# Patient Record
Sex: FEMALE | Race: BLACK OR AFRICAN AMERICAN | Employment: UNEMPLOYED | URBAN - METROPOLITAN AREA
[De-identification: names, ages, dates, MRNs, and addresses within clinical notes are randomized per-mention and may not be internally consistent; named-entity substitution may affect disease eponyms.]

---

## 2017-01-21 ENCOUNTER — OFFICE VISIT (OUTPATIENT)
Dept: INTERNAL MEDICINE CLINIC | Age: 27
End: 2017-01-21

## 2017-01-21 VITALS
SYSTOLIC BLOOD PRESSURE: 165 MMHG | BODY MASS INDEX: 47.66 KG/M2 | RESPIRATION RATE: 18 BRPM | DIASTOLIC BLOOD PRESSURE: 78 MMHG | TEMPERATURE: 97.9 F | WEIGHT: 259 LBS | HEART RATE: 79 BPM | HEIGHT: 62 IN | OXYGEN SATURATION: 96 %

## 2017-01-21 DIAGNOSIS — Z20.2 POSSIBLE EXPOSURE TO STD: Primary | ICD-10-CM

## 2017-01-21 RX ORDER — FAMOTIDINE 20 MG/1
20 TABLET, FILM COATED ORAL
COMMUNITY
Start: 2016-11-03 | End: 2017-01-30 | Stop reason: SDUPTHER

## 2017-01-21 RX ORDER — ONDANSETRON 4 MG/1
4 TABLET, ORALLY DISINTEGRATING ORAL
COMMUNITY
Start: 2014-05-27 | End: 2018-02-08

## 2017-01-21 RX ORDER — CYCLOBENZAPRINE HCL 10 MG
10 TABLET ORAL
COMMUNITY
Start: 2016-07-17 | End: 2017-01-30 | Stop reason: SDUPTHER

## 2017-01-21 RX ORDER — BUPROPION HYDROCHLORIDE 100 MG/1
TABLET ORAL
COMMUNITY
End: 2018-02-08

## 2017-01-21 RX ORDER — CITALOPRAM 40 MG/1
TABLET, FILM COATED ORAL
COMMUNITY
End: 2018-02-08

## 2017-01-21 RX ORDER — EPINEPHRINE 0.3 MG/.3ML
0.3 INJECTION SUBCUTANEOUS
COMMUNITY
Start: 2016-11-03 | End: 2019-08-01 | Stop reason: SDUPTHER

## 2017-01-21 RX ORDER — BENZONATATE 100 MG/1
CAPSULE ORAL
COMMUNITY
Start: 2014-09-14 | End: 2018-02-08

## 2017-01-21 RX ORDER — DIPHENHYDRAMINE HCL 25 MG
25 CAPSULE ORAL
COMMUNITY
Start: 2014-03-21
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
## 2017-01-21 NOTE — PROGRESS NOTES
SUBJECTIVE:   HPI:  Gilma Calderon is a 32 y.o. female who complains of STD exposure. Patient reports unprotected sexual intercourse with someone a month ago. She just found out this week that that person may have HIV. Patient states that she currently has no symptoms to complain of, just worried about possibly having HIV. Denies any fever/chills/night sweats/weight loss/chest pain/nausea/vomiting/diarrhea. No skin lesion. ROS:    · General: negative for chills, fever, weight changes or malaise  · Respiratory: no cough, shortness of breath, or wheezing  · Cardiovascular: no chest pain, palpitations, or dyspnea on exertion  · Gastrointestinal: no GERD or history of PUD, abdominal pain, N/V, change in bowel habits, or black or bloody stools  · Musculoskeletal: no muscle weakness  · Neurological: no numbness, tingling, headache or dizziness    Past Medical History   Diagnosis Date    ASCUS (atypical squamous cells of undetermined significance) on Pap smear 3/11    Asthma     B[facility]     Chlamydia      \"    Chronic back pain     Depression     Elevated blood sugar     GC (gonococcus infection) 2007/ 2004    HPV (human papilloma virus) infection 3/11    Irregular menses     Schizophrenia ([facility]     Sleep apnea 2/14    Smoker     Suicide attempt ([facility] 5/08     with tylenol    Suicide attempt ([facility] 10/09    Trichomonal vaginitis 8/10    Uterine fibroid      History reviewed. No pertinent past surgical history. Outpatient Prescriptions Marked as Taking for the 1/21/17 encounter (Office Visit) with David Berry, DO   Medication Sig Dispense Refill    diphenhydrAMINE (BENADRYL) 25 mg capsule 25 mg.      EPINEPHrine (EPIPEN) 0.3 mg/0.3 mL injection 0.3 mg.      cyclobenzaprine (FLEXERIL) 10 mg tablet 10 mg.      metFORMIN (GLUCOPHAGE) 500 mg tablet Take 1 Tab by mouth two (2) times daily (with meals).  60 Tab 0    HYDROcodone-acetaminophen (NORCO) 5-325 mg per tablet Take 1 Tab by mouth every four (4) hours as needed for Pain.  albuterol (PROVENTIL VENTOLIN) 2.5 mg /3 mL (0.083 %) nebulizer solution 3 mL by Nebulization route three (3) times daily as needed for Wheezing. 100 Each 5    albuterol (PROVENTIL HFA, VENTOLIN HFA, PROAIR HFA) 90 mcg/actuation inhaler Take 2 Puffs by inhalation every six (6) hours as needed for Wheezing. 1 Inhaler 5    fluticasone (FLOVENT HFA) 110 mcg/actuation inhaler Take 1 Puff by inhalation every twelve (12) hours. 1 Inhaler 5     Allergies   Allergen Reactions    Other Food Anaphylaxis     [place], guacamole    Argan Kernal Oil (Arganina Spinosa) Hives    Naproxen Swelling    Percocet [Oxycodone-Acetaminophen] Itching       OBJECTIVE:  Visit Vitals    /78    Pulse 79    Temp 97.9 °F (36.6 °C) (Oral)    Resp 18    Ht 5' 2\" (1.575 m)    Wt 259 lb (117.5 kg)    LMP 01/15/2017 (Exact Date)    SpO2 96%    BMI 47.37 kg/m2      GEN: awake, alert, orientated, in no acute distress  THROAT: clear, no erythema, no exudate  NECK: No lymphadenopathy, no appearing thyroid  CV:  The heart sounds are regular in rate and rhythm. There is a normal S1 and S2. There or no murmurs, rubs, or gallops. Distal pulses are intact and equal. No peripheral edema. Lungs:  Lung sounds are clear and equal to auscultation throughout all lung fields. ASSESSMENT/PLAN:     1. Possible exposure to STD - possible exposure to HIV infection over a month ago. Currently asymptomatic without fever. Will check for HIV as well as other possible STDs. Patient to call or return next week for lab results. - HIV 1/2 AG/AB, 4TH GENERATION,W RFLX CONFIRM; Future  - CHLAMYDIA/NEISSERIA BY [place] W/REFLEX CONFIRM; Future  - RPR; Future  - HEPATITIS C AB;  Future  - HEP B SURFACE AG; Future

## 2017-01-21 NOTE — MR AVS SNAPSHOT
Visit Information Date & Time Provider Department Dept. Phone Encounter #  
 1/21/2017 11:30 AM Alonso Hobbs, [address] [place] [address] Street [id number] Follow-up Instructions Return if symptoms worsen or fail to improve. Your Appointments 1/30/2017 11:30 AM  
PHYSICAL with MD [phone]) Appt Note: and include a hiv test  
 [address]  
  
   
 [address] [facility] Upcoming Health Maintenance Date Due Pneumococcal 19-64 Medium Risk (1 of 1 - PPSV23) 4/22/2009 DTaP/Tdap/Td series (1 - Tdap) 4/22/2011 HPV AGE 9Y-26Y (2 of 3 - Female 3 Dose Series) 11/30/2015 PAP AKA CERVICAL CYTOLOGY 12/2/2016 Allergies as of 1/21/2017  Review Complete On: 1/21/2017 By: Alonso Hobbs DO Severity Noted Reaction Type Reactions Other Food High 11/22/2016    Anaphylaxis Patience Mash Argan Kernal Oil ([place] Wallace)  12/06/2016    Hives Naproxen  12/07/2012   Side Effect Swelling Percocet [Oxycodone-acetaminophen]  12/07/2012   Side Effect Itching Current Immunizations  Reviewed on 8/25/2016 Name Date  
 TB Skin Test (PPD) Intradermal 8/25/2016 Not reviewed this visit You Were Diagnosed With   
  
 Codes Comments Possible exposure to STD    -  Primary ICD-10-CM: Z20.2 ICD-9-CM: V01.6 Vitals BP Pulse Temp Resp Height(growth percentile) Weight(growth percentile) 165/78 79 97.9 °F (36.6 °C) (Oral) 18 5' 2\" (1.575 m) 259 lb (117.5 kg) LMP SpO2 BMI OB Status Smoking Status 01/15/2017 (Exact Date) 96% 47.37 kg/m2 Having regular periods Current Every Day Smoker BMI and BSA Data Body Mass Index Body Surface Area  
 47.37 kg/m 2 2.27 m 2 Preferred Pharmacy Pharmacy Name Phone [facility], [address] [facility] [phone] Your Updated Medication List  
  
   
This list is accurate as of: 1/21/17 12:35 PM.  Always use your most recent med list.  
  
  
  
  
 acetaminophen 325 mg tablet Commonly known as:  TYLENOL Take 2 Tabs by mouth every four (4) hours as needed for Pain. * albuterol 90 mcg/actuation inhaler Commonly known as:  PROVENTIL HFA, VENTOLIN HFA, PROAIR HFA Take 2 Puffs by inhalation every six (6) hours as needed for Wheezing. * albuterol 2.5 mg /3 mL (0.083 %) nebulizer solution Commonly known as:  PROVENTIL VENTOLIN  
3 mL by Nebulization route three (3) times daily as needed for Wheezing. BENADRYL 25 mg capsule Generic drug:  diphenhydrAMINE 25 mg. buPROPion 100 mg tablet Commonly known as:  [facility] Take  by Mouth.  
  
 citalopram 40 mg tablet Commonly known as:  Cheryal Coke Take  by Mouth. * cyclobenzaprine 10 mg tablet Commonly known as:  FLEXERIL  
10 mg.  
  
 * cyclobenzaprine 5 mg tablet Commonly known as:  FLEXERIL Take 1 Tab by mouth three (3) times daily as needed for Muscle Spasm(s). diclofenac EC 75 mg EC tablet Commonly known as:  VOLTAREN Take 1 Tab by mouth two (2) times a day. EPIPEN 0.3 mg/0.3 mL injection Generic drug:  EPINEPHrine  
0.3 mg.  
  
 * fluticasone 110 mcg/actuation inhaler Commonly known as:  FLOVENT HFA Take 1 Puff by inhalation every twelve (12) hours. * fluticasone 50 mcg/actuation nasal spray Commonly known as:  [place] Daggett 2 Sprays by Both Nostrils route daily. metFORMIN 500 mg tablet Commonly known as:  GLUCOPHAGE Take 1 Tab by mouth two (2) times daily (with meals). montelukast 10 mg tablet Commonly known as:  SINGULAIR Take 1 Tab by mouth daily. Indications: ALLERGIC RHINITIS, ASTHMA PREVENTION  
  
 NORCO 5-325 mg per tablet Generic drug:  HYDROcodone-acetaminophen Take 1 Tab by mouth every four (4) hours as needed for Pain. ondansetron 4 mg disintegrating tablet Commonly known as:  ZOFRAN ODT  
4 mg. PEPCID 20 mg tablet Generic drug:  famotidine 20 mg.  
  
 TESSALON PERLES 100 mg capsule Generic drug:  benzonatate Take 1 capsule 3 times daily as needed for coughing, swallow capsules whole. * Notice: This list has 6 medication(s) that are the same as other medications prescribed for you. Read the directions carefully, and ask your doctor or other care provider to review them with you. Follow-up Instructions Return if symptoms worsen or fail to improve. To-Do List   
 01/21/2017 Lab:  CHLAMYDIA/NEISSERIA BY [place] W/REFLEX CONFIRM   
  
 01/21/2017 Lab:  HEP B SURFACE AG   
  
 01/21/2017 Lab:  HEPATITIS C AB   
  
 01/21/2017 Lab:  HIV 1/2 AG/AB, 4TH GENERATION,W RFLX CONFIRM   
  
 01/21/2017 Lab:  [facility] SERVICES! Dear Delia Evans: 
Thank you for requesting a [email or web address] account. Our records indicate that you already have an active [email or web address] account. You can access your account anytime at [email or web address] Did you know that you can access your hospital and ER discharge instructions at any time in [email or web address]? You can also review all of your test results from your hospital stay or ER visit. Additional Information If you have questions, please visit the Frequently Asked Questions section of the [email or web address] website at [email or web address] Remember, [email or web address] is NOT to be used for urgent needs. For medical emergencies, dial 911. Now available from your iPhone and Android! Please provide this summary of care documentation to your next provider. Your primary care clinician is listed as [facility]. If you have any questions after today's visit, please call [phone].

## 2017-01-25 LAB
C TRACH RRNA SPEC QL NAA+PROBE: NEGATIVE
HBV SURFACE AG SERPL QL IA: NEGATIVE
HCV AB S/CO SERPL IA: <0.1 S/CO RATIO (ref 0–0.9)
HIV 1+2 AB+HIV1 P24 AG SERPL QL IA: NON REACTIVE
N GONORRHOEA RRNA SPEC QL NAA+PROBE: NEGATIVE
RPR SER QL: NON REACTIVE

## 2017-01-30 ENCOUNTER — APPOINTMENT (OUTPATIENT)
Dept: PHYSICAL THERAPY | Age: 27
End: 2017-01-30

## 2017-01-30 ENCOUNTER — OFFICE VISIT (OUTPATIENT)
Dept: INTERNAL MEDICINE CLINIC | Age: 27
End: 2017-01-30

## 2017-01-30 VITALS
WEIGHT: 258.8 LBS | OXYGEN SATURATION: 98 % | SYSTOLIC BLOOD PRESSURE: 131 MMHG | BODY MASS INDEX: 47.62 KG/M2 | HEIGHT: 62 IN | RESPIRATION RATE: 18 BRPM | HEART RATE: 82 BPM | TEMPERATURE: 97.9 F | DIASTOLIC BLOOD PRESSURE: 90 MMHG

## 2017-01-30 DIAGNOSIS — Z76.0 MEDICATION REFILL: ICD-10-CM

## 2017-01-30 DIAGNOSIS — M54.50 CHRONIC LOW BACK PAIN WITHOUT SCIATICA, UNSPECIFIED BACK PAIN LATERALITY: Primary | Chronic | ICD-10-CM

## 2017-01-30 DIAGNOSIS — Z51.81 MEDICATION MONITORING ENCOUNTER: ICD-10-CM

## 2017-01-30 DIAGNOSIS — G89.29 CHRONIC LOW BACK PAIN WITHOUT SCIATICA, UNSPECIFIED BACK PAIN LATERALITY: Primary | Chronic | ICD-10-CM

## 2017-01-30 LAB
AMPHETAMINE QL URINE POC: NEGATIVE
COCAINE QL URINE POC: NEGATIVE
LOT EXP DATE POC: NORMAL
LOT NUMBER POC: NORMAL
MARIJUANA (THC) QL URINE POC: NEGATIVE
METHAMPHETAMINE QL URINE POC: NEGATIVE
OPIATES QL URINE POC: NEGATIVE
PHENCYCLIDINE QL URINE POC: NORMAL
VALID INTERNAL CONTROL?: YES

## 2017-01-30 RX ORDER — HYDROCODONE BITARTRATE AND ACETAMINOPHEN 5; 325 MG/1; MG/1
1 TABLET ORAL
Qty: 120 TAB | Refills: 0 | Status: SHIPPED | OUTPATIENT
Start: 2017-01-30 | End: 2017-04-28 | Stop reason: SDUPTHER

## 2017-01-30 RX ORDER — FAMOTIDINE 20 MG/1
20 TABLET, FILM COATED ORAL DAILY
Qty: 90 TAB | Refills: 5 | Status: SHIPPED | OUTPATIENT
Start: 2017-01-30 | End: 2021-11-16 | Stop reason: SDUPTHER

## 2017-01-30 NOTE — PROGRESS NOTES
HISTORY OF PRESENT ILLNESS  Daniel Kirk is a 32 y.o. female. Visit Vitals    /90    Pulse 82    Temp 97.9 °F (36.6 °C) (Oral)    Resp 18    Ht 5' 2\" (1.575 m)    Wt 258 lb 12.8 oz (117.4 kg)    LMP 01/15/2017 (Exact Date)    SpO2 98%    BMI 47.34 kg/m2       HPI Comments: Also discussed the STD testing that she had--results given to her    Medication Refill   The history is provided by the patient (pt here for refill on her pain medicaion for her chronic and longstanding back pain). This is a new problem. Back Pain    The history is provided by the patient. This is a chronic problem. The current episode started more than 1 week ago. The problem has not changed since onset. The problem occurs daily. The pain is associated with a remote injury. The pain is present in the lumbar spine. The quality of the pain is described as aching and similar to previous episodes. The pain does not radiate. The symptoms are aggravated by bending and certain positions. She has tried NSAIDs, ice, analgesics and muscle relaxants (opioids) for the symptoms. Risk factors include obesity, lack of exercise and a sedentary lifestyle. Review of Systems   Constitutional: Negative. Musculoskeletal: Positive for back pain. Physical Exam   Constitutional: She is oriented to person, place, and time. She appears well-developed and well-nourished. No distress. Cardiovascular: Normal rate. Pulmonary/Chest: Effort normal.   Musculoskeletal: She exhibits no edema. Fair back flexion. NML strength observed   Neurological: She is alert and oriented to person, place, and time. Skin: Skin is warm and dry. She is not diaphoretic. Nursing note and vitals reviewed. ASSESSMENT and PLAN    ICD-10-CM ICD-9-CM    1. Chronic low back pain without sciatica, unspecified back pain laterality M54.5 724.2     G89.29 338.29    2.  Medication refill Z76.0 V68.1 HYDROcodone-acetaminophen (NORCO) 5-325 mg per tablet famotidine (PEPCID) 20 mg tablet      HYDROcodone-acetaminophen (NORCO) 5-325 mg per tablet      HYDROcodone-acetaminophen (NORCO) 5-325 mg per tablet   3.  Medication monitoring encounter Z51.81 V58.83 AMB POC DRUG SCREEN (LIST A ANY # NON TLC DEVICES)       Pt needs to return in 3 months for WWE at the time of next refill visit      POC UDS negative (does not test for hydrocodone)

## 2017-01-30 NOTE — MR AVS SNAPSHOT
Visit Information Date & Time Provider Department Dept. Phone Encounter #  
 1/30/2017 11:30 AM Marlon Prader, [address] [facility] [facility] [address] [facility] [address] [id number] Follow-up Instructions Return in about 3 months (around 4/30/2017) for Well Woman Exam, chronic pain, Med refills. Upcoming Health Maintenance Date Due  
 HPV AGE 9Y-34Y (2 of 3 - Female 3 Dose Series) 11/30/2015 PAP AKA CERVICAL CYTOLOGY 12/2/2016 DTaP/Tdap/Td series (2 - Td) 1/30/2027 Allergies as of 1/30/2017  Review Complete On: 1/30/2017 By: Marlon Prader, MD  
  
 Severity Noted Reaction Type Reactions Other Food High 11/22/2016    Anaphylaxis Rober Gentle Argan Kernal Oil (Chavo Crafts)  12/06/2016    Hives Naproxen  12/07/2012   Side Effect Swelling Percocet [Oxycodone-acetaminophen]  12/07/2012   Side Effect Itching Current Immunizations  Reviewed on 8/25/2016 Name Date  
 TB Skin Test (PPD) Intradermal 8/25/2016 Not reviewed this visit You Were Diagnosed With   
  
 Codes Comments Chronic low back pain without sciatica, unspecified back pain laterality    -  Primary ICD-10-CM: M54.5, G89.29 ICD-9-CM: 724.2, 338.29 Medication refill     ICD-10-CM: Z76.0 ICD-9-CM: V68.1 Medication monitoring encounter     ICD-10-CM: Z51.81 
ICD-9-CM: V58.83 Vitals BP Pulse Temp Resp Height(growth percentile) Weight(growth percentile) 131/90 82 97.9 °F (36.6 °C) (Oral) 18 5' 2\" (1.575 m) 258 lb 12.8 oz (117.4 kg) LMP SpO2 BMI OB Status Smoking Status 01/15/2017 (Exact Date) 98% 47.34 kg/m2 Having regular periods Current Every Day Smoker Vitals History BMI and BSA Data Body Mass Index Body Surface Area  
 47.34 kg/m 2 2.27 m 2 Preferred Pharmacy Pharmacy Name Phone [facility] [place], [address] [phone] Your Updated Medication List  
  
   
 This list is accurate as of: 1/30/17 12:41 PM.  Always use your most recent med list.  
  
  
  
  
 acetaminophen 325 mg tablet Commonly known as:  TYLENOL Take 2 Tabs by mouth every four (4) hours as needed for Pain. * albuterol 90 mcg/actuation inhaler Commonly known as:  PROVENTIL HFA, VENTOLIN HFA, PROAIR HFA Take 2 Puffs by inhalation every six (6) hours as needed for Wheezing. * albuterol 2.5 mg /3 mL (0.083 %) nebulizer solution Commonly known as:  PROVENTIL VENTOLIN  
3 mL by Nebulization route three (3) times daily as needed for Wheezing. BENADRYL 25 mg capsule Generic drug:  diphenhydrAMINE 25 mg. buPROPion 100 mg tablet Commonly known as:  [facility] Take  by Mouth.  
  
 citalopram 40 mg tablet Commonly known as:  Yohana Mutters Take  by Mouth. cyclobenzaprine 5 mg tablet Commonly known as:  FLEXERIL Take 1 Tab by mouth three (3) times daily as needed for Muscle Spasm(s). diclofenac EC 75 mg EC tablet Commonly known as:  VOLTAREN Take 1 Tab by mouth two (2) times a day. EPIPEN 0.3 mg/0.3 mL injection Generic drug:  EPINEPHrine  
0.3 mg.  
  
 famotidine 20 mg tablet Commonly known as:  PEPCID Take 1 Tab by mouth daily. * fluticasone 110 mcg/actuation inhaler Commonly known as:  FLOVENT HFA Take 1 Puff by inhalation every twelve (12) hours. * fluticasone 50 mcg/actuation nasal spray Commonly known as:  Osei Riser 2 Sprays by Both Nostrils route daily. * HYDROcodone-acetaminophen 5-325 mg per tablet Commonly known as:  [employer] President Take 1 Tab by mouth every six (6) hours as needed for Pain. Max Daily Amount: 4 Tabs. * HYDROcodone-acetaminophen 5-325 mg per tablet Commonly known as:  Bev President Take 1 Tab by mouth every six (6) hours as needed for Pain. * HYDROcodone-acetaminophen 5-325 mg per tablet Commonly known as:  Bev President Take 1 Tab by mouth every six (6) hours as needed for Pain. metFORMIN 500 mg tablet Commonly known as:  GLUCOPHAGE Take 1 Tab by mouth two (2) times daily (with meals). montelukast 10 mg tablet Commonly known as:  SINGULAIR Take 1 Tab by mouth daily. Indications: ALLERGIC RHINITIS, ASTHMA PREVENTION  
  
 ondansetron 4 mg disintegrating tablet Commonly known as:  ZOFRAN ODT  
4 mg. TESSALON PERLES 100 mg capsule Generic drug:  benzonatate Take 1 capsule 3 times daily as needed for coughing, swallow capsules whole. * Notice: This list has 7 medication(s) that are the same as other medications prescribed for you. Read the directions carefully, and ask your doctor or other care provider to review them with you. Prescriptions Printed Refills HYDROcodone-acetaminophen (NORCO) 5-325 mg per tablet 0 Sig: Take 1 Tab by mouth every six (6) hours as needed for Pain. Max Daily Amount: 4 Tabs. Class: Print Route: Oral  
 HYDROcodone-acetaminophen (NORCO) 5-325 mg per tablet 0 Sig: Take 1 Tab by mouth every six (6) hours as needed for Pain. Class: Print Route: Oral  
 HYDROcodone-acetaminophen (NORCO) 5-325 mg per tablet 0 Sig: Take 1 Tab by mouth every six (6) hours as needed for Pain. Class: Print Route: Oral  
  
Prescriptions Sent to Pharmacy Refills  
 famotidine (PEPCID) 20 mg tablet 5 Sig: Take 1 Tab by mouth daily. Class: Normal  
 Pharmacy: [address], [address] Ph #: [phone] Route: Oral  
  
We Performed the Following AMB POC DRUG SCREEN (LIST A ANY # NON TLC DEVICES) [id number] CPT(R)] Follow-up Instructions Return in about 3 months (around 4/30/2017) for Well Woman Exam, chronic pain, Med refills. To-Do List   
 02/14/2017 9:00 AM  
  Appointment with Mansi Guzmán RD at [address] [facility] SERVICES! Dear Judith Cordero: 
Thank you for requesting a [employer] account.   Our records indicate that you already have an active [employer] account. You can access your account anytime at [email or web address] Did you know that you can access your hospital and ER discharge instructions at any time in [employer]? You can also review all of your test results from your hospital stay or ER visit. Additional Information If you have questions, please visit the Frequently Asked Questions section of the [employer] website at [email or web address]/. Remember, [employer] is NOT to be used for urgent needs. For medical emergencies, dial 911. Now available from your iPhone and Android! Please provide this summary of care documentation to your next provider. Your primary care clinician is listed as [facility]. If you have any questions after today's visit, please call [phone].

## 2017-01-30 NOTE — PROGRESS NOTES
ROOM # 4    Pt presents today for med refills and to discuss lab results    Pt preferred language for health care discussion is english. Is someone accompanying this pt? no    Is the patient using any DME equipment during OV? no    Depression Screening completed. Active Dx    Learning Assessment completed. Yes    Abuse Screening completed. Yes    Health Maintenance reviewed and discussed per provider. Yes    Pt is due for Pap. Please order/place referral if appropriate. Advance Directive:  1. Do you have an advance directive in place? Patient Reply: No    2. If not, would you like material regarding how to put one in place? Patient Reply: No    Coordination of Care:  1. Have you been to the ER, urgent care clinic since your last visit? Hospitalized since your last visit? No    2. Have you seen or consulted any other health care providers outside of the [address] [place] [address] since your last visit? Include any pap smears or colon screening.  No

## 2017-04-28 ENCOUNTER — OFFICE VISIT (OUTPATIENT)
Dept: INTERNAL MEDICINE CLINIC | Age: 27
End: 2017-04-28

## 2017-04-28 VITALS
SYSTOLIC BLOOD PRESSURE: 123 MMHG | HEART RATE: 83 BPM | DIASTOLIC BLOOD PRESSURE: 76 MMHG | HEIGHT: 62 IN | RESPIRATION RATE: 18 BRPM | BODY MASS INDEX: 47.29 KG/M2 | WEIGHT: 257 LBS | TEMPERATURE: 97.7 F | OXYGEN SATURATION: 97 %

## 2017-04-28 DIAGNOSIS — M54.50 CHRONIC LOW BACK PAIN WITHOUT SCIATICA, UNSPECIFIED BACK PAIN LATERALITY: Primary | Chronic | ICD-10-CM

## 2017-04-28 DIAGNOSIS — G89.29 CHRONIC LOW BACK PAIN WITHOUT SCIATICA, UNSPECIFIED BACK PAIN LATERALITY: Primary | Chronic | ICD-10-CM

## 2017-04-28 DIAGNOSIS — Z76.0 MEDICATION REFILL: ICD-10-CM

## 2017-04-28 DIAGNOSIS — G47.33 OBSTRUCTIVE SLEEP APNEA SYNDROME: ICD-10-CM

## 2017-04-28 RX ORDER — HYDROCODONE BITARTRATE AND ACETAMINOPHEN 5; 325 MG/1; MG/1
1 TABLET ORAL
Qty: 120 TAB | Refills: 0 | Status: SHIPPED | OUTPATIENT
Start: 2017-04-28 | End: 2017-06-29 | Stop reason: SDUPTHER

## 2017-04-28 RX ORDER — FLUTICASONE PROPIONATE 50 MCG
2 SPRAY, SUSPENSION (ML) NASAL DAILY
Qty: 1 BOTTLE | Refills: 5 | Status: SHIPPED | OUTPATIENT
Start: 2017-04-28 | End: 2018-07-27

## 2017-04-28 RX ORDER — METFORMIN HYDROCHLORIDE 500 MG/1
500 TABLET ORAL 2 TIMES DAILY WITH MEALS
Qty: 60 TAB | Refills: 5 | Status: SHIPPED | OUTPATIENT
Start: 2017-04-28 | End: 2018-05-09 | Stop reason: SDUPTHER

## 2017-04-28 NOTE — MR AVS SNAPSHOT
Visit Information Date & Time Provider Department Dept. Phone Encounter #  
 4/28/2017  9:00 AM Winnie Bermudez, [address] [id number] Follow-up Instructions Return in about 3 months (around 7/28/2017) for Well Woman Exam, chronic pain, Med refills. Upcoming Health Maintenance Date Due  
 PAP AKA CERVICAL CYTOLOGY 12/2/2016 DTaP/Tdap/Td series (2 - Td) 1/30/2027 Allergies as of 4/28/2017  Review Complete On: 4/28/2017 By: Winnie Bermudez MD  
  
 Severity Noted Reaction Type Reactions Other Food High 11/22/2016    Anaphylaxis Uche Pearl Argan Kernal Oil (Artelia Bulls)  12/06/2016    Hives Naproxen  12/07/2012   Side Effect Swelling Percocet [Oxycodone-acetaminophen]  12/07/2012   Side Effect Itching Current Immunizations  Reviewed on 8/25/2016 Name Date  
 TB Skin Test (PPD) Intradermal 8/25/2016 Not reviewed this visit You Were Diagnosed With   
  
 Codes Comments Chronic low back pain without sciatica, unspecified back pain laterality    -  Primary ICD-10-CM: M54.5, G89.29 ICD-9-CM: 724.2, 338.29 Obstructive sleep apnea syndrome     ICD-10-CM: G47.33 
ICD-9-CM: 327.23 Medication refill     ICD-10-CM: Z76.0 ICD-9-CM: V68.1 Vitals BP Pulse Temp Resp Height(growth percentile) Weight(growth percentile) 123/76 83 97.7 °F (36.5 °C) (Oral) 18 5' 2\" (1.575 m) 257 lb (116.6 kg) LMP SpO2 BMI OB Status Smoking Status 03/01/2017 (Approximate) 97% 47.01 kg/m2 Having regular periods Current Every Day Smoker BMI and BSA Data Body Mass Index Body Surface Area 47.01 kg/m 2 2.26 m 2 Preferred Pharmacy Pharmacy Name Phone [id number] - [place], [address] [facility]. [phone] Your Updated Medication List  
  
   
This list is accurate as of: 4/28/17  9:58 AM.  Always use your most recent med list.  
  
  
 acetaminophen 325 mg tablet Commonly known as:  TYLENOL Take 2 Tabs by mouth every four (4) hours as needed for Pain. * albuterol 90 mcg/actuation inhaler Commonly known as:  PROVENTIL HFA, VENTOLIN HFA, PROAIR HFA Take 2 Puffs by inhalation every six (6) hours as needed for Wheezing. * albuterol 2.5 mg /3 mL (0.083 %) nebulizer solution Commonly known as:  PROVENTIL VENTOLIN  
3 mL by Nebulization route three (3) times daily as needed for Wheezing. BENADRYL 25 mg capsule Generic drug:  diphenhydrAMINE 25 mg. buPROPion 100 mg tablet Commonly known as:  [facility] Take  by Mouth.  
  
 citalopram 40 mg tablet Commonly known as:  Randa Brunette Take  by Mouth. cyclobenzaprine 5 mg tablet Commonly known as:  FLEXERIL Take 1 Tab by mouth three (3) times daily as needed for Muscle Spasm(s). diclofenac EC 75 mg EC tablet Commonly known as:  VOLTAREN Take 1 Tab by mouth two (2) times a day. EPIPEN 0.3 mg/0.3 mL injection Generic drug:  EPINEPHrine  
0.3 mg.  
  
 famotidine 20 mg tablet Commonly known as:  PEPCID Take 1 Tab by mouth daily. * fluticasone 110 mcg/actuation inhaler Commonly known as:  FLOVENT HFA Take 1 Puff by inhalation every twelve (12) hours. * fluticasone 50 mcg/actuation nasal spray Commonly known as:  Pietro South 2 Sprays by Both Nostrils route daily. * HYDROcodone-acetaminophen 5-325 mg per tablet Commonly known as:  Maryfrances Grumbles Take 1 Tab by mouth every six (6) hours as needed for Pain. Max Daily Amount: 4 Tabs. * HYDROcodone-acetaminophen 5-325 mg per tablet Commonly known as:  Maryfrances Grumbles Take 1 Tab by mouth every six (6) hours as needed for Pain. * HYDROcodone-acetaminophen 5-325 mg per tablet Commonly known as:  Maryfrances Grumbles Take 1 Tab by mouth every six (6) hours as needed for Pain.  
  
 metFORMIN 500 mg tablet Commonly known as:  GLUCOPHAGE  
 Take 1 Tab by mouth two (2) times daily (with meals). montelukast 10 mg tablet Commonly known as:  SINGULAIR Take 1 Tab by mouth daily. Indications: ALLERGIC RHINITIS, ASTHMA PREVENTION  
  
 ondansetron 4 mg disintegrating tablet Commonly known as:  ZOFRAN ODT  
4 mg. TESSALON PERLES 100 mg capsule Generic drug:  benzonatate Take 1 capsule 3 times daily as needed for coughing, swallow capsules whole. * Notice: This list has 7 medication(s) that are the same as other medications prescribed for you. Read the directions carefully, and ask your doctor or other care provider to review them with you. Prescriptions Printed Refills HYDROcodone-acetaminophen (NORCO) 5-325 mg per tablet 0 Sig: Take 1 Tab by mouth every six (6) hours as needed for Pain. Max Daily Amount: 4 Tabs. Class: Print Route: Oral  
 HYDROcodone-acetaminophen (NORCO) 5-325 mg per tablet 0 Sig: Take 1 Tab by mouth every six (6) hours as needed for Pain. Class: Print Route: Oral  
 HYDROcodone-acetaminophen (NORCO) 5-325 mg per tablet 0 Sig: Take 1 Tab by mouth every six (6) hours as needed for Pain. Class: Print Route: Oral  
  
Prescriptions Sent to Pharmacy Refills  
 metFORMIN (GLUCOPHAGE) 500 mg tablet 5 Sig: Take 1 Tab by mouth two (2) times daily (with meals). Class: Normal  
 Pharmacy: [facility], [address] [facility]. Ph #: [phone] Route: Oral  
 fluticasone (FLONASE) 50 mcg/actuation nasal spray 5 Si Sprays by Both Nostrils route daily. Class: Normal  
 Pharmacy: [facility], [address] [facility]. Ph #: [phone] Route: Both Nostrils We Performed the Following AMB SUPPLY ORDER [[id number] Custom] Comments:  
 C-PAP supplies Follow-up Instructions Return in about 3 months (around 2017) for Well Woman Exam, chronic pain, Med refills. Introducing [facility] & HE[facility]! Dear Noy Graham: 
Thank you for requesting a [employer] account. Our records indicate that you already have an active [employer] account. You can access your account anytime at [email or web address] Did you know that you can access your hospital and ER discharge instructions at any time in [employer]? You can also review all of your test results from your hospital stay or ER visit. Additional Information If you have questions, please visit the Frequently Asked Questions section of the [employer] website at [email or web address] Remember, [employer] is NOT to be used for urgent needs. For medical emergencies, dial 911. Now available from your iPhone and Android! Please provide this summary of care documentation to your next provider. Your primary care clinician is listed as [facility]. If you have any questions after today's visit, please call [phone].

## 2017-04-28 NOTE — PROGRESS NOTES
HISTORY OF PRESENT ILLNESS  Simeon Downing is a 32 y.o. female. Visit Vitals    /76    Pulse 83    Temp 97.7 °F (36.5 °C) (Oral)    Resp 18    Ht 5' 2\" (1.575 m)    Wt 257 lb (116.6 kg)    LMP 03/01/2017 (Approximate)    SpO2 97%    BMI 47.01 kg/m2       HPI Comments: Had normal POC drug screen Jan 30, 2017    Pt has chronic low back problems. Eventually was place on norco and has been maintained on this w/o consequence. Medication Refill   The history is provided by the patient. This is a new problem. Pertinent negatives include no chest pain, no headaches and no shortness of breath. Review of Systems   Constitutional: Negative. Respiratory: Negative for cough and shortness of breath. Cardiovascular: Negative for chest pain and palpitations. Musculoskeletal: Positive for back pain and joint pain. Neurological: Negative. Negative for headaches. Physical Exam   Constitutional: She is oriented to person, place, and time. She appears well-developed and well-nourished. No distress. Cardiovascular: Normal rate. Pulmonary/Chest: Effort normal.   Genitourinary: Vaginal discharge: neg SLRs. Musculoskeletal: She exhibits no edema. nml leg strength     Neurological: She is alert and oriented to person, place, and time. Skin: Skin is warm and dry. She is not diaphoretic. Psychiatric: She has a normal mood and affect. Nursing note and vitals reviewed. ASSESSMENT and PLAN    ICD-10-CM ICD-9-CM    1. Chronic low back pain without sciatica, unspecified back pain laterality M54.5 724.2     G89.29 338.29    2. Obstructive sleep apnea syndrome G47.33 327.23 AMB SUPPLY ORDER   3.  Medication refill Z76.0 V68.1 metFORMIN (GLUCOPHAGE) 500 mg tablet      HYDROcodone-acetaminophen (NORCO) 5-325 mg per tablet      HYDROcodone-acetaminophen (NORCO) 5-325 mg per tablet      HYDROcodone-acetaminophen (NORCO) 5-325 mg per tablet      fluticasone (FLONASE) 50 mcg/actuation nasal spray       Refill pain meds today  Reviewed new state rules and regulations with her re opioids  Pt has long standing chronic back pain. Has been stable on current meds.  looks very good. UDS January was fine    Discussed weight, lifestyle, diet and exercise. Advised weight also affects her back.     Return 3 mos for WWE and med refills

## 2017-04-28 NOTE — PROGRESS NOTES
ROOM # 4    Boris Mcleod presents today for   Chief Complaint   Patient presents with    Medication Refill       Boris Mcleod preferred language for health care discussion is english/other. Is someone accompanying this pt? no    Is the patient using any DME equipment during OV? no    Depression Screening:  PHQ 2 / 9, over the last two weeks 8/25/2016 6/13/2016 10/5/2015 10/16/2014   Little interest or pleasure in doing things Not at all Not at all Not at all Not at all   Feeling down, depressed or hopeless Not at all Not at all Not at all Not at all   Total Score PHQ 2 0 0 0 0       Learning Assessment:  Learning Assessment 4/27/2015   PRIMARY LEARNER Patient   HIGHEST LEVEL OF EDUCATION - PRIMARY LEARNER  DID NOT GRADUATE [facility] PRIMARY LEARNER NONE   CO-LEARNER CAREGIVER No   PRIMARY LANGUAGE ENGLISH   LEARNER PREFERENCE PRIMARY READING   ANSWERED BY patient   RELATIONSHIP SELF       Abuse Screening:  Abuse Screening Questionnaire 4/27/2015   Do you ever feel afraid of your partner? N   Are you in a relationship with someone who physically or mentally threatens you? N   Is it safe for you to go home? Y       Fall Risk  No flowsheet data found. Health Maintenance reviewed and discussed per provider. Yes    Brois Mcleod is due for pap. Please order/place referral if appropriate. Advance Directive:  1. Do you have an advance directive in place? Patient Reply: no    2. If not, would you like material regarding how to put one in place? Patient Reply: no    Coordination of Care:  1. Have you been to the ER, urgent care clinic since your last visit? Hospitalized since your last visit? no    2. Have you seen or consulted any other health care providers outside of the [address] [place] [address] since your last visit? Include any pap smears or colon screening.  no

## 2017-06-29 ENCOUNTER — OFFICE VISIT (OUTPATIENT)
Dept: INTERNAL MEDICINE CLINIC | Age: 27
End: 2017-06-29

## 2017-06-29 VITALS
WEIGHT: 251 LBS | BODY MASS INDEX: 46.19 KG/M2 | SYSTOLIC BLOOD PRESSURE: 132 MMHG | TEMPERATURE: 98 F | OXYGEN SATURATION: 99 % | HEIGHT: 62 IN | DIASTOLIC BLOOD PRESSURE: 83 MMHG | HEART RATE: 71 BPM | RESPIRATION RATE: 18 BRPM

## 2017-06-29 DIAGNOSIS — Z79.891 LONG TERM CURRENT USE OF OPIATE ANALGESIC: ICD-10-CM

## 2017-06-29 DIAGNOSIS — E11.9 CONTROLLED TYPE 2 DIABETES MELLITUS WITHOUT COMPLICATION, WITHOUT LONG-TERM CURRENT USE OF INSULIN (HCC): Chronic | ICD-10-CM

## 2017-06-29 DIAGNOSIS — G89.29 CHRONIC LOW BACK PAIN WITHOUT SCIATICA, UNSPECIFIED BACK PAIN LATERALITY: Primary | Chronic | ICD-10-CM

## 2017-06-29 DIAGNOSIS — E66.8 MODERATE OBESITY: ICD-10-CM

## 2017-06-29 DIAGNOSIS — Z76.0 MEDICATION REFILL: ICD-10-CM

## 2017-06-29 DIAGNOSIS — M54.50 CHRONIC LOW BACK PAIN WITHOUT SCIATICA, UNSPECIFIED BACK PAIN LATERALITY: Primary | Chronic | ICD-10-CM

## 2017-06-29 LAB
ALCOHOL UR POC: NORMAL
AMPHETAMINES UR POC: NEGATIVE
BARBITURATES UR POC: NEGATIVE
BENZODIAZEPINES UR POC: NEGATIVE
BUPRENORPHINE UR POC: NORMAL
CANNABINOIDS UR POC: NEGATIVE
CARISOPRODOL UR POC: NORMAL
COCAINE UR POC: NEGATIVE
FENTANYL UR POC: NORMAL
MDMA/ECSTASY UR POC: NEGATIVE
METHADONE UR POC: NEGATIVE
METHAMPHETAMINE UR POC: NEGATIVE
METHYLPHENIDATE UR POC: NEGATIVE
OPIATES UR POC: NEGATIVE
OXYCODONE UR POC: NEGATIVE
PHENCYCLIDINE UR POC: NORMAL
PROPOXYPHENE UR POC: NORMAL
TRAMADOL UR POC: NORMAL
TRICYCLICS UR POC: NORMAL

## 2017-06-29 RX ORDER — HYDROCODONE BITARTRATE AND ACETAMINOPHEN 5; 325 MG/1; MG/1
1 TABLET ORAL
Qty: 120 TAB | Refills: 0 | Status: SHIPPED | OUTPATIENT
Start: 2017-06-29 | End: 2017-10-30 | Stop reason: SDUPTHER

## 2017-06-29 RX ORDER — HYDROCODONE BITARTRATE AND ACETAMINOPHEN 5; 325 MG/1; MG/1
1 TABLET ORAL
Qty: 120 TAB | Refills: 0 | Status: SHIPPED | OUTPATIENT
Start: 2017-06-29 | End: 2017-10-27 | Stop reason: SDUPTHER

## 2017-06-29 NOTE — PROGRESS NOTES
HISTORY OF PRESENT ILLNESS  Mendoza Orellana is a 32 y.o. female. Visit Vitals    /83    Pulse 71    Temp 98 °F (36.7 °C) (Oral)    Resp 18    Ht 5' 2\" (1.575 m)    Wt 251 lb (113.9 kg)    LMP 06/27/2017    SpO2 99%    BMI 45.91 kg/m2       HPI Comments: Pt has chronic back pain maintained on percocet  Last UDS OK. Due today   reviewed and appropriate activity noted. No adverse activity noted. Just started a new job at [employer]. Works 12-10  Five days a week. Works at [employer]. 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 The back of the machine    Medication Refill   The history is provided by the patient (see comments). This is a new problem. Review of Systems   Constitutional: Negative. Respiratory: Negative. Cardiovascular: Negative. Musculoskeletal: Positive for back pain. No change  But reports more foot \"burnoing\" sensations. Sometimes worse with physical activity         Physical Exam   Constitutional: She is oriented to person, place, and time. She appears well-developed and well-nourished. No distress. Cardiovascular: Normal rate. Pulmonary/Chest: Effort normal.   Musculoskeletal: She exhibits no edema. Neurological: She is alert and oriented to person, place, and time. Skin: Skin is warm and dry. She is not diaphoretic. Psychiatric: She has a normal mood and affect. Nursing note and vitals reviewed. ASSESSMENT and PLAN    ICD-10-CM ICD-9-CM    1. Chronic low back pain without sciatica, unspecified back pain laterality M54.5 724.2 AMB POC DRUG SCREEN ()    G89.29 338.29    2. Controlled type 2 diabetes mellitus without complication, without long-term current use of insulin ([facility]) I07.9 593.20 METABOLIC PANEL, BASIC      HEMOGLOBIN A1C W/O EAG   3. Long term current use of opiate analgesic Z79.891 V58.69 AMB POC DRUG SCREEN ()   4. Moderate obesity E66.8 278.00    5.  Medication refill Z76.0 V68.1 HYDROcodone-acetaminophen (NORCO) 5-325 mg per tablet      HYDROcodone-acetaminophen (NORCO) 5-325 mg per tablet      HYDROcodone-acetaminophen (NORCO) 5-325 mg per tablet       Needs f/u glu testing  Foot sxs may be related to DM    UDS neg   reviewed and appropriate activity noted. No adverse activity noted.     Discussed weight, lifestyle, diet and exercise    F/u 3 months for med refills and WWE

## 2017-06-29 NOTE — PROGRESS NOTES
Chief Complaint   Patient presents with    Medication Refill       Pt preferred language for health care discussion is English. Is someone accompanying this pt? no    Is the patient using any DME equipment during OV? no    Depression Screening:  PHQ over the last two weeks 6/29/2017 1/30/2017 8/25/2016 6/13/2016 10/5/2015 10/16/2014   PHQ Not Done Active Diagnosis of Depression or Bipolar Disorder Active Diagnosis of Depression or Bipolar Disorder - - - -   Little interest or pleasure in doing things Not at all - Not at all Not at all Not at all Not at all   Feeling down, depressed or hopeless More than half the days - Not at all Not at all Not at all Not at all   Total Score PHQ 2 2 - 0 0 0 0       Learning Assessment:  Learning Assessment 4/27/2015   PRIMARY LEARNER Patient   HIGHEST LEVEL OF EDUCATION - PRIMARY LEARNER  DID NOT GRADUATE HIGH SCHOOL   BARRIERS PRIMARY LEARNER NONE   CO-LEARNER CAREGIVER No   PRIMARY LANGUAGE ENGLISH   LEARNER PREFERENCE PRIMARY READING   ANSWERED BY patient   RELATIONSHIP SELF       Abuse Screening:  Abuse Screening Questionnaire 4/27/2015   Do you ever feel afraid of your partner? N   Are you in a relationship with someone who physically or mentally threatens you? N   Is it safe for you to go home? Y         Health Maintenance reviewed and discussed per provider. Yes    Pt is due for Pap. Please order/place referral if appropriate. Advance Directive:  1. Do you have an advance directive in place? Patient Reply:no    2. If not, would you like material regarding how to put one in place? Patient Reply: no    Coordination of Care:  1. Have you been to the ER, urgent care clinic since your last visit? Hospitalized since your last visit? no    2. Have you seen or consulted any other health care providers outside of the [address] [place] [address] since your last visit? Include any pap smears or colon screening.  no

## 2017-06-29 NOTE — MR AVS SNAPSHOT
Visit Information Date & Time Provider Department Dept. Phone Encounter #  
 6/29/2017  8:45 AM Jerilyn Jacome, [address] [facility] [phone] [id number] Follow-up Instructions Return in about 3 months (around 9/29/2017) for Well Woman Exam, chronic pain, Med refills, diabetes mellitus. Upcoming Health Maintenance Date Due  
 PAP AKA CERVICAL CYTOLOGY 12/2/2016 INFLUENZA AGE 9 TO ADULT 8/1/2017 DTaP/Tdap/Td series (2 - Td) 1/30/2027 Allergies as of 6/29/2017  Review Complete On: 6/29/2017 By: Sarah Akins, LPN Severity Noted Reaction Type Reactions Other Food High 11/22/2016    Anaphylaxis Arnetha Pali Argan Kernal Oil (Monet Gudelia)  12/06/2016    Hives Naproxen  12/07/2012   Side Effect Swelling Percocet [Oxycodone-acetaminophen]  12/07/2012   Side Effect Itching Current Immunizations  Reviewed on 8/25/2016 Name Date  
 TB Skin Test (PPD) Intradermal 8/25/2016 Not reviewed this visit You Were Diagnosed With   
  
 Codes Comments Chronic low back pain without sciatica, unspecified back pain laterality    -  Primary ICD-10-CM: M54.5, G89.29 ICD-9-CM: 724.2, 338.29 Controlled type 2 diabetes mellitus without complication, without long-term current use of insulin ([facility]     ICD-10-CM: E11.9 ICD-9-CM: 250.00 Long term current use of opiate analgesic     ICD-10-CM: Y70.077 ICD-9-CM: V58.69 Moderate obesity     ICD-10-CM: J19.5 ICD-9-CM: 278.00 Medication refill     ICD-10-CM: Z76.0 ICD-9-CM: V68.1 Vitals BP Pulse Temp Resp Height(growth percentile) Weight(growth percentile) 132/83 71 98 °F (36.7 °C) (Oral) 18 5' 2\" (1.575 m) 251 lb (113.9 kg) LMP SpO2 BMI OB Status Smoking Status 06/27/2017 99% 45.91 kg/m2 Having regular periods Current Every Day Smoker BMI and BSA Data Body Mass Index Body Surface Area  45.91 kg/m 2 2.23 m 2  
  
  
 Preferred Pharmacy Pharmacy Name Phone [id number] - [place], [address] [facility] Prakash Ackerman. [phone] Your Updated Medication List  
  
   
This list is accurate as of: 6/29/17  9:23 AM.  Always use your most recent med list.  
  
  
  
  
 acetaminophen 325 mg tablet Commonly known as:  TYLENOL Take 2 Tabs by mouth every four (4) hours as needed for Pain. * albuterol 90 mcg/actuation inhaler Commonly known as:  PROVENTIL HFA, VENTOLIN HFA, PROAIR HFA Take 2 Puffs by inhalation every six (6) hours as needed for Wheezing. * albuterol 2.5 mg /3 mL (0.083 %) nebulizer solution Commonly known as:  PROVENTIL VENTOLIN  
3 mL by Nebulization route three (3) times daily as needed for Wheezing. BENADRYL 25 mg capsule Generic drug:  diphenhydrAMINE 25 mg. buPROPion 100 mg tablet Commonly known as:  [facility] Take  by Mouth.  
  
 citalopram 40 mg tablet Commonly known as:  Fritz Aura Take  by Mouth. cyclobenzaprine 5 mg tablet Commonly known as:  FLEXERIL Take 1 Tab by mouth three (3) times daily as needed for Muscle Spasm(s). diclofenac EC 75 mg EC tablet Commonly known as:  VOLTAREN Take 1 Tab by mouth two (2) times a day. EPIPEN 0.3 mg/0.3 mL injection Generic drug:  EPINEPHrine  
0.3 mg.  
  
 famotidine 20 mg tablet Commonly known as:  PEPCID Take 1 Tab by mouth daily. * fluticasone 110 mcg/actuation inhaler Commonly known as:  FLOVENT HFA Take 1 Puff by inhalation every twelve (12) hours. * fluticasone 50 mcg/actuation nasal spray Commonly known as:  Tereza Bough 2 Sprays by Both Nostrils route daily. * HYDROcodone-acetaminophen 5-325 mg per tablet Commonly known as:  Davis Fryer Take 1 Tab by mouth every six (6) hours as needed for Pain. Max Daily Amount: 4 Tabs. * HYDROcodone-acetaminophen 5-325 mg per tablet Commonly known as:  Davis Fryer  
 Take 1 Tab by mouth every six (6) hours as needed for Pain. * HYDROcodone-acetaminophen 5-325 mg per tablet Commonly known as:  Elysia Rook Take 1 Tab by mouth every six (6) hours as needed for Pain.  
  
 metFORMIN 500 mg tablet Commonly known as:  GLUCOPHAGE Take 1 Tab by mouth two (2) times daily (with meals). montelukast 10 mg tablet Commonly known as:  SINGULAIR Take 1 Tab by mouth daily. Indications: ALLERGIC RHINITIS, ASTHMA PREVENTION  
  
 ondansetron 4 mg disintegrating tablet Commonly known as:  ZOFRAN ODT  
4 mg. TESSALON PERLES 100 mg capsule Generic drug:  benzonatate Take 1 capsule 3 times daily as needed for coughing, swallow capsules whole. * Notice: This list has 7 medication(s) that are the same as other medications prescribed for you. Read the directions carefully, and ask your doctor or other care provider to review them with you. Prescriptions Printed Refills HYDROcodone-acetaminophen (NORCO) 5-325 mg per tablet 0 Sig: Take 1 Tab by mouth every six (6) hours as needed for Pain. Max Daily Amount: 4 Tabs. Class: Print Route: Oral  
 HYDROcodone-acetaminophen (NORCO) 5-325 mg per tablet 0 Sig: Take 1 Tab by mouth every six (6) hours as needed for Pain. Class: Print Route: Oral  
 HYDROcodone-acetaminophen (NORCO) 5-325 mg per tablet 0 Sig: Take 1 Tab by mouth every six (6) hours as needed for Pain. Class: Print Route: Oral  
  
We Performed the Following AMB POC DRUG SCREEN () [ HCP] Follow-up Instructions Return in about 3 months (around 9/29/2017) for Well Woman Exam, chronic pain, Med refills, diabetes mellitus. To-Do List   
 06/29/2017 Lab:  HEMOGLOBIN A1C W/O EAG   
  
 06/29/2017 Lab:  METABOLIC PANEL, BASIC Introducing [facility] & HEALTH SERVICES! Dear Becca Jamison: 
Thank you for requesting a [employer] account.   Our records indicate that you already have an active [employer] account. You can access your account anytime at [email or web address] Did you know that you can access your hospital and ER discharge instructions at any time in [employer]? You can also review all of your test results from your hospital stay or ER visit. Additional Information If you have questions, please visit the Frequently Asked Questions section of the [employer] website at [email or web address] Remember, [employer] is NOT to be used for urgent needs. For medical emergencies, dial 911. Now available from your iPhone and Android! Please provide this summary of care documentation to your next provider. Your primary care clinician is listed as [facility]. If you have any questions after today's visit, please call [phone].

## 2017-10-27 DIAGNOSIS — Z76.0 MEDICATION REFILL: ICD-10-CM

## 2017-10-27 NOTE — TELEPHONE ENCOUNTER
Requested Prescriptions     Pending Prescriptions Disp Refills    HYDROcodone-acetaminophen (NORCO) 5-325 mg per tablet 120 Tab 0     Sig: Take 1 Tab by mouth every six (6) hours as needed for Pain. Max Daily Amount: 4 Tabs.

## 2017-10-27 NOTE — TELEPHONE ENCOUNTER
printed and placed in PCP medication refill review folder.      Last UDS date: 6/29/20172  Pain contract signed: 1/2016  Last office visit: 6/29/2017  Next Appt: n/a

## 2017-10-30 RX ORDER — HYDROCODONE BITARTRATE AND ACETAMINOPHEN 5; 325 MG/1; MG/1
1 TABLET ORAL
Qty: 120 TAB | Refills: 0 | Status: SHIPPED | OUTPATIENT
Start: 2017-10-30 | End: 2017-11-27 | Stop reason: SDUPTHER

## 2017-10-30 NOTE — TELEPHONE ENCOUNTER
Printed rx for:    Requested Prescriptions     Signed Prescriptions Disp Refills    HYDROcodone-acetaminophen (NORCO) 5-325 mg per tablet 120 Tab 0     Sig: Take 1 Tab by mouth every six (6) hours as needed for Pain. Max Daily Amount: 4 Tabs. Authorizing Provider: Kat Marcus  and no aberrancies seen. 2 weeks prior to 10/2017 refill, she needs to come in for UDS. This is a send out lab test.     Please let pt know that this is ready for .

## 2017-11-21 ENCOUNTER — OFFICE VISIT (OUTPATIENT)
Dept: INTERNAL MEDICINE CLINIC | Age: 27
End: 2017-11-21

## 2017-11-21 ENCOUNTER — HOSPITAL ENCOUNTER (OUTPATIENT)
Dept: LAB | Age: 27
Discharge: HOME | End: 2017-11-21
Payer: MEDICARE

## 2017-11-21 VITALS
BODY MASS INDEX: 46.38 KG/M2 | OXYGEN SATURATION: 97 % | WEIGHT: 252 LBS | DIASTOLIC BLOOD PRESSURE: 77 MMHG | HEIGHT: 62 IN | RESPIRATION RATE: 20 BRPM | SYSTOLIC BLOOD PRESSURE: 129 MMHG | HEART RATE: 87 BPM | TEMPERATURE: 98.4 F

## 2017-11-21 DIAGNOSIS — H66.90 ACUTE OTITIS MEDIA, UNSPECIFIED OTITIS MEDIA TYPE: Primary | ICD-10-CM

## 2017-11-21 DIAGNOSIS — Z76.0 MEDICATION REFILL: ICD-10-CM

## 2017-11-21 PROCEDURE — G0480 DRUG TEST DEF 1-7 CLASSES: HCPCS | Performed by: FAMILY MEDICINE

## 2017-11-21 RX ORDER — CETIRIZINE HYDROCHLORIDE, PSEUDOEPHEDRINE HYDROCHLORIDE 5; 120 MG/1; MG/1
1 TABLET, FILM COATED, EXTENDED RELEASE ORAL 2 TIMES DAILY
Qty: 24 TAB | Refills: 3 | Status: SHIPPED | OUTPATIENT
Start: 2017-11-21 | End: 2018-02-08

## 2017-11-21 RX ORDER — ALBUTEROL SULFATE 0.83 MG/ML
2.5 SOLUTION RESPIRATORY (INHALATION)
Qty: 100 EACH | Refills: 5 | Status: SHIPPED | OUTPATIENT
Start: 2017-11-21 | End: 2019-08-01 | Stop reason: SDUPTHER

## 2017-11-21 RX ORDER — AMOXICILLIN 500 MG/1
500 CAPSULE ORAL 2 TIMES DAILY
Qty: 10 CAP | Refills: 0 | Status: SHIPPED | OUTPATIENT
Start: 2017-11-21 | End: 2017-11-26

## 2017-11-21 NOTE — PROGRESS NOTES
Jazzy Neves presents today for   Chief Complaint   Patient presents with    Ear Pain     bilateral x 1 week       Muna Orta preferred language for health care discussion is english/other. Pt stated she has not tried otc medication for relief. Is someone accompanying this pt? no    Is the patient using any DME equipment during OV? no    Depression Screening:  PHQ over the last two weeks 11/21/2017 6/29/2017 1/30/2017 8/25/2016 6/13/2016 10/5/2015 10/16/2014   PHQ Not Done - Active Diagnosis of Depression or Bipolar Disorder Active Diagnosis of Depression or Bipolar Disorder - - - -   Little interest or pleasure in doing things Not at all Not at all - Not at all Not at all Not at all Not at all   Feeling down, depressed or hopeless Not at all More than half the days - Not at all Not at all Not at all Not at all   Total Score PHQ 2 0 2 - 0 0 0 0       Learning Assessment:  Learning Assessment 4/27/2015   PRIMARY LEARNER Patient   HIGHEST LEVEL OF EDUCATION - PRIMARY LEARNER  DID NOT GRADUATE HIGH SCHOOL   BARRIERS PRIMARY LEARNER NONE   CO-LEARNER CAREGIVER No   PRIMARY LANGUAGE ENGLISH   LEARNER PREFERENCE PRIMARY READING   ANSWERED BY patient   RELATIONSHIP SELF       Abuse Screening:  Abuse Screening Questionnaire 4/27/2015   Do you ever feel afraid of your partner? N   Are you in a relationship with someone who physically or mentally threatens you? N   Is it safe for you to go home? Y       Fall Risk  N/I    Health Maintenance reviewed and discussed per provider. Yes    Pt will f/u with PCP for HM. Advance Directive:  1. Do you have an advance directive in place? Patient Reply: No    2. If not, would you like material regarding how to put one in place? Patient Reply: No    Coordination of Care:  1. Have you been to the ER, urgent care clinic since your last visit? Hospitalized since your last visit? no    2.  Have you seen or consulted any other health care providers outside of the [facility] [address] since your last visit?  I No

## 2017-11-21 NOTE — MR AVS SNAPSHOT
Visit Information Date & Time Provider Department Dept. Phone Encounter #  
 11/21/2017 10:15 AM [address] [phone] [id number] Upcoming Health Maintenance Date Due  
 FOOT EXAM Q1 4/22/2000 MICROALBUMIN Q1 4/22/2000 EYE EXAM RETINAL OR DILATED Q1 4/22/2000 LIPID PANEL Q1 12/2/2014 PAP AKA CERVICAL CYTOLOGY 12/2/2016 HEMOGLOBIN A1C Q6M 6/6/2017 Influenza Age 5 to Adult 8/1/2017 DTaP/Tdap/Td series (2 - Td) 1/30/2027 Allergies as of 11/21/2017  Review Complete On: 11/21/2017 By: Francesca Perez LPN Severity Noted Reaction Type Reactions Other Food High 11/22/2016    Anaphylaxis Marlys Imam Argan Kernal Oil ([place])  12/06/2016    Hives Naproxen  12/07/2012   Side Effect Swelling Percocet [Oxycodone-acetaminophen]  12/07/2012   Side Effect Itching Current Immunizations  Reviewed on 8/25/2016 Name Date  
 TB Skin Test (PPD) Intradermal 8/25/2016 Not reviewed this visit You Were Diagnosed With   
  
 Codes Comments Acute otitis media, unspecified otitis media type    -  Primary ICD-10-CM: H66.90 ICD-9-CM: 382.9 Medication refill     ICD-10-CM: Z76.0 ICD-9-CM: V68.1 Vitals BP Pulse Temp Resp Height(growth percentile) Weight(growth percentile) 129/77 (BP 1 Location: Left arm, BP Patient Position: Sitting) 87 98.4 °F (36.9 °C) (Oral) 20 5' 2\" (1.575 m) 252 lb (114.3 kg) SpO2 BMI OB Status Smoking Status 97% 46.09 kg/m2 Unknown Current Every Day Smoker BMI and BSA Data Body Mass Index Body Surface Area 46.09 kg/m 2 2.24 m 2 Preferred Pharmacy Pharmacy Name Phone [id number] - [place], [address]. [phone] Your Updated Medication List  
  
   
This list is accurate as of: 11/21/17 10:42 AM.  Always use your most recent med list.  
  
  
  
  
 * albuterol 90 mcg/actuation inhaler Commonly known as:  PROVENTIL HFA, VENTOLIN HFA, PROAIR HFA Take 2 Puffs by inhalation every six (6) hours as needed for Wheezing. * albuterol 2.5 mg /3 mL (0.083 %) nebulizer solution Commonly known as:  PROVENTIL VENTOLIN  
3 mL by Nebulization route three (3) times daily as needed for Wheezing. amoxicillin 500 mg capsule Commonly known as:  AMOXIL Take 1 Cap by mouth two (2) times a day for 5 days. BENADRYL 25 mg capsule Generic drug:  diphenhydrAMINE 25 mg. buPROPion 100 mg tablet Commonly known as:  [facility] Take  by Mouth.  
  
 cetirizine-psuedoePHEDrine 5-120 mg per tablet Commonly known as:  ZyrTEC-D Take 1 Tab by mouth two (2) times a day. citalopram 40 mg tablet Commonly known as:  Charisse Frieze Take  by Mouth. cyclobenzaprine 5 mg tablet Commonly known as:  FLEXERIL Take 1 Tab by mouth three (3) times daily as needed for Muscle Spasm(s). diclofenac EC 75 mg EC tablet Commonly known as:  VOLTAREN Take 1 Tab by mouth two (2) times a day. EPIPEN 0.3 mg/0.3 mL injection Generic drug:  EPINEPHrine  
0.3 mg.  
  
 famotidine 20 mg tablet Commonly known as:  PEPCID Take 1 Tab by mouth daily. * fluticasone 110 mcg/actuation inhaler Commonly known as:  FLOVENT HFA Take 1 Puff by inhalation every twelve (12) hours. * fluticasone 50 mcg/actuation nasal spray Commonly known as:  [place] 2 Sprays by Both Nostrils route daily. HYDROcodone-acetaminophen 5-325 mg per tablet Commonly known as:  Yanci Barrs Take 1 Tab by mouth every six (6) hours as needed for Pain. Max Daily Amount: 4 Tabs. metFORMIN 500 mg tablet Commonly known as:  GLUCOPHAGE Take 1 Tab by mouth two (2) times daily (with meals). montelukast 10 mg tablet Commonly known as:  SINGULAIR Take 1 Tab by mouth daily.  Indications: ALLERGIC RHINITIS, ASTHMA PREVENTION  
  
 ondansetron 4 mg disintegrating tablet Commonly known as:  ZOFRAN ODT  
4 mg. TESSALON PERLES 100 mg capsule Generic drug:  benzonatate Take 1 capsule 3 times daily as needed for coughing, swallow capsules whole. * Notice: This list has 4 medication(s) that are the same as other medications prescribed for you. Read the directions carefully, and ask your doctor or other care provider to review them with you. Prescriptions Sent to Pharmacy Refills  
 albuterol (PROVENTIL VENTOLIN) 2.5 mg /3 mL (0.083 %) nebulizer solution 5 Sig: 3 mL by Nebulization route three (3) times daily as needed for Wheezing. Class: Normal  
 Pharmacy: [facility], [address]. Ph #: [phone] Route: Nebulization  
 amoxicillin (AMOXIL) 500 mg capsule 0 Sig: Take 1 Cap by mouth two (2) times a day for 5 days. Class: Normal  
 Pharmacy: [facility], [address]. Ph #: [phone] Route: Oral  
 cetirizine-psuedoePHEDrine (ZYRTEC-D) 5-120 mg per tablet 3 Sig: Take 1 Tab by mouth two (2) times a day. Class: Normal  
 Pharmacy: [facility], [address]. Ph #: [phone] Route: Oral  
  
To-Do List   
 11/21/2017 Lab:  Renetta Salter 13 ()   
  
  
[facility]! Dear Kiara Levels: 
Thank you for requesting a [employer] account. Our records indicate that you already have an active [employer] account. You can access your account anytime at [email or web address] Did you know that you can access your hospital and ER discharge instructions at any time in [employer]? You can also review all of your test results from your hospital stay or ER visit. Additional Information If you have questions, please visit the Frequently Asked Questions section of the [employer] website at [email or web address] Remember, MyChart is NOT to be used for urgent needs. For medical emergencies, dial 911. Now available from your iPhone and Android! Please provide this summary of care documentation to your next provider. Your primary care clinician is listed as [facility]. If you have any questions after today's visit, please call [phone].

## 2017-11-21 NOTE — PROGRESS NOTES
FAMILY MEDICINE CLINIC NOTE    S: Right ear throbing for the last week, mild non-productive cough, no sinus congestion, no sore throat, no ear discharge. History of otitis media last year. No fever. No sick contacts. The patient is also requesting a refill of her norco, informed the patient that her refill is not due until the end of the month, will collect urine  toxicology today and instruct patient to call her PCP early next week for a medication refill. Current Outpatient Prescriptions:     albuterol (PROVENTIL VENTOLIN) 2.5 mg /3 mL (0.083 %) nebulizer solution, 3 mL by Nebulization route three (3) times daily as needed for Wheezing., Disp: 100 Each, Rfl: 5    amoxicillin (AMOXIL) 500 mg capsule, Take 1 Cap by mouth two (2) times a day for 5 days. , Disp: 10 Cap, Rfl: 0    cetirizine-psuedoePHEDrine (ZYRTEC-D) 5-120 mg per tablet, Take 1 Tab by mouth two (2) times a day., Disp: 24 Tab, Rfl: 3    HYDROcodone-acetaminophen (NORCO) 5-325 mg per tablet, Take 1 Tab by mouth every six (6) hours as needed for Pain. Max Daily Amount: 4 Tabs., Disp: 120 Tab, Rfl: 0    metFORMIN (GLUCOPHAGE) 500 mg tablet, Take 1 Tab by mouth two (2) times daily (with meals). , Disp: 60 Tab, Rfl: 5    fluticasone (FLONASE) 50 mcg/actuation nasal spray, 2 Sprays by Both Nostrils route daily. , Disp: 1 Bottle, Rfl: 5    famotidine (PEPCID) 20 mg tablet, Take 1 Tab by mouth daily. , Disp: 90 Tab, Rfl: 5    benzonatate (TESSALON PERLES) 100 mg capsule, Take 1 capsule 3 times daily as needed for coughing, swallow capsules whole., Disp: , Rfl:     buPROPion (WELLBUTRIN) 100 mg tablet, Take  by Mouth., Disp: , Rfl:     citalopram (CELEXA) 40 mg tablet, Take  by Mouth., Disp: , Rfl:     diphenhydrAMINE (BENADRYL) 25 mg capsule, 25 mg., Disp: , Rfl:     EPINEPHrine (EPIPEN) 0.3 mg/0.3 mL injection, 0.3 mg., Disp: , Rfl:     ondansetron (ZOFRAN ODT) 4 mg disintegrating tablet, 4 mg., Disp: , Rfl:     cyclobenzaprine (FLEXERIL) 5 mg tablet, Take 1 Tab by mouth three (3) times daily as needed for Muscle Spasm(s). , Disp: 20 Tab, Rfl: 0    diclofenac EC (VOLTAREN) 75 mg EC tablet, Take 1 Tab by mouth two (2) times a day., Disp: 60 Tab, Rfl: 1    montelukast (SINGULAIR) 10 mg tablet, Take 1 Tab by mouth daily. Indications: ALLERGIC RHINITIS, ASTHMA PREVENTION, Disp: 30 Tab, Rfl: 5    albuterol (PROVENTIL HFA, VENTOLIN HFA, PROAIR HFA) 90 mcg/actuation inhaler, Take 2 Puffs by inhalation every six (6) hours as needed for Wheezing., Disp: 1 Inhaler, Rfl: 5    fluticasone (FLOVENT HFA) 110 mcg/actuation inhaler, Take 1 Puff by inhalation every twelve (12) hours. , Disp: 1 Inhaler, Rfl: 5    Past Medical History:   Diagnosis Date    ASCUS (atypical squamous cells of undetermined significance) on Pap smear 3/11    Asthma     B[facility] disorder ([facility] [address]     Chlamydia      \"    Chronic back pain     Depression     Diabetes mellitus ([facility] [address] 01/02/2017    Elevated blood sugar     GC (gonococcus infection) 2007/ 2004    HPV (human papilloma virus) infection 3/11    Irregular menses     Schizophrenia ([facility] [address]     Sleep apnea 2/14    Smoker     Suicide attempt 5/08    with tylenol    Suicide attempt 10/09    Trichomonal vaginitis 8/10    Uterine fibroid        History reviewed. No pertinent surgical history.     Social History     Social History    Marital status: SINGLE     Spouse name: N/A    Number of children: N/A    Years of education: N/A     Occupational History    disabled      Social History Main Topics    Smoking status: Current Every Day Smoker     Packs/day: 0.25     Years: 9.00    Smokeless tobacco: Never Used      Comment: unable/unwilling to quit at this time    Alcohol use No    Drug use: No    Sexual activity: Yes     Partners: Male     Birth control/ protection: None     Other Topics Concern    Not on file     Social History Narrative       Family History   Problem Relation Age of Onset    Attention Deficit Hyperactivity Disorder Brother     Bipolar Disorder Brother     Seizures Sister     Hypertension Sister     Other Sister      substance abuse         O:  Visit Vitals    /77 (BP 1 Location: Left arm, BP Patient Position: Sitting)    Pulse 87    Temp 98.4 °F (36.9 °C) (Oral)    Resp 20    Ht 5' 2\" (1.575 m)    Wt 252 lb (114.3 kg)    SpO2 97%    BMI 46.09 kg/m2     NAD, comfortable  No sinus TTP   Distorted right TM, air fluid levels visible  No LAD  Clear posterior oropharynx  RRR, no murmurs  CTABL, no wheezing/ronchi/rales    32 y.o. female      ICD-10-CM ICD-9-CM    1. Acute otitis media, unspecified otitis media type H66.90 382.9 amoxicillin (AMOXIL) 500 mg capsule      cetirizine-psuedoePHEDrine (ZYRTEC-D) 5-120 mg per tablet   2.  Medication refill Z76.0 V68.1 TOXASSURE SELECT 13 (MW)      albuterol (PROVENTIL VENTOLIN) 2.5 mg /3 mL (0.083 %) nebulizer solution

## 2017-11-27 ENCOUNTER — TELEPHONE (OUTPATIENT)
Dept: INTERNAL MEDICINE CLINIC | Age: 27
End: 2017-11-27

## 2017-11-27 DIAGNOSIS — Z76.0 MEDICATION REFILL: ICD-10-CM

## 2017-11-27 PROBLEM — E66.01 OBESITY, MORBID (HCC): Status: ACTIVE | Noted: 2017-11-27

## 2017-11-27 RX ORDER — HYDROCODONE BITARTRATE AND ACETAMINOPHEN 5; 325 MG/1; MG/1
1 TABLET ORAL
Qty: 120 TAB | Refills: 0 | Status: SHIPPED | OUTPATIENT
Start: 2017-11-27 | End: 2018-01-12 | Stop reason: SDUPTHER

## 2017-11-27 NOTE — TELEPHONE ENCOUNTER
printed and placed in PCP medication refill review folder.      Last UDS date: 2017 ( In process)  Pain contract signed: 2016( )  Next Appt: not scheduled  Last appt: 2017 with Dr Jasmin Mills  Last appt with PCP: 2017

## 2017-11-27 NOTE — TELEPHONE ENCOUNTER
reviewed and appropriate activity noted. No adverse activity noted. UDS pending.   Needs new contract signed  Will refill this month but she needs to see me for any additional refills

## 2017-11-27 NOTE — TELEPHONE ENCOUNTER
Patient states the antibiotic that was prescribed last week has not helped the ear infection at all, can something different please be called in.

## 2017-12-04 LAB — TOXASSURE SELECT 13: NORMAL

## 2018-01-10 ENCOUNTER — TELEPHONE (OUTPATIENT)
Dept: INTERNAL MEDICINE CLINIC | Age: 28
End: 2018-01-10

## 2018-01-10 DIAGNOSIS — J45.20 MILD INTERMITTENT ASTHMA WITHOUT COMPLICATION: Primary | ICD-10-CM

## 2018-01-10 NOTE — TELEPHONE ENCOUNTER
I can order the nebulizer and supplies but I was not involved with her CPAP and have never ordered a CPAP machine . I have no knowledge of settings, etc. She needs to talk to whomever ordered it originally  Shereen Brown is the order to go?
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
## 2018-01-10 NOTE — TELEPHONE ENCOUNTER
Contacted pt at [facility] number. Two patient Identifiers confirmed. Advised pt per Dr Julien Clarity notes. Pt stated she will contact her previous provider who gave her CPAP. Pt stated she would like her DME order sent to [facility]. Dr Alek Higgins.

## 2018-01-12 DIAGNOSIS — Z76.0 MEDICATION REFILL: ICD-10-CM

## 2018-01-12 NOTE — TELEPHONE ENCOUNTER
printed and placed in PCP medication refill review folder.      Last UDS date: 2018  Pain contract signed: 2016 ()  Next Appt: not scheduled  Last appt; 2017

## 2018-01-15 ENCOUNTER — TELEPHONE (OUTPATIENT)
Dept: INTERNAL MEDICINE CLINIC | Age: 28
End: 2018-01-15

## 2018-01-15 RX ORDER — HYDROCODONE BITARTRATE AND ACETAMINOPHEN 5; 325 MG/1; MG/1
1 TABLET ORAL
Qty: 120 TAB | Refills: 0 | Status: SHIPPED | OUTPATIENT
Start: 2018-01-15 | End: 2018-02-26 | Stop reason: SDUPTHER

## 2018-01-15 NOTE — TELEPHONE ENCOUNTER
[facility] called to let you know they received the order but are unable to process due to patient's Ins. States they ave forwarded the order to [employer] for processing.

## 2018-02-08 ENCOUNTER — HOSPITAL ENCOUNTER (EMERGENCY)
Age: 28
Discharge: HOME | End: 2018-02-09
Attending: EMERGENCY MEDICINE | Admitting: EMERGENCY MEDICINE
Payer: MEDICARE

## 2018-02-08 DIAGNOSIS — E66.01 OBESITY, MORBID (HCC): ICD-10-CM

## 2018-02-08 DIAGNOSIS — E11.9 CONTROLLED TYPE 2 DIABETES MELLITUS WITHOUT COMPLICATION, WITHOUT LONG-TERM CURRENT USE OF INSULIN (HCC): Chronic | ICD-10-CM

## 2018-02-08 DIAGNOSIS — M54.42 CHRONIC MIDLINE LOW BACK PAIN WITH BILATERAL SCIATICA: Primary | ICD-10-CM

## 2018-02-08 DIAGNOSIS — M54.41 CHRONIC MIDLINE LOW BACK PAIN WITH BILATERAL SCIATICA: Primary | ICD-10-CM

## 2018-02-08 DIAGNOSIS — G89.29 CHRONIC MIDLINE LOW BACK PAIN WITH BILATERAL SCIATICA: Primary | ICD-10-CM

## 2018-02-08 LAB — HCG UR QL: NEGATIVE

## 2018-02-08 PROCEDURE — 81025 URINE PREGNANCY TEST: CPT | Performed by: EMERGENCY MEDICINE

## 2018-02-08 PROCEDURE — 99283 EMERGENCY DEPT VISIT LOW MDM: CPT

## 2018-02-08 PROCEDURE — 82962 GLUCOSE BLOOD TEST: CPT

## 2018-02-08 PROCEDURE — 81001 URINALYSIS AUTO W/SCOPE: CPT | Performed by: EMERGENCY MEDICINE

## 2018-02-08 NOTE — LETTER
NOTIFICATION RETURN TO WORK / SCHOOL 
 
2/9/2018 12:54 AM 
 
Ms. Michel Lara [address] [facility] [phone] To Whom It May Concern: 
 
Michel Lara is currently under the care of [facility] EMERGENCY DEPT. She will return to work/school on: 2/10/18 If there are questions or concerns please have the patient contact our office. Sincerely, Holly Gomez RN

## 2018-02-09 VITALS
BODY MASS INDEX: 47.2 KG/M2 | HEART RATE: 89 BPM | SYSTOLIC BLOOD PRESSURE: 135 MMHG | OXYGEN SATURATION: 99 % | HEIGHT: 61 IN | DIASTOLIC BLOOD PRESSURE: 86 MMHG | RESPIRATION RATE: 16 BRPM | WEIGHT: 250 LBS

## 2018-02-09 LAB
APPEARANCE UR: CLEAR
BILIRUB UR QL: NEGATIVE
COLOR UR: YELLOW
EPITH CASTS URNS QL MICRO: NORMAL /LPF (ref 0–5)
GLUCOSE BLD STRIP.AUTO-MCNC: 142 MG/DL (ref 70–110)
GLUCOSE UR STRIP.AUTO-MCNC: NEGATIVE MG/DL
HGB UR QL STRIP: NEGATIVE
KETONES UR QL STRIP.AUTO: ABNORMAL MG/DL
LEUKOCYTE ESTERASE UR QL STRIP.AUTO: NEGATIVE
NITRITE UR QL STRIP.AUTO: NEGATIVE
PH UR STRIP: 6 [PH] (ref 5–8)
PROT UR STRIP-MCNC: ABNORMAL MG/DL
RBC #/AREA URNS HPF: NORMAL /HPF (ref 0–5)
SP GR UR REFRACTOMETRY: 1.03 (ref 1–1.03)
UROBILINOGEN UR QL STRIP.AUTO: 0.2 EU/DL (ref 0.2–1)
WBC URNS QL MICRO: NORMAL /HPF (ref 0–4)

## 2018-02-09 RX ORDER — DICLOFENAC SODIUM 10 MG/G
GEL TOPICAL 4 TIMES DAILY
Qty: 100 G | Refills: 0 | Status: SHIPPED | OUTPATIENT
Start: 2018-02-09 | End: 2018-03-03

## 2018-02-09 NOTE — ED TRIAGE NOTES
C/o migraines, generalized body aches, feet swelling, urinary frequency, dehydrated. \"I think my sugar is high. \"

## 2018-02-09 NOTE — DISCHARGE INSTRUCTIONS

## 2018-02-09 NOTE — ED PROVIDER NOTES
EMERGENCY DEPARTMENT HISTORY AND PHYSICAL EXAM    12:43 AM      Date: 2/8/2018  Patient Name: Adrian Mathew    History of Presenting Illness     Chief Complaint   Patient presents with    Foot Swelling    Headache    Generalized Body Aches         History Provided By: Patient    Chief Complaint: BLE Pain   Duration:  2 days   Timing:  Acute  Location: BLE   Quality: N/A  Severity: Moderate  Modifying Factors: In the past took Narco to alleviate pain   Associated Symptoms: BLE swelling, mild lower back pain and urinary frequency. Additional History (Context): Adrian Mathew is a 32 y.o. female with diabetes, asthma and chronic back pain  who presents to the ED with c/o acute moderate BLE pain x 2 days. Pt denies injury. States that she thought her BG was high, notes in triage her BG was 142. Associated Sx includes BLE swelling, mild lower back pain, and urinary frequency. Pt notes she has had back pain for years, takes Narco and was doing physical therapy to treat Sx. Notes compliance with DM medications and monitoring what she eats. No fever noted, no other medical problems reported. No further complaint and Sx. PCP: Angel Valencia MD    Current Outpatient Prescriptions   Medication Sig Dispense Refill    diclofenac (VOLTAREN) 1 % gel Apply  to affected area four (4) times daily. 100 g 0    HYDROcodone-acetaminophen (NORCO) 5-325 mg per tablet Take 1 Tab by mouth every six (6) hours as needed for Pain. Max Daily Amount: 4 Tabs. 120 Tab 0    albuterol (PROVENTIL VENTOLIN) 2.5 mg /3 mL (0.083 %) nebulizer solution 3 mL by Nebulization route three (3) times daily as needed for Wheezing. 100 Each 5    metFORMIN (GLUCOPHAGE) 500 mg tablet Take 1 Tab by mouth two (2) times daily (with meals). 60 Tab 5    fluticasone (FLONASE) 50 mcg/actuation nasal spray 2 Sprays by Both Nostrils route daily.  1 Bottle 5    diphenhydrAMINE (BENADRYL) 25 mg capsule 25 mg.      EPINEPHrine (EPIPEN) 0.3 mg/0.3 mL injection 0.3 mg.      albuterol (PROVENTIL HFA, VENTOLIN HFA, PROAIR HFA) 90 mcg/actuation inhaler Take 2 Puffs by inhalation every six (6) hours as needed for Wheezing. 1 Inhaler 5    fluticasone (FLOVENT HFA) 110 mcg/actuation inhaler Take 1 Puff by inhalation every twelve (12) hours. 1 Inhaler 5    famotidine (PEPCID) 20 mg tablet Take 1 Tab by mouth daily. 90 Tab 5    montelukast (SINGULAIR) 10 mg tablet Take 1 Tab by mouth daily. Indications: ALLERGIC RHINITIS, ASTHMA PREVENTION 30 Tab 5       Past History     Past Medical History:  Past Medical History:   Diagnosis Date    ASCUS (atypical squamous cells of undetermined significance) on Pap smear 3/11    Asthma     [facility]     Chlamydia      \"    Chronic back pain     Depression     Diabetes mellitus ([facility] 01/02/2017    Elevated blood sugar     GC (gonococcus infection) 2007/ 2004    HPV (human papilloma virus) infection 3/11    Irregular menses     Schizophrenia ([facility] [facility]     Sleep apnea 2/14    Smoker     Suicide attempt 5/08    with tylenol    Suicide attempt 10/09    Trichomonal vaginitis 8/10    Uterine fibroid        Past Surgical History:  History reviewed. No pertinent surgical history. Family History:  Family History   Problem Relation Age of Onset    Attention Deficit Hyperactivity Disorder Brother     Bipolar Disorder Brother     Seizures Sister     Hypertension Sister     Other Sister      substance abuse       Social History:  Social History   Substance Use Topics    Smoking status: Current Every Day Smoker     Packs/day: 0.25     Years: 9.00    Smokeless tobacco: Never Used      Comment: unable/unwilling to quit at this time    Alcohol use No       Allergies:   Allergies   Allergen Reactions    Other Food Anaphylaxis     [place], guacamole    Argan Kernal Oil (Arganina Spinosa) Hives    Naproxen Swelling    Percocet [Oxycodone-Acetaminophen] Itching         Review of Systems     Review of Systems Constitutional: Negative for fever. Cardiovascular: Positive for leg swelling (BLE). Genitourinary: Positive for frequency. Musculoskeletal: Positive for back pain (mild ). Positive for BLE pain    All other systems reviewed and are negative. Physical Exam     Visit Vitals    /86 (BP 1 Location: Right arm, BP Patient Position: Sitting)    Pulse 89    Resp 16    Ht 5' 1\" (1.549 m)    Wt 113.4 kg (250 lb)    SpO2 99%    BMI 47.24 kg/m2       Physical Exam   Constitutional:   General:  Well-developed, well-nourished, morbid obesity. Head:  Normocephalic atraumatic. Eyes:  Pupils midrange extraocular movements intact. No pallor or conjunctival injection. Nose:  No rhinorrhea, inspection grossly normal.    Ears:  Grossly normal to inspection, no discharge. Mouth:  Mucous membranes moist, no appreciable intraoral lesion. Neck/Back:  Trachea midline, no asymmetry. No pain on palpation down cervical, thoracic, or lumbar spine step-off or deformity. Chest:  Grossly normal inspection, symmetric chest rise. Pulmonary:  Clear to auscultation bilaterally no wheezes rhonchi or rales. Cardiovascular:  S1-S2 no murmurs rubs or gallops. Abdomen: Soft, nontender, nondistended no guarding rebound or peritoneal signs. Extremities:  Grossly normal to inspection, peripheral pulses intact in all 4 extremities easily palpable dorsalis pedis pulses. Toe flexion and extension and ankle flexion and extension normal bilaterally    Neurologic:  Alert and oriented no appreciable focal neurologic deficit. Skin:  Warm and dry  Psychiatric:  Grossly normal mood and affect. Nursing note reviewed, vital signs reviewed.            Diagnostic Study Results     Labs -  Recent Results (from the past 12 hour(s))   URINALYSIS W/ RFLX MICROSCOPIC    Collection Time: 02/08/18 10:57 PM   Result Value Ref Range    Color YELLOW      Appearance CLEAR      Specific gravity 1.027 1.005 - 1.030 pH (UA) 6.0 5.0 - 8.0      Protein TRACE (A) NEG mg/dL    Glucose NEGATIVE  NEG mg/dL    Ketone TRACE (A) NEG mg/dL    Bilirubin NEGATIVE  NEG      Blood NEGATIVE  NEG      Urobilinogen 0.2 0.2 - 1.0 EU/dL    Nitrites NEGATIVE  NEG      Leukocyte Esterase NEGATIVE  NEG     HCG URINE, QL    Collection Time: 02/08/18 10:57 PM   Result Value Ref Range    HCG urine, QL NEGATIVE  NEG     URINE MICROSCOPIC ONLY    Collection Time: 02/08/18 10:57 PM   Result Value Ref Range    WBC 0 to 3 0 - 4 /hpf    RBC 0 to 3 0 - 5 /hpf    Epithelial cells FEW 0 - 5 /lpf       Radiologic Studies -   No orders to display         Medical Decision Making   I am the first provider for this patient. I reviewed the vital signs, available nursing notes, past medical history, past surgical history, family history and social history. Vital Signs-Reviewed the patient's vital signs. ED course:  Patient presents with low back pain rate down bilateral legs history of chronic back pain has been using anti-inflammatories. Urinalysis without pregnancy or infection. She reports no recent trauma no red flags or back pain, no indication for imaging at this time suggest that she follow up with primary care physician for further management of her pain we'll give her topical NSAID    Blood sugar here not elevated    Patient presenting with back pain. There are no red flags of back pain. Neurovascular exam is unremarkable, there is no bowel or bladder incontinence, fever, injection drug use reported. Discussed wide differential of back pain. Patient was given her usual anticipatory guidance for this diagnosis. Instructed to follow with primary care physician for further testing and referral to specialist if first-line therapy does not relieve symptoms.     At this time patient was felt to be stable for outpatient management and follow with primary care/specialist.  Patient was instructed to return to the emergency department with any concerns. Disposition:    Discharged home      Portions of this chart were created with Dragon medical speech to text program.   Unrecognized errors may be present. Diagnosis     Clinical Impression:   1. Chronic midline low back pain with bilateral sciatica    2. Obesity, morbid ([address]    3. Controlled type 2 diabetes mellitus without complication, without long-term current use of insulin ([address]          Follow-up Information     Follow up With Details Comments Contact Arnita Duane, MD Call in 2 days  [facility]  [address] [facility] EMERGENCY DEPT  As needed, If symptoms worsen [address]  [phone]           Discharge Medication List as of 2/9/2018 12:35 AM      START taking these medications    Details   diclofenac (VOLTAREN) 1 % gel Apply  to affected area four (4) times daily. , Print, Disp-100 g, R-0         CONTINUE these medications which have NOT CHANGED    Details   HYDROcodone-acetaminophen (NORCO) 5-325 mg per tablet Take 1 Tab by mouth every six (6) hours as needed for Pain. Max Daily Amount: 4 Tabs., Print, Disp-120 Tab, R-0      albuterol (PROVENTIL VENTOLIN) 2.5 mg /3 mL (0.083 %) nebulizer solution 3 mL by Nebulization route three (3) times daily as needed for Wheezing., Normal, Disp-100 Each, R-5      metFORMIN (GLUCOPHAGE) 500 mg tablet Take 1 Tab by mouth two (2) times daily (with meals). , Normal, Disp-60 Tab, R-5      fluticasone (FLONASE) 50 mcg/actuation nasal spray 2 Sprays by Both Nostrils route daily. , Normal, Disp-1 Bottle, R-5      diphenhydrAMINE (BENADRYL) 25 mg capsule 25 mg., Historical Med      EPINEPHrine (EPIPEN) 0.3 mg/0.3 mL injection 0.3 mg., Historical Med      albuterol (PROVENTIL HFA, VENTOLIN HFA, PROAIR HFA) 90 mcg/actuation inhaler Take 2 Puffs by inhalation every six (6) hours as needed for Wheezing., Normal, Disp-1 Inhaler, R-5      fluticasone (FLOVENT HFA) 110 mcg/actuation inhaler Take 1 Puff by inhalation every twelve (12) hours. , Normal, Disp-1 Inhaler, R-5      famotidine (PEPCID) 20 mg tablet Take 1 Tab by mouth daily. , Normal, Disp-90 Tab, R-5      montelukast (SINGULAIR) 10 mg tablet Take 1 Tab by mouth daily. Indications: ALLERGIC RHINITIS, ASTHMA PREVENTION, Normal, Disp-30 Tab, R-5         STOP taking these medications       diclofenac EC (VOLTAREN) 75 mg EC tablet Comments:   Reason for Stopping:             _______________________________    Attestations:  Scribe Attestation     Rachel Mcpherson acting as a scribe for and in the presence of Debbie Lu MD      February 09, 2018 at 12:43 AM       Provider Attestation:      I personally performed the services described in the documentation, reviewed the documentation, as recorded by the scribe in my presence, and it accurately and completely records my words and actions.  February 09, 2018 at 12:43 AM - Debbie Lu MD    _______________________________

## 2018-02-12 ENCOUNTER — PATIENT OUTREACH (OUTPATIENT)
Dept: INTERNAL MEDICINE CLINIC | Age: 28
End: 2018-02-12

## 2018-02-12 DIAGNOSIS — Z76.0 MEDICATION REFILL: ICD-10-CM

## 2018-02-12 RX ORDER — HYDROCODONE BITARTRATE AND ACETAMINOPHEN 5; 325 MG/1; MG/1
1 TABLET ORAL
Qty: 120 TAB | Refills: 0 | OUTPATIENT
Start: 2018-02-12
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
## 2018-02-14 ENCOUNTER — OFFICE VISIT (OUTPATIENT)
Dept: INTERNAL MEDICINE CLINIC | Age: 28
End: 2018-02-14

## 2018-02-14 ENCOUNTER — HOSPITAL ENCOUNTER (OUTPATIENT)
Dept: LAB | Age: 28
Discharge: HOME | End: 2018-02-14
Payer: MEDICARE

## 2018-02-14 VITALS
HEART RATE: 94 BPM | BODY MASS INDEX: 48.15 KG/M2 | OXYGEN SATURATION: 98 % | HEIGHT: 61 IN | WEIGHT: 255 LBS | RESPIRATION RATE: 18 BRPM | SYSTOLIC BLOOD PRESSURE: 139 MMHG | TEMPERATURE: 98.3 F | DIASTOLIC BLOOD PRESSURE: 91 MMHG

## 2018-02-14 DIAGNOSIS — G89.29 CHRONIC LOW BACK PAIN WITHOUT SCIATICA, UNSPECIFIED BACK PAIN LATERALITY: Primary | Chronic | ICD-10-CM

## 2018-02-14 DIAGNOSIS — G89.29 CHRONIC LOW BACK PAIN WITHOUT SCIATICA, UNSPECIFIED BACK PAIN LATERALITY: Chronic | ICD-10-CM

## 2018-02-14 DIAGNOSIS — E11.9 CONTROLLED TYPE 2 DIABETES MELLITUS WITHOUT COMPLICATION, WITHOUT LONG-TERM CURRENT USE OF INSULIN (HCC): Chronic | ICD-10-CM

## 2018-02-14 DIAGNOSIS — M54.50 CHRONIC LOW BACK PAIN WITHOUT SCIATICA, UNSPECIFIED BACK PAIN LATERALITY: Primary | Chronic | ICD-10-CM

## 2018-02-14 DIAGNOSIS — M54.50 CHRONIC LOW BACK PAIN WITHOUT SCIATICA, UNSPECIFIED BACK PAIN LATERALITY: Chronic | ICD-10-CM

## 2018-02-14 PROCEDURE — G0480 DRUG TEST DEF 1-7 CLASSES: HCPCS | Performed by: FAMILY MEDICINE

## 2018-02-14 NOTE — PROGRESS NOTES
Walker Adame presents today for   Chief Complaint   Patient presents with   [facility] Follow Up     [facility] for back pain and migraines       [place] preferred language for health care discussion is english/other. Is someone accompanying this pt? No    Is the patient using any DME equipment during OV? No    Depression Screening:  PHQ over the last two weeks 2/14/2018 11/21/2017 6/29/2017 1/30/2017 8/25/2016 6/13/2016 10/5/2015   PHQ Not Done - - Active Diagnosis of Depression or Bipolar Disorder Active Diagnosis of Depression or Bipolar Disorder - - -   Little interest or pleasure in doing things Not at all Not at all Not at all - Not at all Not at all Not at all   Feeling down, depressed or hopeless Not at all Not at all More than half the days - Not at all Not at all Not at all   Total Score PHQ 2 0 0 2 - 0 0 0       Learning Assessment:  Learning Assessment 4/27/2015   PRIMARY LEARNER Patient   HIGHEST LEVEL OF EDUCATION - PRIMARY LEARNER  DID NOT GRADUATE HIGH SCHOOL   BARRIERS PRIMARY LEARNER NONE   CO-LEARNER CAREGIVER No   PRIMARY LANGUAGE ENGLISH   LEARNER PREFERENCE PRIMARY READING   ANSWERED BY patient   RELATIONSHIP SELF       Abuse Screening:  Abuse Screening Questionnaire 4/27/2015   Do you ever feel afraid of your partner? N   Are you in a relationship with someone who physically or mentally threatens you? N   Is it safe for you to go home? Y       Fall Risk  No flowsheet data found. Health Maintenance reviewed and discussed per provider. Yes; Will f/u with PCP. Advance Directive:  1. Do you have an advance directive in place? Patient Reply: no    2. If not, would you like material regarding how to put one in place? Patient Reply: No    Coordination of Care:  1. Have you been to the ER, urgent care clinic since your last visit? Hospitalized since your last visit? Yes; [facility]     2.  Have you seen or consulted any other health care providers outside of the [facility] since your last visit?  No

## 2018-02-14 NOTE — PROGRESS NOTES
FAMILY MEDICINE CLINIC NOTE    S: The patient presents for follow up after a recent Emergency room visit for generalized body aches. She was also concerned that her sugar may have been elevated. She would like a refill of her norco. She states that she last took norco 2 days ago. He pain has been adequately controlled. She is in need of a new pain contract with her PCP and a urine toxicology screen.  reviewed and appropriate. Of note, the patient has had 3 urine toxicology screens in the past and they all have been negative for opiods. Current Outpatient Prescriptions on File Prior to Visit   Medication Sig Dispense Refill    diclofenac (VOLTAREN) 1 % gel Apply  to affected area four (4) times daily. 100 g 0    HYDROcodone-acetaminophen (NORCO) 5-325 mg per tablet Take 1 Tab by mouth every six (6) hours as needed for Pain. Max Daily Amount: 4 Tabs. 120 Tab 0    albuterol (PROVENTIL VENTOLIN) 2.5 mg /3 mL (0.083 %) nebulizer solution 3 mL by Nebulization route three (3) times daily as needed for Wheezing. 100 Each 5    metFORMIN (GLUCOPHAGE) 500 mg tablet Take 1 Tab by mouth two (2) times daily (with meals). 60 Tab 5    fluticasone (FLONASE) 50 mcg/actuation nasal spray 2 Sprays by Both Nostrils route daily. 1 Bottle 5    diphenhydrAMINE (BENADRYL) 25 mg capsule 25 mg.      EPINEPHrine (EPIPEN) 0.3 mg/0.3 mL injection 0.3 mg.      albuterol (PROVENTIL HFA, VENTOLIN HFA, PROAIR HFA) 90 mcg/actuation inhaler Take 2 Puffs by inhalation every six (6) hours as needed for Wheezing. 1 Inhaler 5    fluticasone (FLOVENT HFA) 110 mcg/actuation inhaler Take 1 Puff by inhalation every twelve (12) hours. 1 Inhaler 5    famotidine (PEPCID) 20 mg tablet Take 1 Tab by mouth daily. 90 Tab 5    montelukast (SINGULAIR) 10 mg tablet Take 1 Tab by mouth daily. Indications: ALLERGIC RHINITIS, ASTHMA PREVENTION 30 Tab 5     No current facility-administered medications on file prior to visit.         Past Medical History:   Diagnosis Date    ASCUS (atypical squamous cells of undetermined significance) on Pap smear 3/11    Asthma     [facility] disorder ([facility]     Chlamydia      \"    Chronic back pain     Depression     Diabetes mellitus ([facility] 01/02/2017    Elevated blood sugar     GC (gonococcus infection) 2007/ 2004    HPV (human papilloma virus) infection 3/11    Irregular menses     Schizophrenia ([facility]     Sleep apnea 2/14    Smoker     Suicide attempt 5/08    with tylenol    Suicide attempt 10/09    Trichomonal vaginitis 8/10    Uterine fibroid        Social History     Social History    Marital status: SINGLE     Spouse name: N/A    Number of children: N/A    Years of education: N/A     Occupational History    disabled      Social History Main Topics    Smoking status: Current Every Day Smoker     Packs/day: 0.25     Years: 9.00    Smokeless tobacco: Never Used      Comment: unable/unwilling to quit at this time    Alcohol use No    Drug use: No    Sexual activity: Yes     Partners: Male     Birth control/ protection: None     Other Topics Concern    Not on file     Social History Narrative       Family History   Problem Relation Age of Onset    Attention Deficit Hyperactivity Disorder Brother     Bipolar Disorder Brother     Seizures Sister     Hypertension Sister     Other Sister      substance abuse       O:  Visit Vitals    BP (!) 139/91 (BP 1 Location: Left arm, BP Patient Position: Sitting)    Pulse 94    Temp 98.3 °F (36.8 °C) (Oral)    Resp 18    Ht 5' 1\" (1.549 m)    Wt 255 lb (115.7 kg)    SpO2 98%    BMI 48.18 kg/m2     NAD, comfortable  RRR, no murmurs  CTABL, no wheezing/ronchi/rales  No TTP of the back     32 y.o. female      ICD-10-CM ICD-9-CM    1.  Chronic low back pain without sciatica, unspecified back pain laterality M54.5 724.2 TOXASSURE SELECT 13 (MW)    G89.29 338.29 Renew pain contract with PCP  Med refill request to PCP

## 2018-02-14 NOTE — MR AVS SNAPSHOT
[address] [facility], [place], operated by [facility] 
 
 
 [address] [facility] [address] 
[phone] Patient: Alex Davison MRN: [id number] ONW:[birth year] Visit Information Date & Time Provider Department Dept. Phone Encounter #  
 2/14/2018  8:00 AM [address] [phone] [id number] Upcoming Health Maintenance Date Due  
 FOOT EXAM Q1 4/22/2000 MICROALBUMIN Q1 4/22/2000 EYE EXAM RETINAL OR DILATED Q1 4/22/2000 LIPID PANEL Q1 12/2/2014 PAP AKA CERVICAL CYTOLOGY 12/2/2016 HEMOGLOBIN A1C Q6M 6/6/2017 Influenza Age 5 to Adult 8/1/2017 DTaP/Tdap/Td series (2 - Td) 1/30/2027 Allergies as of 2/14/2018  Review Complete On: 2/14/2018 By: Jacques Tyson LPN Severity Noted Reaction Type Reactions Other Food High 11/22/2016    Anaphylaxis Tate Pearl Argan Kernal Oil (Mancel Notice)  12/06/2016    Hives Naproxen  12/07/2012   Side Effect Swelling Percocet [Oxycodone-acetaminophen]  12/07/2012   Side Effect Itching Current Immunizations  Reviewed on 8/25/2016 Name Date  
 TB Skin Test (PPD) Intradermal 8/25/2016 Not reviewed this visit You Were Diagnosed With   
  
 Codes Comments Chronic low back pain without sciatica, unspecified back pain laterality    -  Primary ICD-10-CM: M54.5, G89.29 ICD-9-CM: 724.2, 338.29 Vitals BP Pulse Temp Resp Height(growth percentile) Weight(growth percentile) (!) 139/91 (BP 1 Location: Left arm, BP Patient Position: Sitting) 94 98.3 °F (36.8 °C) (Oral) 18 5' 1\" (1.549 m) 255 lb (115.7 kg) SpO2 BMI OB Status Smoking Status 98% 48.18 kg/m2 Unknown Current Every Day Smoker Vitals History BMI and BSA Data Body Mass Index Body Surface Area  
 48.18 kg/m 2 2.23 m 2 Preferred Pharmacy Pharmacy Name Phone [id number], [address] [place] [address]. [phone] Your Updated Medication List  
  
   
This list is accurate as of: 2/14/18  8:53 AM.  Always use your most recent med list.  
  
  
  
  
 * albuterol 90 mcg/actuation inhaler Commonly known as:  PROVENTIL HFA, VENTOLIN HFA, PROAIR HFA Take 2 Puffs by inhalation every six (6) hours as needed for Wheezing. * albuterol 2.5 mg /3 mL (0.083 %) nebulizer solution Commonly known as:  PROVENTIL VENTOLIN  
3 mL by Nebulization route three (3) times daily as needed for Wheezing. BENADRYL 25 mg capsule Generic drug:  diphenhydrAMINE 25 mg.  
  
 diclofenac 1 % Gel Commonly known as:  VOLTAREN Apply  to affected area four (4) times daily. EPIPEN 0.3 mg/0.3 mL injection Generic drug:  EPINEPHrine  
0.3 mg.  
  
 famotidine 20 mg tablet Commonly known as:  PEPCID Take 1 Tab by mouth daily. * fluticasone 110 mcg/actuation inhaler Commonly known as:  FLOVENT HFA Take 1 Puff by inhalation every twelve (12) hours. * fluticasone 50 mcg/actuation nasal spray Commonly known as:  Phan Pato 2 Sprays by Both Nostrils route daily. HYDROcodone-acetaminophen 5-325 mg per tablet Commonly known as:  Ross Ravens Take 1 Tab by mouth every six (6) hours as needed for Pain. Max Daily Amount: 4 Tabs. metFORMIN 500 mg tablet Commonly known as:  GLUCOPHAGE Take 1 Tab by mouth two (2) times daily (with meals). montelukast 10 mg tablet Commonly known as:  SINGULAIR Take 1 Tab by mouth daily. Indications: ALLERGIC RHINITIS, ASTHMA PREVENTION  
  
 * Notice: This list has 4 medication(s) that are the same as other medications prescribed for you. Read the directions carefully, and ask your doctor or other care provider to review them with you. To-Do List   
 02/14/2018 Lab:  Sa[facility] (ME[facility])   
  
  
Introducing [facility]! Dear Catalina Conception: 
Thank you for requesting a [email or web address] account.   Our records indicate that you already have an active Eoscene account. You can access your account anytime at [email or web address] Did you know that you can access your hospital and ER discharge instructions at any time in Eoscene? You can also review all of your test results from your hospital stay or ER visit. Additional Information If you have questions, please visit the Frequently Asked Questions section of the Eoscene website at [email or web address] Remember, Eoscene is NOT to be used for urgent needs. For medical emergencies, dial 911. Now available from your iPhone and Android! Please provide this summary of care documentation to your next provider. Your primary care clinician is listed as [facility]. If you have any questions after today's visit, please call [phone].

## 2018-02-14 NOTE — ACP (ADVANCE CARE PLANNING)
Advance Directive:  1. Do you have an advance directive in place? Patient Reply: no    2. If not, would you like material regarding how to put one in place?  Patient Reply: No

## 2018-02-19 ENCOUNTER — TELEPHONE (OUTPATIENT)
Dept: INTERNAL MEDICINE CLINIC | Age: 28
End: 2018-02-19

## 2018-02-19 DIAGNOSIS — G47.30 SLEEP APNEA, UNSPECIFIED TYPE: Primary | ICD-10-CM
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
## 2018-02-20 LAB — TOXASSURE SELECT 13: NORMAL

## 2018-02-20 NOTE — TELEPHONE ENCOUNTER
Patient called in stating she just needs an order for CPAP supplies, states the company is already replacing the machine.

## 2018-02-20 NOTE — TELEPHONE ENCOUNTER
We did not set her up for or order her CPAP previously and have no information regarding settings, etc.

## 2018-02-21 ENCOUNTER — PATIENT OUTREACH (OUTPATIENT)
Dept: INTERNAL MEDICINE CLINIC | Age: 28
End: 2018-02-21

## 2018-02-21 NOTE — PROGRESS NOTES
Called pt to check on status. Verified identity using two identifiers. Patient states she is doing well without any problems. Patient did ask about DME that she needs to have replaced and I told her that the forms have been filled out. Patient aware to call if has any problems or develops any new symptoms.

## 2018-02-26 DIAGNOSIS — Z76.0 MEDICATION REFILL: ICD-10-CM

## 2018-02-26 RX ORDER — HYDROCODONE BITARTRATE AND ACETAMINOPHEN 5; 325 MG/1; MG/1
1 TABLET ORAL
Qty: 120 TAB | Refills: 0 | Status: SHIPPED | OUTPATIENT
Start: 2018-02-26 | End: 2018-04-06 | Stop reason: SDUPTHER

## 2018-02-26 NOTE — TELEPHONE ENCOUNTER
printed and placed in PCP medication refill review folder.      Last UDS date: 02/14/2018  Pain contract signed: Pt signed new pain agreement at appt on 02/14/2018 not currently scanned in   Next Appt:not scheduled  Last Appt:02/014/2018

## 2018-02-26 NOTE — TELEPHONE ENCOUNTER
Patient request    Requested Prescriptions     Pending Prescriptions Disp Refills    HYDROcodone-acetaminophen (NORCO) 5-325 mg per tablet 120 Tab 0     Sig: Take 1 Tab by mouth every six (6) hours as needed for Pain. Max Daily Amount: 4 Tabs.

## 2018-03-01 ENCOUNTER — PATIENT OUTREACH (OUTPATIENT)
Dept: INTERNAL MEDICINE CLINIC | Age: 28
End: 2018-03-01

## 2018-04-06 DIAGNOSIS — Z76.0 MEDICATION REFILL: ICD-10-CM

## 2018-04-06 RX ORDER — HYDROCODONE BITARTRATE AND ACETAMINOPHEN 5; 325 MG/1; MG/1
1 TABLET ORAL
Qty: 120 TAB | Refills: 0 | Status: SHIPPED | OUTPATIENT
Start: 2018-04-06 | End: 2018-05-25 | Stop reason: SDUPTHER

## 2018-04-30 ENCOUNTER — TELEPHONE (OUTPATIENT)
Dept: INTERNAL MEDICINE CLINIC | Age: 28
End: 2018-04-30

## 2018-04-30 NOTE — TELEPHONE ENCOUNTER
Attempted to reach patient regarding last eye exam. No answer; left message for pt to return call to the office at [phone]. Will continue to try to contact patient.

## 2018-04-30 NOTE — LETTER
5/2/2018 2:52 PM 
 
Ms. Addepar [address] [facility] [address] [phone] Dear Addepar; 
 
Diabetes can harm your eyes. It can damage the small blood vessels in your retina, or the back of your eye. This condition is called diabetic retinopathy. Diabetes also increases your risk of glaucoma and other eye problems. You may not know your eyes are harmed until the problem is very bad. Your doctor can catch problems early if you get regular eye exams. Even if the doctor who takes care of your diabetes checks your eyes, you need an eye exam every 1 to 2 years by an eye doctor who takes care of people with diabetes. An eye doctor has equipment that can check the back of your eye much better than your regular doctor can. If you have eye problems because of diabetes, you will probably see your eye doctor more often. You may need special treatment to prevent your eye problems from getting worse. Please contact my office at [phone] so that we may assist you with requesting a referral be generated for an appointment to receive this screening with an opthalmologist.  If you have already received this very important screening, and have access to "[employer] please send a message back using the Get Medical Advice message type and let us know when and where, so we can update your records. Sincerely, Bertha Chris MD

## 2018-05-02 NOTE — TELEPHONE ENCOUNTER
Attempted to contact pt, no answer, no return call from pt.  Letter will be sent to current address on file regarding last eye exam.

## 2018-05-09 DIAGNOSIS — Z76.0 MEDICATION REFILL: ICD-10-CM

## 2018-05-09 RX ORDER — METFORMIN HYDROCHLORIDE 500 MG/1
TABLET ORAL
Qty: 60 TAB | Refills: 3 | Status: SHIPPED | OUTPATIENT
Start: 2018-05-09 | End: 2018-06-27 | Stop reason: SDUPTHER

## 2018-05-30 ENCOUNTER — TELEPHONE (OUTPATIENT)
Dept: INTERNAL MEDICINE CLINIC | Age: 28
End: 2018-05-30

## 2018-05-30 DIAGNOSIS — G47.33 OBSTRUCTIVE SLEEP APNEA SYNDROME: Primary | ICD-10-CM

## 2018-05-30 NOTE — TELEPHONE ENCOUNTER
Salinas Billings states per her Ins she needs a sleep study done in order to get a new CPap machine.

## 2018-06-25 ENCOUNTER — HOSPITAL ENCOUNTER (OUTPATIENT)
Dept: LAB | Age: 28
Discharge: HOME | End: 2018-06-25
Payer: MEDICARE

## 2018-06-25 ENCOUNTER — OFFICE VISIT (OUTPATIENT)
Dept: INTERNAL MEDICINE CLINIC | Age: 28
End: 2018-06-25

## 2018-06-25 VITALS
RESPIRATION RATE: 20 BRPM | HEIGHT: 61 IN | DIASTOLIC BLOOD PRESSURE: 99 MMHG | TEMPERATURE: 98.7 F | OXYGEN SATURATION: 97 % | SYSTOLIC BLOOD PRESSURE: 140 MMHG | HEART RATE: 87 BPM | BODY MASS INDEX: 49.09 KG/M2 | WEIGHT: 260 LBS

## 2018-06-25 DIAGNOSIS — Z00.00 MEDICARE ANNUAL WELLNESS VISIT, SUBSEQUENT: Primary | ICD-10-CM

## 2018-06-25 DIAGNOSIS — Z01.419 WELL WOMAN EXAM WITH ROUTINE GYNECOLOGICAL EXAM: ICD-10-CM

## 2018-06-25 DIAGNOSIS — E11.9 CONTROLLED TYPE 2 DIABETES MELLITUS WITHOUT COMPLICATION, WITHOUT LONG-TERM CURRENT USE OF INSULIN (HCC): Chronic | ICD-10-CM

## 2018-06-25 DIAGNOSIS — E66.01 OBESITY, MORBID (HCC): ICD-10-CM

## 2018-06-25 LAB
ANION GAP SERPL CALC-SCNC: 11 MMOL/L (ref 3–18)
APPEARANCE UR: CLEAR
BACTERIA URNS QL MICRO: ABNORMAL /HPF
BILIRUB UR QL: NEGATIVE
BUN SERPL-MCNC: 6 MG/DL (ref 7–18)
BUN/CREAT SERPL: 6 (ref 12–20)
CALCIUM SERPL-MCNC: 8.9 MG/DL (ref 8.5–10.1)
CHLORIDE SERPL-SCNC: 103 MMOL/L (ref 100–108)
CHOLEST SERPL-MCNC: 185 MG/DL
CO2 SERPL-SCNC: 26 MMOL/L (ref 21–32)
COLOR UR: YELLOW
CREAT SERPL-MCNC: 0.93 MG/DL (ref 0.6–1.3)
EPITH CASTS URNS QL MICRO: ABNORMAL /LPF (ref 0–5)
GLUCOSE SERPL-MCNC: 103 MG/DL (ref 74–99)
GLUCOSE UR STRIP.AUTO-MCNC: NEGATIVE MG/DL
HBA1C MFR BLD: 7.7 % (ref 4.2–5.6)
HDLC SERPL-MCNC: 42 MG/DL (ref 40–60)
HDLC SERPL: 4.4 {RATIO} (ref 0–5)
HGB UR QL STRIP: NEGATIVE
KETONES UR QL STRIP.AUTO: NEGATIVE MG/DL
LDLC SERPL CALC-MCNC: 126.8 MG/DL (ref 0–100)
LEUKOCYTE ESTERASE UR QL STRIP.AUTO: NEGATIVE
LIPID PROFILE,FLP: ABNORMAL
NITRITE UR QL STRIP.AUTO: NEGATIVE
PH UR STRIP: 6 [PH] (ref 5–8)
POTASSIUM SERPL-SCNC: 4.2 MMOL/L (ref 3.5–5.5)
PROT UR STRIP-MCNC: ABNORMAL MG/DL
RBC #/AREA URNS HPF: NEGATIVE /HPF (ref 0–5)
SODIUM SERPL-SCNC: 140 MMOL/L (ref 136–145)
SP GR UR REFRACTOMETRY: 1.01 (ref 1–1.03)
TRIGL SERPL-MCNC: 81 MG/DL (ref ?–150)
UROBILINOGEN UR QL STRIP.AUTO: 0.2 EU/DL (ref 0.2–1)
VLDLC SERPL CALC-MCNC: 16.2 MG/DL
WBC URNS QL MICRO: NEGATIVE /HPF (ref 0–4)

## 2018-06-25 PROCEDURE — 36415 COLL VENOUS BLD VENIPUNCTURE: CPT | Performed by: INTERNAL MEDICINE

## 2018-06-25 PROCEDURE — 88142 CYTOPATH C/V THIN LAYER: CPT | Performed by: INTERNAL MEDICINE

## 2018-06-25 PROCEDURE — 87491 CHLMYD TRACH DNA AMP PROBE: CPT | Performed by: INTERNAL MEDICINE

## 2018-06-25 PROCEDURE — 82043 UR ALBUMIN QUANTITATIVE: CPT | Performed by: INTERNAL MEDICINE

## 2018-06-25 PROCEDURE — 83036 HEMOGLOBIN GLYCOSYLATED A1C: CPT | Performed by: INTERNAL MEDICINE

## 2018-06-25 PROCEDURE — 81001 URINALYSIS AUTO W/SCOPE: CPT | Performed by: INTERNAL MEDICINE

## 2018-06-25 PROCEDURE — 80061 LIPID PANEL: CPT | Performed by: INTERNAL MEDICINE

## 2018-06-25 PROCEDURE — 80048 BASIC METABOLIC PNL TOTAL CA: CPT | Performed by: INTERNAL MEDICINE
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
## 2018-06-25 NOTE — PATIENT INSTRUCTIONS
Medicare Wellness Visit, Female    The best way to live healthy is to have a lifestyle where you eat a well-balanced diet, exercise regularly, limit alcohol use, and quit all forms of tobacco/nicotine, if applicable. Regular preventive services are another way to keep healthy. Preventive services (vaccines, screening tests, monitoring & exams) can help personalize your care plan, which helps you manage your own care. Screening tests can find health problems at the earliest stages, when they are easiest to treat. [facility] follows the current, evidence-based guidelines published by the [facility] ([facility]) when recommending preventive services for our patients. Because we follow these guidelines, sometimes recommendations change over time as research supports it. (For example, mammograms used to be recommended annually. Even though Medicare will still pay for an annual mammogram, the newer guidelines recommend a mammogram every two years for women of average risk.)    Of course, you and your provider may decide to screen more often for some diseases, based on your risk and co-morbidities (chronic disease you are already diagnosed with). Preventive services for you include:    - Medicare offers their members a free annual wellness visit, which is time for you and your primary care provider to discuss and plan for your preventive service needs. Take advantage of this benefit every year!    -All people over age 72 should receive the recommended pneumonia vaccines. Current USPSTF guidelines recommend a series of two vaccines for the best pneumonia protection.     -All adults should have a yearly flu vaccine and a tetanus vaccine every 10 years. All adults age 61 years should receive a shingles vaccine once in their lifetime.      -A bone mass density test is recommended when a woman turns 65 to screen for osteoporosis.  This test is only recommended once as a screening. Some providers will use this same test as a disease monitoring tool if you already have osteoporosis. -All adults age 38-68 years who are overweight should have a diabetes screening test once every three years.     -Other screening tests & preventive services for persons with diabetes include: an eye exam to screen for diabetic retinopathy, a kidney function test, a foot exam, and stricter control over your cholesterol.     -Cardiovascular screening for adults with routine risk involves an electrocardiogram (ECG) at intervals determined by the provider.     -Colorectal cancer screenings should be done for adults age 54-65 years with normal risk. There are a number of acceptable methods of screening for this type of cancer. Each test has its own benefits and drawbacks. Discuss with your provider what is most appropriate for you during your annual wellness visit. The different tests include: colonoscopy (considered the best screening method), a fecal occult blood test, a fecal DNA test, and sigmoidoscopy. -Breast cancer screenings are recommended every other year for women of normal risk age 54-69 years.     -Cervical cancer screenings for women over age 72 are only recommended with certain risk factors.     -All adults born between [facility] should be screened once for Hepatitis C.      Here is a list of your current Health Maintenance items (your personalized list of preventive services) with a due date:  Health Maintenance Due   Topic Date Due    Diabetic Foot Care  04/22/2000    Albumin Urine Test  04/22/2000    Eye Exam  04/22/2000    Cholesterol Test   12/02/2014    Cervical Cancer Screening  12/02/2016    Hemoglobin A1C    06/06/2017    Annual Well Visit  03/14/2018

## 2018-06-25 NOTE — PROGRESS NOTES
ROOM # 5  Pt reports having anxiety attacks recently. Pt would like to see about getting medication for this. Juan Perez presents today for   Chief Complaint   Patient presents with    Well Woman       Juan Perez preferred language for health care discussion is english/other. Is someone accompanying this pt? no    Is the patient using any DME equipment during OV? no    Depression Screening:  PHQ over the last two weeks 6/25/2018 2/14/2018 11/21/2017 6/29/2017 1/30/2017 8/25/2016 6/13/2016   PHQ Not Done - - - Active Diagnosis of Depression or Bipolar Disorder Active Diagnosis of Depression or Bipolar Disorder - -   Little interest or pleasure in doing things Several days Not at all Not at all Not at all - Not at all Not at all   Feeling down, depressed or hopeless Several days Not at all Not at all More than half the days - Not at all Not at all   Total Score PHQ 2 2 0 0 2 - 0 0       Learning Assessment:  Learning Assessment 4/27/2015   PRIMARY LEARNER Patient   HIGHEST LEVEL OF EDUCATION - PRIMARY LEARNER  DID NOT GRADUATE HIGH SCHOOL   BARRIERS PRIMARY LEARNER NONE   CO-LEARNER CAREGIVER No   PRIMARY LANGUAGE ENGLISH   LEARNER PREFERENCE PRIMARY READING   ANSWERED BY patient   RELATIONSHIP SELF       Abuse Screening:  Abuse Screening Questionnaire 6/25/2018 4/27/2015   Do you ever feel afraid of your partner? N N   Are you in a relationship with someone who physically or mentally threatens you? N N   Is it safe for you to go home?  Horacio Dowell       ADL Assessment:  ADL Assessment 6/25/2018   Feeding yourself No Help Needed   Getting from bed to chair No Help Needed   Getting dressed No Help Needed   Bathing or showering No Help Needed   Walk across the room (includes cane/walker) No Help Needed   Using the telphone No Help Needed   Taking your medications No Help Needed   Preparing meals No Help Needed   Managing money (expenses/bills) No Help Needed   Moderately strenuous housework (laundry) No Help Needed   Shopping for personal items (toiletries/medicines) No Help Needed   Shopping for groceries No Help Needed   Driving No Help Needed   Climbing a flight of stairs No Help Needed   Getting to places beyond walking distances No Help Needed       Fall Risk:  No flowsheet data found. Health Maintenance reviewed and discussed per provider. Yes    Melissa Melo is due for   Health Maintenance Due   Topic Date Due    FOOT EXAM Q1  04/22/2000    MICROALBUMIN Q1  04/22/2000    EYE EXAM RETINAL OR DILATED Q1  04/22/2000    LIPID PANEL Q1  12/02/2014    PAP AKA CERVICAL CYTOLOGY  12/02/2016    HEMOGLOBIN A1C Q6M  06/06/2017    MEDICARE YEARLY EXAM  03/14/2018     Please order/place referral if appropriate. Advance Directive:  1. Do you have an advance directive in place? Patient Reply: no    2. If not, would you like material regarding how to put one in place? Patient Reply: no    Coordination of Care:  1. Have you been to the ER, urgent care clinic since your last visit? Hospitalized since your last visit? Yes, ER for ear infection    2. Have you seen or consulted any other health care providers outside of the [address] [place] [address] [place] since your last visit? Include any pap smears or colon screening.  yes

## 2018-06-25 NOTE — MR AVS SNAPSHOT
[address] [place] [address] 
 
 
 [address] [facility] [id number] 
[phone] Patient: Lisa Forrester MRN: [id number] QME:[birth year]508 Visit Information Date & Time Provider Department Dept. Phone Encounter #  
 6/25/2018 10:30 AM Hetal Mccartney MD [facility] [phone] [id number] Follow-up Instructions Return in about 4 months (around 10/25/2018) for HTN, DM. Upcoming Health Maintenance Date Due  
 FOOT EXAM Q1 4/22/2000 MICROALBUMIN Q1 4/22/2000 EYE EXAM RETINAL OR DILATED Q1 4/22/2000 LIPID PANEL Q1 12/2/2014 PAP AKA CERVICAL CYTOLOGY 12/2/2016 HEMOGLOBIN A1C Q6M 6/6/2017 MEDICARE YEARLY EXAM 3/14/2018 Influenza Age 5 to Adult 8/1/2018 DTaP/Tdap/Td series (2 - Td) 1/30/2027 Allergies as of 6/25/2018  Review Complete On: 6/25/2018 By: Hetal Mccartney MD  
  
 Severity Noted Reaction Type Reactions Other Food High 11/22/2016    Anaphylaxis [facility] Argan Kernal Oil ([employer])  12/06/2016    Hives Naproxen  12/07/2012   Side Effect Swelling Percocet [Oxycodone-acetaminophen]  12/07/2012   Side Effect Itching Current Immunizations  Reviewed on 8/25/2016 Name Date  
 TB Skin Test (PPD) Intradermal 8/25/2016 Not reviewed this visit You Were Diagnosed With   
  
 Codes Comments Medicare annual wellness visit, subsequent    -  Primary ICD-10-CM: Z00.00 ICD-9-CM: V70.0 Well woman exam with routine gynecological exam     ICD-10-CM: H18.488 ICD-9-CM: V72.31 Controlled type 2 diabetes mellitus without complication, without long-term current use of insulin ([facility]     ICD-10-CM: E11.9 ICD-9-CM: 250.00 Obesity, morbid ([facility]     ICD-10-CM: E66.01 
ICD-9-CM: 278.01 Vitals BP Pulse Temp Resp Height(growth percentile) Weight(growth percentile)  (!) 140/99 (BP 1 Location: Right arm, BP Patient Position: Sitting) 87 98.7 °F (37.1 °C) (Oral) 20 5' 1\" (1.549 m) 260 lb (117.9 kg) LMP SpO2 BMI OB Status Smoking Status 01/17/2018 (Approximate) 97% 49.13 kg/m2 Unknown Current Some Day Smoker Vitals History BMI and BSA Data Body Mass Index Body Surface Area  
 49.13 kg/m 2 2.25 m 2 Preferred Pharmacy Pharmacy Name Phone [facility] [phone] Your Updated Medication List  
  
   
This list is accurate as of 6/25/18 11:31 AM.  Always use your most recent med list.  
  
  
  
  
 * albuterol 90 mcg/actuation inhaler Commonly known as:  PROVENTIL HFA, VENTOLIN HFA, PROAIR HFA Take 2 Puffs by inhalation every six (6) hours as needed for Wheezing. * albuterol 2.5 mg /3 mL (0.083 %) nebulizer solution Commonly known as:  PROVENTIL VENTOLIN  
3 mL by Nebulization route three (3) times daily as needed for Wheezing. BENADRYL 25 mg capsule Generic drug:  diphenhydrAMINE 25 mg. EPIPEN 0.3 mg/0.3 mL injection Generic drug:  EPINEPHrine  
0.3 mg.  
  
 famotidine 20 mg tablet Commonly known as:  PEPCID Take 1 Tab by mouth daily. * fluticasone 110 mcg/actuation inhaler Commonly known as:  FLOVENT HFA Take 1 Puff by inhalation every twelve (12) hours. * fluticasone 50 mcg/actuation nasal spray Commonly known as:  Gonzalez Calk 2 Sprays by Both Nostrils route daily. HYDROcodone-acetaminophen 5-325 mg per tablet Commonly known as:  Cheryl Jacques Take 1 Tab by mouth every six (6) hours as needed for Pain. Max Daily Amount: 4 Tabs. ibuprofen 800 mg tablet Commonly known as:  MOTRIN Take 1 Tab by mouth every eight (8) hours as needed for Pain (and inflammation. ). metFORMIN 500 mg tablet Commonly known as:  GLUCOPHAGE  
TAKE 1 TABLET BY MOUTH 2 TIMES DAILY WITH MEALS * Notice:   This list has 4 medication(s) that are the same as other medications prescribed for you. Read the directions carefully, and ask your doctor or other care provider to review them with you. We Performed the Following  DIABETES FOOT EXAM [7 Custom] Follow-up Instructions Return in about 4 months (around 10/25/2018) for HTN, DM. To-Do List   
 06/25/2018 Lab:  HEMOGLOBIN A1C W/O EAG   
  
 06/25/2018 Lab:  LIPID PANEL   
  
 06/25/2018 Lab:  METABOLIC PANEL, BASIC Patient Instructions Medicare Wellness Visit, Female The best way to live healthy is to have a lifestyle where you eat a well-balanced diet, exercise regularly, limit alcohol use, and quit all forms of tobacco/nicotine, if applicable. Regular preventive services are another way to keep healthy. Preventive services (vaccines, screening tests, monitoring & exams) can help personalize your care plan, which helps you manage your own care. Screening tests can find health problems at the earliest stages, when they are easiest to treat. [facility] follows the current, evidence-based guidelines published by the [facility] ([facility]) when recommending preventive services for our patients. Because we follow these guidelines, sometimes recommendations change over time as research supports it. (For example, mammograms used to be recommended annually. Even though Medicare will still pay for an annual mammogram, the newer guidelines recommend a mammogram every two years for women of average risk.) Of course, you and your provider may decide to screen more often for some diseases, based on your risk and co-morbidities (chronic disease you are already diagnosed with). Preventive services for you include: - Medicare offers their members a free annual wellness visit, which is time for you and your primary care provider to discuss and plan for your preventive service needs. Take advantage of this benefit every year! 
 
-All people over age 72 should receive the recommended pneumonia vaccines. Current USPSTF guidelines recommend a series of two vaccines for the best pneumonia protection.  
 
-All adults should have a yearly flu vaccine and a tetanus vaccine every 10 years. All adults age 61 years should receive a shingles vaccine once in their lifetime.   
 
-A bone mass density test is recommended when a woman turns 65 to screen for osteoporosis. This test is only recommended once as a screening. Some providers will use this same test as a disease monitoring tool if you already have osteoporosis. -All adults age 38-68 years who are overweight should have a diabetes screening test once every three years.  
 
-Other screening tests & preventive services for persons with diabetes include: an eye exam to screen for diabetic retinopathy, a kidney function test, a foot exam, and stricter control over your cholesterol.  
 
-Cardiovascular screening for adults with routine risk involves an electrocardiogram (ECG) at intervals determined by the provider.  
 
-Colorectal cancer screenings should be done for adults age 54-65 years with normal risk. There are a number of acceptable methods of screening for this type of cancer. Each test has its own benefits and drawbacks. Discuss with your provider what is most appropriate for you during your annual wellness visit. The different tests include: colonoscopy (considered the best screening method), a fecal occult blood test, a fecal DNA test, and sigmoidoscopy. -Breast cancer screenings are recommended every other year for women of normal risk age 54-69 years.  
 
-Cervical cancer screenings for women over age 72 are only recommended with certain risk factors.  
 
-All adults born [facility] should be screened once for Hepatitis C.   
 
Here is a list of your current Health Maintenance items (your personalized list of preventive services) with a due date: 
Health Maintenance Due Topic Date Due  
 Diabetic Foot Care  04/22/2000  Albumin Urine Test  04/22/2000 Ronnie Lamar Eye Exam  04/22/2000  Cholesterol Test   12/02/2014  Cervical Cancer Screening  12/02/2016  Hemoglobin A1C    06/06/2017 Ronnie Lamar Annual Well Visit  03/14/2018 [facility]! Dear Zaida Rabago: 
Thank you for requesting a soup.me account. Our records indicate that you already have an active [email or web address] account. You can access your account anytime at [email or web address] Did you know that you can access your hospital and ER discharge instructions at any time in [email or web address]? You can also review all of your test results from your hospital stay or ER visit. Additional Information If you have questions, please visit the Frequently Asked Questions section of the [email or web address] website at [email or web address] Remember, [email or web address] is NOT to be used for urgent needs. For medical emergencies, dial 911. Now available from your iPhone and Android! Please provide this summary of care documentation to your next provider. Your primary care clinician is listed as [facility]. If you have any questions after today's visit, please call [phone].

## 2018-06-26 LAB
C TRACH RRNA SPEC QL NAA+PROBE: NEGATIVE
C TRACH RRNA SPEC QL NAA+PROBE: NEGATIVE
CREAT UR-MCNC: 104.8 MG/DL (ref 30–125)
MICROALBUMIN UR-MCNC: 11 MG/DL (ref 0–3)
MICROALBUMIN/CREAT UR-RTO: 105 MG/G (ref 0–30)
N GONORRHOEA RRNA SPEC QL NAA+PROBE: NEGATIVE
N GONORRHOEA RRNA SPEC QL NAA+PROBE: NEGATIVE
SPECIMEN SOURCE: NORMAL
SPECIMEN SOURCE: NORMAL
T VAGINALIS RRNA SPEC QL NAA+PROBE: NEGATIVE

## 2018-06-27 ENCOUNTER — TELEPHONE (OUTPATIENT)
Dept: INTERNAL MEDICINE CLINIC | Age: 28
End: 2018-06-27

## 2018-06-27 DIAGNOSIS — Z76.0 MEDICATION REFILL: ICD-10-CM

## 2018-06-27 RX ORDER — METFORMIN HYDROCHLORIDE 1000 MG/1
1000 TABLET ORAL 2 TIMES DAILY WITH MEALS
Qty: 180 TAB | Refills: 3 | Status: SHIPPED | OUTPATIENT
Start: 2018-06-27 | End: 2019-10-22 | Stop reason: SDUPTHER

## 2018-06-27 NOTE — TELEPHONE ENCOUNTER
Contacted pt. 2 pt identifiers confirmed. Pt notified of results and increased medication. Pt verbalized understanding of all information. No further questions or concerns at this time.

## 2018-06-27 NOTE — TELEPHONE ENCOUNTER
----- Message from Deisi Valencia MD sent at 6/27/2018 11:09 AM EDT -----  Please advise her that her blood sugar is higher. I sending over a higher dose of metformin.

## 2018-10-12 DIAGNOSIS — Z76.0 MEDICATION REFILL: ICD-10-CM

## 2018-10-15 RX ORDER — HYDROCODONE BITARTRATE AND ACETAMINOPHEN 5; 325 MG/1; MG/1
1 TABLET ORAL
Qty: 120 TAB | Refills: 0 | Status: SHIPPED | OUTPATIENT
Start: 2018-10-15 | End: 2019-08-01 | Stop reason: SDUPTHER

## 2018-10-15 NOTE — TELEPHONE ENCOUNTER
Printed rx for:    Requested Prescriptions     Signed Prescriptions Disp Refills    HYDROcodone-acetaminophen (NORCO) 5-325 mg per tablet 120 Tab 0     Sig: Take 1 Tab by mouth every six (6) hours as needed for Pain. Max Daily Amount: 4 Tabs. Authorizing Provider: Severiano [place] PMP and she needs to stay with one pharmacy or her PCP will not be able to refill her medication.

## 2018-11-23 DIAGNOSIS — Z76.0 MEDICATION REFILL: ICD-10-CM

## 2018-11-26 RX ORDER — FLUTICASONE PROPIONATE 110 UG/1
1 AEROSOL, METERED RESPIRATORY (INHALATION) EVERY 12 HOURS
Qty: 1 INHALER | Refills: 5 | Status: SHIPPED | OUTPATIENT
Start: 2018-11-26 | End: 2019-08-01 | Stop reason: SDUPTHER

## 2018-11-26 RX ORDER — HYDROCODONE BITARTRATE AND ACETAMINOPHEN 5; 325 MG/1; MG/1
1 TABLET ORAL
Qty: 120 TAB | Refills: 0 | OUTPATIENT
Start: 2018-11-26

## 2018-11-26 RX ORDER — ALBUTEROL SULFATE 90 UG/1
2 AEROSOL, METERED RESPIRATORY (INHALATION)
Qty: 1 INHALER | Refills: 5 | Status: SHIPPED | OUTPATIENT
Start: 2018-11-26 | End: 2019-08-01 | Stop reason: SDUPTHER

## 2019-01-19 ENCOUNTER — APPOINTMENT (OUTPATIENT)
Dept: GENERAL RADIOLOGY | Age: 29
End: 2019-01-19
Attending: NURSE PRACTITIONER
Payer: MEDICARE

## 2019-01-19 ENCOUNTER — HOSPITAL ENCOUNTER (EMERGENCY)
Age: 29
Discharge: HOME | End: 2019-01-19
Attending: EMERGENCY MEDICINE
Payer: MEDICARE

## 2019-01-19 VITALS
TEMPERATURE: 98.3 F | OXYGEN SATURATION: 99 % | HEART RATE: 93 BPM | DIASTOLIC BLOOD PRESSURE: 101 MMHG | WEIGHT: 250 LBS | BODY MASS INDEX: 47.2 KG/M2 | SYSTOLIC BLOOD PRESSURE: 149 MMHG | HEIGHT: 61 IN | RESPIRATION RATE: 18 BRPM

## 2019-01-19 DIAGNOSIS — R03.0 ELEVATED BLOOD PRESSURE READING: ICD-10-CM

## 2019-01-19 DIAGNOSIS — J20.9 ACUTE BRONCHITIS, UNSPECIFIED ORGANISM: Primary | ICD-10-CM

## 2019-01-19 DIAGNOSIS — R05.9 COUGH: ICD-10-CM

## 2019-01-19 LAB
ANION GAP SERPL CALC-SCNC: 4 MMOL/L (ref 3–18)
BASOPHILS # BLD: 0 K/UL (ref 0–0.1)
BASOPHILS NFR BLD: 0 % (ref 0–2)
BUN SERPL-MCNC: 10 MG/DL (ref 7–18)
BUN/CREAT SERPL: 11 (ref 12–20)
CALCIUM SERPL-MCNC: 8.6 MG/DL (ref 8.5–10.1)
CHLORIDE SERPL-SCNC: 105 MMOL/L (ref 100–108)
CO2 SERPL-SCNC: 30 MMOL/L (ref 21–32)
CREAT SERPL-MCNC: 0.94 MG/DL (ref 0.6–1.3)
DIFFERENTIAL METHOD BLD: ABNORMAL
EOSINOPHIL # BLD: 0.2 K/UL (ref 0–0.4)
EOSINOPHIL NFR BLD: 1 % (ref 0–5)
ERYTHROCYTE [DISTWIDTH] IN BLOOD BY AUTOMATED COUNT: 15.8 % (ref 11.6–14.5)
GLUCOSE SERPL-MCNC: 191 MG/DL (ref 74–99)
HCT VFR BLD AUTO: 38.5 % (ref 35–45)
HGB BLD-MCNC: 12.5 G/DL (ref 12–16)
LYMPHOCYTES # BLD: 4.1 K/UL (ref 0.9–3.6)
LYMPHOCYTES NFR BLD: 29 % (ref 21–52)
MCH RBC QN AUTO: 27.2 PG (ref 24–34)
MCHC RBC AUTO-ENTMCNC: 32.5 G/DL (ref 31–37)
MCV RBC AUTO: 83.9 FL (ref 74–97)
MONOCYTES # BLD: 0.5 K/UL (ref 0.05–1.2)
MONOCYTES NFR BLD: 4 % (ref 3–10)
NEUTS SEG # BLD: 9.3 K/UL (ref 1.8–8)
NEUTS SEG NFR BLD: 66 % (ref 40–73)
PLATELET # BLD AUTO: 294 K/UL (ref 135–420)
PMV BLD AUTO: 10.5 FL (ref 9.2–11.8)
POTASSIUM SERPL-SCNC: 3.8 MMOL/L (ref 3.5–5.5)
RBC # BLD AUTO: 4.59 M/UL (ref 4.2–5.3)
SODIUM SERPL-SCNC: 139 MMOL/L (ref 136–145)
WBC # BLD AUTO: 14 K/UL (ref 4.6–13.2)

## 2019-01-19 PROCEDURE — 96360 HYDRATION IV INFUSION INIT: CPT

## 2019-01-19 PROCEDURE — 71046 X-RAY EXAM CHEST 2 VIEWS: CPT

## 2019-01-19 PROCEDURE — 80048 BASIC METABOLIC PNL TOTAL CA: CPT

## 2019-01-19 PROCEDURE — 74011250636 HC RX REV CODE- 250/636: Performed by: NURSE PRACTITIONER

## 2019-01-19 PROCEDURE — 85025 COMPLETE CBC W/AUTO DIFF WBC: CPT

## 2019-01-19 PROCEDURE — 99282 EMERGENCY DEPT VISIT SF MDM: CPT

## 2019-01-19 RX ORDER — AZITHROMYCIN 250 MG/1
TABLET, FILM COATED ORAL
Qty: 6 TAB | Refills: 0 | Status: SHIPPED | OUTPATIENT
Start: 2019-01-19 | End: 2020-04-20

## 2019-01-19 RX ORDER — CODEINE PHOSPHATE AND GUAIFENESIN 10; 100 MG/5ML; MG/5ML
5 SOLUTION ORAL
Qty: 118 ML | Refills: 0 | Status: SHIPPED | OUTPATIENT
Start: 2019-01-19 | End: 2019-12-10

## 2019-01-19 RX ADMIN — SODIUM CHLORIDE 1000 ML: 900 INJECTION, SOLUTION INTRAVENOUS at 18:39

## 2019-01-20 NOTE — DISCHARGE INSTRUCTIONS
[employer] Activation    Thank you for requesting access to [employer]. Please follow the instructions below to securely access and download your online medical record. [employer] allows you to send messages to your doctor, view your test results, renew your prescriptions, schedule appointments, and more. How Do I Sign Up? 1. In your internet browser, go to [email or web address]  2. Click on the First Time User? Click Here link in the Sign In box. You will be redirect to the New Member Sign Up page. 3. Enter your Novel SuperTV Access Code exactly as it appears below. You will not need to use this code after youve completed the sign-up process. If you do not sign up before the expiration date, you must request a new code. Novel SuperTV Access Code: Activation code not generated  Current Novel SuperTV Status: Active (This is the date your Novel SuperTV access code will )    4. Enter the last four digits of your Social Security Number (xxxx) and Date of Birth (mm/dd/yyyy) as indicated and click Submit. You will be taken to the next sign-up page. 5. Create a Novel SuperTV ID. This will be your Novel SuperTV login ID and cannot be changed, so think of one that is secure and easy to remember. 6. Create a Novel SuperTV password. You can change your password at any time. 7. Enter your Password Reset Question and Answer. This can be used at a later time if you forget your password. 8. Enter your e-mail address. You will receive e-mail notification when new information is available in [address] 9. Click Sign Up. You can now view and download portions of your medical record. 10. Click the Download Summary menu link to download a portable copy of your medical information. Additional Information    If you have questions, please visit the Frequently Asked Questions section of the Novel SuperTV website at [email or web address] Remember, Novel SuperTV is NOT to be used for urgent needs. For medical emergencies, dial 911. Discharge Review:    1. The reason for my/the patient presentation in the  Emergency Department today has been evaluated by            _____ Yes     _____ No  the medical provider to my satisfaction. 2.  The results of studies such as X-Rays and Laboratory  work have been discussed with me/the patient.                       _____ Yes     _____No    3.   I have discussed the discharge diagnosis with the         Medical provider and I understand and agree with the   aftercare plan.                      _____Yes     _____No      Signature  __________________________________________________________

## 2019-07-28 ENCOUNTER — HOSPITAL ENCOUNTER (EMERGENCY)
Age: 29
Discharge: LEFT BEFORE DISCHARGE | End: 2019-07-29
Attending: EMERGENCY MEDICINE
Payer: MEDICARE

## 2019-07-28 VITALS
TEMPERATURE: 97.2 F | WEIGHT: 260 LBS | DIASTOLIC BLOOD PRESSURE: 98 MMHG | OXYGEN SATURATION: 98 % | SYSTOLIC BLOOD PRESSURE: 143 MMHG | BODY MASS INDEX: 49.09 KG/M2 | RESPIRATION RATE: 12 BRPM | HEIGHT: 61 IN | HEART RATE: 88 BPM

## 2019-07-28 PROCEDURE — 75810000275 HC EMERGENCY DEPT VISIT NO LEVEL OF CARE

## 2019-07-29 NOTE — ED TRIAGE NOTES
Patient states she tripped and fell 2 days ago and her left knee hit a rock, which resulted in a superficial laceration. Patient states green discharge came out of the wound today. No swelling or redness noted to knee.

## 2019-07-30 NOTE — ED NOTES
7/30/2019 1725 phoned pt for follow up. Pt advised to return to ED for any emergent issue.  Pt states she went to another facility after leaving [facility] ED.

## 2019-08-01 ENCOUNTER — OFFICE VISIT (OUTPATIENT)
Dept: INTERNAL MEDICINE CLINIC | Age: 29
End: 2019-08-01

## 2019-08-01 ENCOUNTER — HOSPITAL ENCOUNTER (OUTPATIENT)
Dept: LAB | Age: 29
Discharge: HOME | End: 2019-08-01
Payer: MEDICARE

## 2019-08-01 VITALS
DIASTOLIC BLOOD PRESSURE: 89 MMHG | OXYGEN SATURATION: 98 % | BODY MASS INDEX: 47.77 KG/M2 | HEIGHT: 61 IN | RESPIRATION RATE: 20 BRPM | TEMPERATURE: 97.4 F | SYSTOLIC BLOOD PRESSURE: 136 MMHG | HEART RATE: 85 BPM | WEIGHT: 253 LBS

## 2019-08-01 DIAGNOSIS — Z79.891 LONG TERM (CURRENT) USE OF OPIATE ANALGESIC: ICD-10-CM

## 2019-08-01 DIAGNOSIS — E66.01 OBESITY, MORBID (HCC): ICD-10-CM

## 2019-08-01 DIAGNOSIS — G47.33 OBSTRUCTIVE SLEEP APNEA SYNDROME: ICD-10-CM

## 2019-08-01 DIAGNOSIS — E11.9 CONTROLLED TYPE 2 DIABETES MELLITUS WITHOUT COMPLICATION, WITHOUT LONG-TERM CURRENT USE OF INSULIN (HCC): ICD-10-CM

## 2019-08-01 DIAGNOSIS — E11.9 CONTROLLED TYPE 2 DIABETES MELLITUS WITHOUT COMPLICATION, WITHOUT LONG-TERM CURRENT USE OF INSULIN (HCC): Primary | ICD-10-CM

## 2019-08-01 DIAGNOSIS — M54.50 CHRONIC LOW BACK PAIN WITHOUT SCIATICA, UNSPECIFIED BACK PAIN LATERALITY: ICD-10-CM

## 2019-08-01 DIAGNOSIS — Z76.0 MEDICATION REFILL: ICD-10-CM

## 2019-08-01 DIAGNOSIS — G89.29 CHRONIC LOW BACK PAIN WITHOUT SCIATICA, UNSPECIFIED BACK PAIN LATERALITY: ICD-10-CM

## 2019-08-01 LAB
ALBUMIN SERPL-MCNC: 3.5 G/DL (ref 3.4–5)
ALBUMIN/GLOB SERPL: 0.7 {RATIO} (ref 0.8–1.7)
ALP SERPL-CCNC: 133 U/L (ref 45–117)
ALT SERPL-CCNC: 26 U/L (ref 13–56)
ANION GAP SERPL CALC-SCNC: 3 MMOL/L (ref 3–18)
AST SERPL-CCNC: 18 U/L (ref 10–38)
BILIRUB SERPL-MCNC: 0.3 MG/DL (ref 0.2–1)
BUN SERPL-MCNC: 8 MG/DL (ref 7–18)
BUN/CREAT SERPL: 8 (ref 12–20)
CALCIUM SERPL-MCNC: 8.8 MG/DL (ref 8.5–10.1)
CHLORIDE SERPL-SCNC: 104 MMOL/L (ref 100–111)
CHOLEST SERPL-MCNC: 174 MG/DL
CO2 SERPL-SCNC: 31 MMOL/L (ref 21–32)
CREAT SERPL-MCNC: 0.97 MG/DL (ref 0.6–1.3)
CREAT UR-MCNC: 383 MG/DL (ref 30–125)
GLOBULIN SER CALC-MCNC: 4.9 G/DL (ref 2–4)
GLUCOSE SERPL-MCNC: 127 MG/DL (ref 74–99)
HBA1C MFR BLD: 7.7 % (ref 4.2–5.6)
HDLC SERPL-MCNC: 40 MG/DL (ref 40–60)
HDLC SERPL: 4.4 {RATIO} (ref 0–5)
LDLC SERPL CALC-MCNC: 104.6 MG/DL (ref 0–100)
LIPID PROFILE,FLP: ABNORMAL
MICROALBUMIN UR-MCNC: 60.3 MG/DL (ref 0–3)
MICROALBUMIN/CREAT UR-RTO: 157 MG/G (ref 0–30)
POTASSIUM SERPL-SCNC: 3.9 MMOL/L (ref 3.5–5.5)
PROT SERPL-MCNC: 8.4 G/DL (ref 6.4–8.2)
SODIUM SERPL-SCNC: 138 MMOL/L (ref 136–145)
TRIGL SERPL-MCNC: 147 MG/DL (ref ?–150)
VLDLC SERPL CALC-MCNC: 29.4 MG/DL

## 2019-08-01 PROCEDURE — 83036 HEMOGLOBIN GLYCOSYLATED A1C: CPT

## 2019-08-01 PROCEDURE — 82043 UR ALBUMIN QUANTITATIVE: CPT

## 2019-08-01 PROCEDURE — 80053 COMPREHEN METABOLIC PANEL: CPT

## 2019-08-01 PROCEDURE — 36415 COLL VENOUS BLD VENIPUNCTURE: CPT

## 2019-08-01 PROCEDURE — 80061 LIPID PANEL: CPT

## 2019-08-01 PROCEDURE — G0480 DRUG TEST DEF 1-7 CLASSES: HCPCS

## 2019-08-01 RX ORDER — MELOXICAM 15 MG/1
15 TABLET ORAL DAILY
Qty: 30 TAB | Refills: 5 | Status: SHIPPED | OUTPATIENT
Start: 2019-08-01 | End: 2020-10-16

## 2019-08-01 RX ORDER — HYDROCODONE BITARTRATE AND ACETAMINOPHEN 5; 325 MG/1; MG/1
1 TABLET ORAL
Qty: 120 TAB | Refills: 0 | Status: SHIPPED | OUTPATIENT
Start: 2019-08-01 | End: 2019-12-10 | Stop reason: SDUPTHER

## 2019-08-01 RX ORDER — ALBUTEROL SULFATE 90 UG/1
2 AEROSOL, METERED RESPIRATORY (INHALATION)
Qty: 1 INHALER | Refills: 5 | Status: SHIPPED | OUTPATIENT
Start: 2019-08-01 | End: 2021-02-24

## 2019-08-01 RX ORDER — ALBUTEROL SULFATE 0.83 MG/ML
2.5 SOLUTION RESPIRATORY (INHALATION)
Qty: 100 EACH | Refills: 5 | Status: SHIPPED | OUTPATIENT
Start: 2019-08-01 | End: 2022-10-20 | Stop reason: SDUPTHER

## 2019-08-01 RX ORDER — EPINEPHRINE 0.3 MG/.3ML
0.3 INJECTION SUBCUTANEOUS
Qty: 1 SYRINGE | Refills: 11 | Status: SHIPPED | OUTPATIENT
Start: 2019-08-01 | End: 2019-08-01

## 2019-08-01 RX ORDER — FLUTICASONE PROPIONATE 50 MCG
2 SPRAY, SUSPENSION (ML) NASAL DAILY
Qty: 1 BOTTLE | Refills: 5 | Status: SHIPPED | OUTPATIENT
Start: 2019-08-01 | End: 2019-12-10 | Stop reason: SDUPTHER

## 2019-08-01 RX ORDER — FLUTICASONE PROPIONATE 110 UG/1
1 AEROSOL, METERED RESPIRATORY (INHALATION) EVERY 12 HOURS
Qty: 1 INHALER | Refills: 5 | Status: SHIPPED | OUTPATIENT
Start: 2019-08-01 | End: 2020-08-12

## 2019-08-01 NOTE — PROGRESS NOTES
ROOM # 6    Jason Perez presents today for   Chief Complaint   Patient presents with    Medication Refill       Jason Perez preferred language for health care discussion is english/other. Is someone accompanying this pt? no    Is the patient using any DME equipment during OV? no    Depression Screening:  3 most recent [facility] Screens 6/25/2018 2/14/2018 11/21/2017 6/29/2017 1/30/2017 8/25/2016 6/13/2016   PHQ Not Done - - - Active Diagnosis of Depression or Bipolar Disorder Active Diagnosis of Depression or Bipolar Disorder - -   Little interest or pleasure in doing things Several days Not at all Not at all Not at all - Not at all Not at all   Feeling down, depressed, irritable, or hopeless Several days Not at all Not at all More than half the days - Not at all Not at all   Total Score PHQ 2 2 0 0 2 - 0 0       Learning Assessment:  Learning Assessment 4/27/2015   PRIMARY LEARNER Patient   HIGHEST LEVEL OF EDUCATION - PRIMARY LEARNER  DID NOT GRADUATE HIGH SCHOOL   BARRIERS PRIMARY LEARNER NONE   CO-LEARNER CAREGIVER No   PRIMARY LANGUAGE ENGLISH   LEARNER PREFERENCE PRIMARY READING   ANSWERED BY patient   RELATIONSHIP SELF       Abuse Screening:  Abuse Screening Questionnaire 6/25/2018 4/27/2015   Do you ever feel afraid of your partner? N N   Are you in a relationship with someone who physically or mentally threatens you? N N   Is it safe for you to go home? Y Y       Fall Risk  No flowsheet data found. Health Maintenance reviewed and discussed per provider. Yes    Jason Perez is due for   Health Maintenance Due   Topic Date Due    Pneumococcal 0-64 years (1 of 1 - PPSV23) 04/22/1996    EYE EXAM RETINAL OR DILATED  04/22/2000    HEMOGLOBIN A1C Q6M  12/25/2018    FOOT EXAM Q1  06/25/2019    MICROALBUMIN Q1  06/25/2019    LIPID PANEL Q1  06/25/2019    MEDICARE YEARLY EXAM  06/26/2019    Influenza Age 9 to Adult  08/01/2019     Please order/place referral if appropriate.       Advance Directive:  1. Do you have an advance directive in place? Patient Reply: no    2. If not, would you like material regarding how to put one in place? Patient Reply: no    Coordination of Care:  1. Have you been to the ER, urgent care clinic since your last visit? Hospitalized since your last visit? yes    2. Have you seen or consulted any other health care providers outside of the [address] [place] [address] since your last visit? Include any pap smears or colon screening.  no

## 2019-08-01 NOTE — PROGRESS NOTES
HISTORY OF PRESENT ILLNESS  Jason Perez is a 34 y.o. female. Visit Vitals  /89 (BP 1 Location: Right arm, BP Patient Position: Sitting)   Pulse 85   Temp 97.4 °F (36.3 °C) (Oral)   Resp 20   Ht 5' 1\" (1.549 m)   Wt 253 lb (114.8 kg)   SpO2 98%   BMI 47.80 kg/m²       Has actually only been taking once a day metformin, not BID as she says she had not been very compliant. On the higher dose her BS sometimes gets low. Diabetes   The history is provided by the patient. This is a chronic problem. The current episode started more than 1 week ago. The problem occurs daily. The problem has not changed since onset. Exacerbated by: diet. The symptoms are relieved by medications (diet). Treatments tried: see med list.       Review of Systems   Constitutional: Negative. Eyes: Negative for blurred vision. Genitourinary: Negative for frequency and urgency. Musculoskeletal: Positive for back pain. Endo/Heme/Allergies: Negative for polydipsia. Physical Exam   Constitutional: She is oriented to person, place, and time. She appears well-developed and well-nourished. No distress. Cardiovascular: Normal rate and regular rhythm. Pulmonary/Chest: Effort normal and breath sounds normal.   Musculoskeletal: She exhibits no edema. Neurological: She is alert and oriented to person, place, and time. Skin: Skin is warm and dry. She is not diaphoretic. Psychiatric: She has a normal mood and affect. Nursing note and vitals reviewed. ASSESSMENT and PLAN    ICD-10-CM ICD-9-CM    1. Controlled type 2 diabetes mellitus without complication, without long-term current use of insulin ([facility]) C34.3 640.70 METABOLIC PANEL, COMPREHENSIVE      MICROALBUMIN, UR, RAND W/ MICROALB/CREAT RATIO      LIPID PANEL      HEMOGLOBIN A1C W/O EAG   2. Obstructive sleep apnea syndrome G47.33 327.23    3.  Chronic low back pain without sciatica, unspecified back pain laterality M54.5 724.2 HYDROcodone-acetaminophen (NORCO) 5-325 mg per tablet    G89.29 338.29    4. Long term (current) use of opiate analgesic Z79.891 V58.69 HYDROcodone-acetaminophen (NORCO) 5-325 mg per tablet      TOXASSURE SELECT 13 (MW)   5. Medication refill Z76.0 V68.1 albuterol (PROVENTIL HFA, VENTOLIN HFA, PROAIR HFA) 90 mcg/actuation inhaler      albuterol (PROVENTIL VENTOLIN) 2.5 mg /3 mL (0.083 %) nebu      EPINEPHrine (EPIPEN) 0.3 mg/0.3 mL injection      fluticasone propionate (FLONASE) 50 mcg/actuation nasal spray      fluticasone propionate (FLOVENT HFA) 110 mcg/actuation inhaler      HYDROcodone-acetaminophen (NORCO) 5-325 mg per tablet   6. Obesity, morbid ([facility] E66.01 278.01        She is asking for some norco today--she has done well and stayed off a few weeks. But her back is flaring  Will try meloxicam first.  Update UDS    Update all lab today    Refills as noted    Discussed BMI/weight, lifestyle, diet and exercise. Discussed effect on blood pressure, blood sugar, and joints especially  Focus on limiting white carbs, portion control, and moving more.       F/u as appointed 8/23/19

## 2019-08-06 LAB — DRUGS UR: NORMAL

## 2019-09-24 ENCOUNTER — HOSPITAL ENCOUNTER (EMERGENCY)
Age: 29
Discharge: HOME | End: 2019-09-25
Attending: EMERGENCY MEDICINE | Admitting: EMERGENCY MEDICINE
Payer: MEDICARE

## 2019-09-24 DIAGNOSIS — R73.9 HYPERGLYCEMIA: ICD-10-CM

## 2019-09-24 DIAGNOSIS — N39.0 URINARY TRACT INFECTION WITHOUT HEMATURIA, SITE UNSPECIFIED: Primary | ICD-10-CM

## 2019-09-24 PROCEDURE — 81001 URINALYSIS AUTO W/SCOPE: CPT

## 2019-09-24 PROCEDURE — 81003 URINALYSIS AUTO W/O SCOPE: CPT

## 2019-09-24 PROCEDURE — 87491 CHLMYD TRACH DNA AMP PROBE: CPT

## 2019-09-24 PROCEDURE — 99284 EMERGENCY DEPT VISIT MOD MDM: CPT

## 2019-09-24 PROCEDURE — 81025 URINE PREGNANCY TEST: CPT

## 2019-09-24 PROCEDURE — 87210 SMEAR WET MOUNT SALINE/INK: CPT

## 2019-09-25 VITALS
SYSTOLIC BLOOD PRESSURE: 141 MMHG | OXYGEN SATURATION: 100 % | DIASTOLIC BLOOD PRESSURE: 92 MMHG | TEMPERATURE: 98.9 F | WEIGHT: 258 LBS | HEART RATE: 89 BPM | HEIGHT: 61 IN | BODY MASS INDEX: 48.71 KG/M2 | RESPIRATION RATE: 16 BRPM

## 2019-09-25 LAB
APPEARANCE UR: CLEAR
BACTERIA URNS QL MICRO: ABNORMAL /HPF
BILIRUB UR QL: NEGATIVE
C TRACH RRNA SPEC QL NAA+PROBE: NEGATIVE
COLOR UR: YELLOW
EPITH CASTS URNS QL MICRO: ABNORMAL /LPF (ref 0–5)
GLUCOSE BLD STRIP.AUTO-MCNC: 204 MG/DL (ref 70–110)
GLUCOSE UR STRIP.AUTO-MCNC: 500 MG/DL
HCG UR QL: NEGATIVE
HGB UR QL STRIP: ABNORMAL
KETONES UR QL STRIP.AUTO: NEGATIVE MG/DL
LEUKOCYTE ESTERASE UR QL STRIP.AUTO: ABNORMAL
MUCOUS THREADS URNS QL MICRO: ABNORMAL /LPF
N GONORRHOEA RRNA SPEC QL NAA+PROBE: NEGATIVE
NITRITE UR QL STRIP.AUTO: NEGATIVE
PH UR STRIP: 5.5 [PH] (ref 5–8)
PROT UR STRIP-MCNC: 100 MG/DL
RBC #/AREA URNS HPF: ABNORMAL /HPF (ref 0–5)
SERVICE CMNT-IMP: NORMAL
SP GR UR REFRACTOMETRY: >1.03 (ref 1–1.03)
SPECIMEN SOURCE: NORMAL
UROBILINOGEN UR QL STRIP.AUTO: 1 EU/DL (ref 0.2–1)
WBC URNS QL MICRO: ABNORMAL /HPF (ref 0–4)
WET PREP GENITAL: NORMAL
YEAST URNS QL MICRO: ABNORMAL

## 2019-09-25 PROCEDURE — 82962 GLUCOSE BLOOD TEST: CPT

## 2019-09-25 RX ORDER — FLUCONAZOLE 150 MG/1
150 TABLET ORAL
Qty: 1 TAB | Refills: 0 | Status: SHIPPED | OUTPATIENT
Start: 2019-09-25 | End: 2019-09-25

## 2019-09-25 RX ORDER — CEPHALEXIN 500 MG/1
1000 CAPSULE ORAL 2 TIMES DAILY
Qty: 20 CAP | Refills: 0 | Status: SHIPPED | OUTPATIENT
Start: 2019-09-25 | End: 2019-09-30

## 2019-09-25 NOTE — DISCHARGE INSTRUCTIONS

## 2019-09-25 NOTE — ED PROVIDER NOTES
[facility] EMERGENCY DEPT      Date: 9/24/2019  Patient Name: Clemencia Cool    History of Presenting Illness     Chief Complaint   Patient presents with    Vaginal Discharge    Laceration       34 y.o. female with a past medical history of morbid obesity ED and non-insulin-dependent diabetes presents the ED complaining of vaginal irritation and abrasion. Patient states she has had irritation and dysuria for the past 3 days. States today she was scratching when she accidentally cut her external vagina. She notes having a slight clearish white discharge. States this feels consistent with prior yeast infections. Patient states her blood sugar today was 240. She denies any fever, chills, abdominal pain or pelvic pain, back pain, other symptoms at this time. Patient denies any other associated signs or symptoms. Patient denies any other complaints. Nursing notes regarding the HPI and triage nursing notes were reviewed. Prior medical records were reviewed. Current Outpatient Medications   Medication Sig Dispense Refill    cephALEXin (KEFLEX) 500 mg capsule Take 2 Caps by mouth two (2) times a day for 5 days. 20 Cap 0    fluconazole (DIFLUCAN) 150 mg tablet Take 1 Tab by mouth now for 1 dose. 1 Tab 0    albuterol (PROVENTIL HFA, VENTOLIN HFA, PROAIR HFA) 90 mcg/actuation inhaler Take 2 Puffs by inhalation every six (6) hours as needed for Wheezing. 1 Inhaler 5    albuterol (PROVENTIL VENTOLIN) 2.5 mg /3 mL (0.083 %) nebu 3 mL by Nebulization route three (3) times daily as needed (wheezing). Indications: bronchospasm 100 Each 5    fluticasone propionate (FLONASE) 50 mcg/actuation nasal spray 2 Sprays by Both Nostrils route daily. 1 Bottle 5    fluticasone propionate (FLOVENT HFA) 110 mcg/actuation inhaler Take 1 Puff by inhalation every twelve (12) hours. 1 Inhaler 5    meloxicam (MOBIC) 15 mg tablet Take 1 Tab by mouth daily.  30 Tab 5    guaiFENesin-codeine (CHERATUSSIN AC) 100-10 mg/5 mL solution Take 5 mL by mouth three (3) times daily as needed for Cough. Max Daily Amount: 15 mL. 118 mL 0    metFORMIN (GLUCOPHAGE) 1,000 mg tablet Take 1 Tab by mouth two (2) times daily (with meals). 180 Tab 3    famotidine (PEPCID) 20 mg tablet Take 1 Tab by mouth daily. (Patient taking differently: Take 20 mg by mouth as needed.) 90 Tab 5    diphenhydrAMINE (BENADRYL) 25 mg capsule 25 mg. Past History     Past Medical History:  Past Medical History:   Diagnosis Date    ASCUS (atypical squamous cells of undetermined significance) on Pap smear 3/11    Asthma     [facility] disorder [facility]     Chlamydia      \"    Chronic back pain     Depression     Diabetes mellitus ([facility] 01/02/2017    Elevated blood sugar     GC (gonococcus infection) 2007/ 2004    HPV (human papilloma virus) infection 3/11    Irregular menses     Schizophrenia ([facility]     Sleep apnea 2/14    Smoker     Suicide attempt ([facility] 5/08    with tylenol    Suicide attempt ([facility] 10/09    Trichomonal vaginitis 8/10    Uterine fibroid        Past Surgical History:  No past surgical history on file. Family History:  Family History   Problem Relation Age of Onset    Attention Deficit Hyperactivity Disorder Brother     Bipolar Disorder Brother     Seizures Sister     Hypertension Sister     Other Sister         substance abuse       Social History:  Social History     Tobacco Use    Smoking status: Current Some Day Smoker     Packs/day: 0.25     Years: 9.00     Pack years: 2.25    Smokeless tobacco: Never Used    Tobacco comment: unable/unwilling to quit at this time   Substance Use Topics    Alcohol use: No    Drug use: No       Allergies: Allergies   Allergen Reactions    Other Food Anaphylaxis     [place], guacamole    Argan Kernal Oil (Arganina Spinosa) Hives    Naproxen Swelling    Percocet [Oxycodone-Acetaminophen] Itching       Patient's primary care provider (as noted in EPIC):   Jami Vera, MD    Review of Systems   Constitutional:  Denies malaise, fever, chills. GI/ABD:  Denies injury, pain, distention, nausea, vomiting, diarrhea. :  + vaginal irritation, abrasion, dysuria. Back:  Denies injury or pain. Pelvis:  Denies injury or pain. Skin: Denies injury, rash, itching or skin changes. All other systems negative as reviewed. Visit Vitals  BP (!) 141/92   Pulse 89   Temp 98.9 °F (37.2 °C)   Resp 16   Ht 5' 1\" (1.549 m)   Wt 117 kg (258 lb)   SpO2 100%   BMI 48.75 kg/m²       PHYSICAL EXAM:    CONSTITUTIONAL:  Alert, in no apparent distress; morbidly obese. HEAD:  Normocephalic, atraumatic. EYES:  EOMI. Non-icteric sclera. Normal conjunctiva. ENTM:  Mouth:  mucous membranes moist.  NECK:  Supple  RESPIRATORY:  Chest clear, equal breath sounds, good air movement. Without wheezes, rhonchi, rales. CARDIOVASCULAR:  Regular rate and rhythm. No murmurs, rubs, or gallops. GI:  Normal bowel sounds, abdomen soft and non-tender. No rebound or guarding. : Labia minora left mid aspect with minimal abrasion present, no discharge noted, no other lesions noted. BACK:  Non-tender. No CVAT. NEURO:  Moves all four extremities, and grossly normal motor exam.  SKIN:  No rashes;  Normal for age. PSYCH:  Alert and normal affect. ED COURSE:      DDX: STD, BV, Candida, UTI, abrasion, laceration.     Recent Results (from the past 12 hour(s))   WET PREP    Collection Time: 09/24/19 11:29 PM   Result Value Ref Range    Special Requests: NO SPECIAL REQUESTS      Wet prep NO YEAST,TRICHOMONAS OR CLUE CELLS NOTED     URINALYSIS W/ RFLX MICROSCOPIC    Collection Time: 09/24/19 11:37 PM   Result Value Ref Range    Color YELLOW      Appearance CLEAR      Specific gravity >1.030 (H) 1.005 - 1.030    pH (UA) 5.5 5.0 - 8.0      Protein 100 (A) NEG mg/dL    Glucose 500 (A) NEG mg/dL    Ketone NEGATIVE  NEG mg/dL    Bilirubin NEGATIVE  NEG      Blood MODERATE (A) NEG      Urobilinogen 1.0 0.2 - 1.0 EU/dL    Nitrites NEGATIVE  NEG      Leukocyte Esterase SMALL (A) NEG     HCG URINE, QL    Collection Time: 09/24/19 11:37 PM   Result Value Ref Range    HCG urine, QL NEGATIVE  NEG     URINE MICROSCOPIC ONLY    Collection Time: 09/24/19 11:37 PM   Result Value Ref Range    WBC 11 to 20 0 - 4 /hpf    RBC 11 to 20 0 - 5 /hpf    Epithelial cells FEW 0 - 5 /lpf    Bacteria 1+ (A) NEG /hpf    Mucus FEW (A) NEG /lpf    Yeast FEW (A) NEG     GLUCOSE, POC    Collection Time: 09/25/19 12:36 AM   Result Value Ref Range    Glucose (POC) 204 (H) 70 - 110 mg/dL      IMPRESSION AND MEDICAL DECISION MAKING:  Based upon the patient's presentation with noted HPI and PE, along with the work up done in the emergency department, I believe that the patient is having an urinary tract infection. Will treat with Keflex. Patient does not have any yeast on her wet prep, however, states this feels typical of prior yeast infections. She has yeast in her urine. She states she also gets yeast infections after taking antibiotics. I have written for a Diflucan for home for after she completes her Keflex prescription. Patient is to follow-up with her regular doctor. BS was 204. Diagnosis:   1. Urinary tract infection without hematuria, site unspecified    2. Hyperglycemia      Disposition: Discharge    Follow-up Information     Follow up With Specialties Details Why Contact Info    Jami Vera MD Internal Medicine In 3 days  [address] [address] [address] [place] [address] [phone]      [facility] EMERGENCY DEPT Emergency Medicine  If symptoms worsen [address] [facility]  [phone]          Patient's Medications   Start Taking    CEPHALEXIN (KEFLEX) 500 MG CAPSULE    Take 2 Caps by mouth two (2) times a day for 5 days. FLUCONAZOLE (DIFLUCAN) 150 MG TABLET    Take 1 Tab by mouth now for 1 dose.    Continue Taking    ALBUTEROL (PROVENTIL HFA, VENTOLIN HFA, PROAIR HFA) 90 MCG/ACTUATION INHALER    Take 2 Puffs by inhalation every six (6) hours as needed for Wheezing. ALBUTEROL (PROVENTIL VENTOLIN) 2.5 MG /3 ML (0.083 %) NEBU    3 mL by Nebulization route three (3) times daily as needed (wheezing). Indications: bronchospasm    DIPHENHYDRAMINE (BENADRYL) 25 MG CAPSULE    25 mg.    FAMOTIDINE (PEPCID) 20 MG TABLET    Take 1 Tab by mouth daily. FLUTICASONE PROPIONATE (FLONASE) 50 MCG/ACTUATION NASAL SPRAY    2 Sprays by Both Nostrils route daily. FLUTICASONE PROPIONATE (FLOVENT HFA) 110 MCG/ACTUATION INHALER    Take 1 Puff by inhalation every twelve (12) hours. GUAIFENESIN-CODEINE (CHERATUSSIN AC) 100-10 MG/5 ML SOLUTION    Take 5 mL by mouth three (3) times daily as needed for Cough. Max Daily Amount: 15 mL. MELOXICAM (MOBIC) 15 MG TABLET    Take 1 Tab by mouth daily. METFORMIN (GLUCOPHAGE) 1,000 MG TABLET    Take 1 Tab by mouth two (2) times daily (with meals).    These Medications have changed    No medications on file   Stop Taking    No medications on file     CORY Tee

## 2019-09-25 NOTE — ED NOTES
2:47 AM  09/25/19     Discharge instructions given to patient (name) with verbalization of understanding. Patient accompanied by self. Patient discharged with the following prescriptions Keflex, Diflucan. Patient discharged to home (destination).       Jen Barroso RN

## 2019-09-25 NOTE — ED TRIAGE NOTES
Patient states \"yeast infection\" for the last two days. Patient also states she accidentally \"scratched some skin off my vagina\" about 30mins ago and was concerned about using topical yeast cream on top of it.

## 2019-10-22 DIAGNOSIS — Z76.0 MEDICATION REFILL: ICD-10-CM

## 2019-10-22 RX ORDER — METFORMIN HYDROCHLORIDE 1000 MG/1
TABLET ORAL
Qty: 180 TAB | Refills: 3 | Status: ON HOLD | OUTPATIENT
Start: 2019-10-22 | End: 2020-10-20 | Stop reason: SDUPTHER

## 2019-12-10 ENCOUNTER — OFFICE VISIT (OUTPATIENT)
Dept: INTERNAL MEDICINE CLINIC | Age: 29
End: 2019-12-10

## 2019-12-10 VITALS
HEART RATE: 91 BPM | OXYGEN SATURATION: 99 % | BODY MASS INDEX: 47.2 KG/M2 | WEIGHT: 250 LBS | HEIGHT: 61 IN | TEMPERATURE: 98.7 F | SYSTOLIC BLOOD PRESSURE: 121 MMHG | RESPIRATION RATE: 20 BRPM | DIASTOLIC BLOOD PRESSURE: 82 MMHG

## 2019-12-10 DIAGNOSIS — Z76.0 MEDICATION REFILL: ICD-10-CM

## 2019-12-10 DIAGNOSIS — M54.50 CHRONIC LOW BACK PAIN WITHOUT SCIATICA, UNSPECIFIED BACK PAIN LATERALITY: ICD-10-CM

## 2019-12-10 DIAGNOSIS — N92.6 IRREGULAR MENSES: ICD-10-CM

## 2019-12-10 DIAGNOSIS — E66.01 OBESITY, MORBID (HCC): ICD-10-CM

## 2019-12-10 DIAGNOSIS — Z79.891 LONG TERM (CURRENT) USE OF OPIATE ANALGESIC: ICD-10-CM

## 2019-12-10 DIAGNOSIS — R09.81 NASAL CONGESTION: ICD-10-CM

## 2019-12-10 DIAGNOSIS — G89.29 CHRONIC LOW BACK PAIN WITHOUT SCIATICA, UNSPECIFIED BACK PAIN LATERALITY: ICD-10-CM

## 2019-12-10 DIAGNOSIS — E11.9 CONTROLLED TYPE 2 DIABETES MELLITUS WITHOUT COMPLICATION, WITHOUT LONG-TERM CURRENT USE OF INSULIN (HCC): Primary | Chronic | ICD-10-CM

## 2019-12-10 DIAGNOSIS — R09.89 CHEST CONGESTION: ICD-10-CM

## 2019-12-10 DIAGNOSIS — F31.9 BIPOLAR DISORDER (HCC): ICD-10-CM

## 2019-12-10 RX ORDER — FLUTICASONE PROPIONATE 50 MCG
2 SPRAY, SUSPENSION (ML) NASAL DAILY
Qty: 1 BOTTLE | Refills: 5 | Status: SHIPPED | OUTPATIENT
Start: 2019-12-10 | End: 2021-09-20 | Stop reason: SDUPTHER

## 2019-12-10 RX ORDER — HYDROCODONE BITARTRATE AND ACETAMINOPHEN 5; 325 MG/1; MG/1
1 TABLET ORAL
Qty: 120 TAB | Refills: 0 | Status: SHIPPED | OUTPATIENT
Start: 2019-12-10 | End: 2020-01-10 | Stop reason: SDUPTHER

## 2019-12-10 RX ORDER — BUPROPION HYDROCHLORIDE 100 MG/1
TABLET ORAL
Status: CANCELLED | OUTPATIENT
Start: 2019-12-10

## 2019-12-10 RX ORDER — BUPROPION HYDROCHLORIDE 150 MG/1
150 TABLET, EXTENDED RELEASE ORAL 2 TIMES DAILY
Qty: 60 TAB | Refills: 5 | Status: SHIPPED | OUTPATIENT
Start: 2019-12-10 | End: 2021-08-17

## 2019-12-10 RX ORDER — LORATADINE 10 MG
10 TABLET,DISINTEGRATING ORAL DAILY
Qty: 30 TAB | Refills: 5 | Status: SHIPPED | OUTPATIENT
Start: 2019-12-10 | End: 2022-02-17 | Stop reason: SDUPTHER

## 2019-12-10 NOTE — PROGRESS NOTES
ROOM # 4    Keiko Charlton presents today for   Chief Complaint   Patient presents with    Medication Refill       Keiko Charlton preferred language for health care discussion is english/other. Is someone accompanying this pt? no    Is the patient using any DME equipment during OV? no    Depression Screening:  3 most recent [facility] Screens 6/25/2018 2/14/2018 11/21/2017 6/29/2017 1/30/2017 8/25/2016 6/13/2016   PHQ Not Done - - - Active Diagnosis of Depression or Bipolar Disorder Active Diagnosis of Depression or Bipolar Disorder - -   Little interest or pleasure in doing things Several days Not at all Not at all Not at all - Not at all Not at all   Feeling down, depressed, irritable, or hopeless Several days Not at all Not at all More than half the days - Not at all Not at all   Total Score PHQ 2 2 0 0 2 - 0 0       Learning Assessment:  Learning Assessment 4/27/2015   PRIMARY LEARNER Patient   HIGHEST LEVEL OF EDUCATION - PRIMARY LEARNER  DID NOT GRADUATE HIGH SCHOOL   BARRIERS PRIMARY LEARNER NONE   CO-LEARNER CAREGIVER No   PRIMARY LANGUAGE ENGLISH   LEARNER PREFERENCE PRIMARY READING   ANSWERED BY patient   RELATIONSHIP SELF       Abuse Screening:  Abuse Screening Questionnaire 6/25/2018 4/27/2015   Do you ever feel afraid of your partner? N N   Are you in a relationship with someone who physically or mentally threatens you? N N   Is it safe for you to go home? Y Y       Fall Risk  No flowsheet data found. Health Maintenance reviewed and discussed per provider. Yes    Keiko Charlton is due for   Health Maintenance Due   Topic Date Due    Pneumococcal 0-64 years (1 of 1 - PPSV23) 04/22/1996    EYE EXAM RETINAL OR DILATED  04/22/2000    FOOT EXAM Q1  06/25/2019    MEDICARE YEARLY EXAM  06/26/2019    Influenza Age 9 to Adult  08/01/2019     Please order/place referral if appropriate. Advance Directive:  1. Do you have an advance directive in place? Patient Reply: no    2.  If not, would you like material regarding how to put one in place? Patient Reply: no    Coordination of Care:  1. Have you been to the ER, urgent care clinic since your last visit? Hospitalized since your last visit? yes    2. Have you seen or consulted any other health care providers outside of the [address] [place] [address] since your last visit? Include any pap smears or colon screening.  no

## 2019-12-10 NOTE — PROGRESS NOTES
HISTORY OF PRESENT ILLNESS  Domo Jenkins is a 34 y.o. female. Visit Vitals  /82   Pulse 91   Temp 98.7 °F (37.1 °C)   Resp 20   Ht 5' 1\" (1.549 m)   Wt 250 lb (113.4 kg)   SpO2 99%   BMI 47.24 kg/m²       Wt down 8#  But she wants to lose more. She would like to go back on wellbutrin which also helped her stress last time. Current Outpatient Medications:  fluticasone propionate (FLONASE) 50 mcg/actuation nasal spray, 2 Sprays by Both Nostrils route daily. HYDROcodone-acetaminophen (NORCO) 5-325 mg per tablet, Take 1 Tab by mouth every six (6) hours as needed for Pain for up to 30 days. Max Daily Amount: 4 Tabs. Indications: pain  buPROPion SR (WELLBUTRIN SR) 150 mg SR tablet, Take 1 Tab by mouth two (2) times a day. loratadine (CLARITIN REDITABS) 10 mg dissolvable tablet, Take 1 Tab by mouth daily. metFORMIN (GLUCOPHAGE) 1,000 mg tablet, take 1 tablet by mouth twice a day with food  albuterol (PROVENTIL HFA, VENTOLIN HFA, PROAIR HFA) 90 mcg/actuation inhaler, Take 2 Puffs by inhalation every six (6) hours as needed for Wheezing. albuterol (PROVENTIL VENTOLIN) 2.5 mg /3 mL (0.083 %) nebu, 3 mL by Nebulization route three (3) times daily as needed (wheezing). Indications: bronchospasm  fluticasone propionate (FLOVENT HFA) 110 mcg/actuation inhaler, Take 1 Puff by inhalation every twelve (12) hours. meloxicam (MOBIC) 15 mg tablet, Take 1 Tab by mouth daily. famotidine (PEPCID) 20 mg tablet, Take 1 Tab by mouth daily. (Patient taking differently: Take 20 mg by mouth as needed.)  diphenhydrAMINE (BENADRYL) 25 mg capsule, 25 mg.            Medication Refill   The history is provided by the patient. This is a new problem. Back Pain    The history is provided by the patient. This is a chronic problem. The current episode started more than 1 week ago. The problem has not changed since onset. The problem occurs daily. Patient reports not work related injury. The quality of the pain is described as aching and similar to previous episodes. The symptoms are aggravated by bending, twisting and certain positions. Pertinent negatives include no fever, no bowel incontinence, no perianal numbness, no bladder incontinence and no paresthesias. She has tried NSAIDs, analgesics, bed rest and muscle relaxants for the symptoms. The treatment provided moderate relief. Risk factors include lack of exercise, a sedentary lifestyle, poor posture and obesity. Diabetes   The history is provided by the patient. The current episode started more than 1 week ago. The problem occurs daily. The problem has not changed since onset. Exacerbated by: diet. The symptoms are relieved by medications (diet). Treatments tried: see med list.       Review of Systems   Constitutional: Negative for fever. HENT: Positive for congestion. Negative for sore throat. Respiratory: Positive for sputum production. Producing \"a lot of phlegm. \" She is not sure if it is from her chest or from sinus drainage  Sometimes her chest feels congested. She uses flovent and albuterol inhalers now. Clear phlegm   Gastrointestinal: Negative for bowel incontinence. Genitourinary: Negative for bladder incontinence. Musculoskeletal: Positive for back pain. Neurological: Negative for paresthesias. Physical Exam  Vitals signs and nursing note reviewed. Constitutional:       General: She is not in acute distress. Appearance: She is well-developed. She is not diaphoretic. Cardiovascular:      Rate and Rhythm: Normal rate and regular rhythm. Pulmonary:      Effort: Pulmonary effort is normal.      Breath sounds: Normal breath sounds. No rhonchi. Skin:     General: Skin is warm and dry. Neurological:      Mental Status: She is alert and oriented to person, place, and time. ASSESSMENT and PLAN    ICD-10-CM ICD-9-CM    1.  Controlled type 2 diabetes mellitus without complication, without long-term current use of insulin ([facility]) E11.9 688.60 METABOLIC PANEL, COMPREHENSIVE      LIPID PANEL      HEMOGLOBIN A1C W/O EAG   2. Chronic low back pain without sciatica, unspecified back pain laterality M54.5 724.2 HYDROcodone-acetaminophen (NORCO) 5-325 mg per tablet    G89.29 338.29    3. Chest congestion R09.89 786.9 loratadine (CLARITIN REDITABS) 10 mg dissolvable tablet   4. Nasal congestion R09.81 478.19 loratadine (CLARITIN REDITABS) 10 mg dissolvable tablet   5. Bipolar disorder ([facility] F31.9 296.80 buPROPion SR (WELLBUTRIN SR) 150 mg SR tablet   6. Irregular menses N92.6 626.4 TSH AND FREE T4   7. Obesity, morbid ([facility] E66.01 278.01    8. Medication refill Z76.0 V68.1 fluticasone propionate (FLONASE) 50 mcg/actuation nasal spray      HYDROcodone-acetaminophen (NORCO) 5-325 mg per tablet      buPROPion SR (WELLBUTRIN SR) 150 mg SR tablet   9. Long term (current) use of opiate analgesic Z79.891 V58.69 HYDROcodone-acetaminophen (NORCO) 5-325 mg per tablet       update lab when fasting    Add claritin for her sinus and chest congestion  Continue the other inhalers and flonase  There is no indication of infection. Obesity. Restart wellbutrin    Irregular/absent menses--check thyroid.  Had appt with gyn coming up    Refills as noted    F/u as appt next week for [facility]

## 2019-12-11 ENCOUNTER — HOSPITAL ENCOUNTER (OUTPATIENT)
Dept: LAB | Age: 29
Discharge: HOME | End: 2019-12-11
Payer: MEDICARE

## 2019-12-11 DIAGNOSIS — E11.9 CONTROLLED TYPE 2 DIABETES MELLITUS WITHOUT COMPLICATION, WITHOUT LONG-TERM CURRENT USE OF INSULIN (HCC): Chronic | ICD-10-CM

## 2019-12-11 DIAGNOSIS — N92.6 IRREGULAR MENSES: ICD-10-CM

## 2019-12-11 LAB
ALBUMIN SERPL-MCNC: 3.4 G/DL (ref 3.4–5)
ALBUMIN/GLOB SERPL: 0.7 {RATIO} (ref 0.8–1.7)
ALP SERPL-CCNC: 133 U/L (ref 45–117)
ALT SERPL-CCNC: 27 U/L (ref 13–56)
ANION GAP SERPL CALC-SCNC: 5 MMOL/L (ref 3–18)
AST SERPL-CCNC: 13 U/L (ref 10–38)
BILIRUB SERPL-MCNC: 0.2 MG/DL (ref 0.2–1)
BUN SERPL-MCNC: 9 MG/DL (ref 7–18)
BUN/CREAT SERPL: 9 (ref 12–20)
CALCIUM SERPL-MCNC: 9.2 MG/DL (ref 8.5–10.1)
CHLORIDE SERPL-SCNC: 103 MMOL/L (ref 100–111)
CO2 SERPL-SCNC: 30 MMOL/L (ref 21–32)
CREAT SERPL-MCNC: 1.03 MG/DL (ref 0.6–1.3)
GLOBULIN SER CALC-MCNC: 4.7 G/DL (ref 2–4)
GLUCOSE SERPL-MCNC: 131 MG/DL (ref 74–99)
HBA1C MFR BLD: 8.9 % (ref 4.2–5.6)
POTASSIUM SERPL-SCNC: 4 MMOL/L (ref 3.5–5.5)
PROT SERPL-MCNC: 8.1 G/DL (ref 6.4–8.2)
SODIUM SERPL-SCNC: 138 MMOL/L (ref 136–145)

## 2019-12-11 PROCEDURE — 80061 LIPID PANEL: CPT

## 2019-12-11 PROCEDURE — 83036 HEMOGLOBIN GLYCOSYLATED A1C: CPT

## 2019-12-11 PROCEDURE — 80053 COMPREHEN METABOLIC PANEL: CPT

## 2019-12-11 PROCEDURE — 36415 COLL VENOUS BLD VENIPUNCTURE: CPT

## 2019-12-11 PROCEDURE — 84439 ASSAY OF FREE THYROXINE: CPT

## 2019-12-12 LAB
CHOLEST SERPL-MCNC: 172 MG/DL
HDLC SERPL-MCNC: 44 MG/DL (ref 40–60)
HDLC SERPL: 3.9 {RATIO} (ref 0–5)
LDLC SERPL CALC-MCNC: 106 MG/DL (ref 0–100)
LIPID PROFILE,FLP: ABNORMAL
T4 FREE SERPL-MCNC: 1.1 NG/DL (ref 0.7–1.5)
TRIGL SERPL-MCNC: 110 MG/DL (ref ?–150)
TSH SERPL DL<=0.05 MIU/L-ACNC: 2.58 UIU/ML (ref 0.36–3.74)
VLDLC SERPL CALC-MCNC: 22 MG/DL

## 2019-12-13 RX ORDER — GLIPIZIDE 5 MG/1
5 TABLET, FILM COATED, EXTENDED RELEASE ORAL DAILY
Qty: 30 TAB | Refills: 5 | Status: SHIPPED | OUTPATIENT
Start: 2019-12-13 | End: 2019-12-17 | Stop reason: SDUPTHER

## 2019-12-17 ENCOUNTER — OFFICE VISIT (OUTPATIENT)
Dept: INTERNAL MEDICINE CLINIC | Age: 29
End: 2019-12-17

## 2019-12-17 ENCOUNTER — HOSPITAL ENCOUNTER (OUTPATIENT)
Dept: LAB | Age: 29
Discharge: HOME | End: 2019-12-17
Payer: MEDICARE

## 2019-12-17 VITALS
HEART RATE: 93 BPM | DIASTOLIC BLOOD PRESSURE: 86 MMHG | WEIGHT: 248 LBS | SYSTOLIC BLOOD PRESSURE: 128 MMHG | TEMPERATURE: 98.6 F | BODY MASS INDEX: 46.82 KG/M2 | HEIGHT: 61 IN | OXYGEN SATURATION: 95 % | RESPIRATION RATE: 17 BRPM

## 2019-12-17 DIAGNOSIS — E66.01 OBESITY, MORBID (HCC): ICD-10-CM

## 2019-12-17 DIAGNOSIS — Z12.4 SCREENING FOR CERVICAL CANCER: ICD-10-CM

## 2019-12-17 DIAGNOSIS — Z01.419 WELL WOMAN EXAM WITH ROUTINE GYNECOLOGICAL EXAM: Primary | ICD-10-CM

## 2019-12-17 DIAGNOSIS — E11.21 TYPE 2 DIABETES WITH NEPHROPATHY (HCC): ICD-10-CM

## 2019-12-17 PROCEDURE — 87624 HPV HI-RISK TYP POOLED RSLT: CPT

## 2019-12-17 PROCEDURE — 88142 CYTOPATH C/V THIN LAYER: CPT

## 2019-12-17 RX ORDER — GLIPIZIDE 5 MG/1
5 TABLET, FILM COATED, EXTENDED RELEASE ORAL DAILY
Qty: 30 TAB | Refills: 5 | Status: SHIPPED | OUTPATIENT
Start: 2019-12-17 | End: 2020-09-11

## 2019-12-17 NOTE — PROGRESS NOTES
ROOM # 5  Identified pt with two pt identifiers(name and ). Reviewed record in preparation for visit and have obtained necessary documentation. Chief Complaint   Patient presents with    Well Woman      Adry Chu preferred language for health care discussion is english/other. Is the patient using any DME equipment during OV? Joe Cortez is due for:  Health Maintenance Due   Topic    Pneumococcal 0-64 years (1 of 1 - PPSV23)    EYE EXAM RETINAL OR DILATED     FOOT EXAM Q1     MEDICARE YEARLY EXAM      Health Maintenance reviewed and discussed per provider  Please order/place referral if appropriate. Advance Directive:  1. Do you have an advance directive in place? Patient Reply: NO    2. If not, would you like material regarding how to put one in place? NO    Coordination of Care:  1. Have you been to the ER, urgent care clinic since your last visit? Hospitalized since your last visit? NO    2. Have you seen or consulted any other health care providers outside of the [address] [place] [address] since your last visit? Include any pap smears or colon screening. NO    Patient is accompanied by self I have received verbal consent from Adry Chu to discuss any/all medical information while they are present in the room.     Learning Assessment:  Learning Assessment 2015   PRIMARY LEARNER Patient   HIGHEST LEVEL OF EDUCATION - PRIMARY LEARNER  DID NOT GRADUATE HIGH SCHOOL   BARRIERS PRIMARY LEARNER NONE   CO-LEARNER CAREGIVER No   PRIMARY LANGUAGE ENGLISH   LEARNER PREFERENCE PRIMARY READING   ANSWERED BY patient   RELATIONSHIP SELF     Depression Screening:  3 most recent [facility] Screens 2017   PHQ Not Done - - - Active Diagnosis of Depression or Bipolar Disorder Active Diagnosis of Depression or Bipolar Disorder - -   Little interest or pleasure in doing things Several days Not at all Not at all Not at all - Not at all Not at all   Feeling down, depressed, irritable, or hopeless Several days Not at all Not at all More than half the days - Not at all Not at all   Total Score PHQ 2 2 0 0 2 - 0 0     Abuse Screening:  Abuse Screening Questionnaire 6/25/2018 4/27/2015   Do you ever feel afraid of your partner? N N   Are you in a relationship with someone who physically or mentally threatens you? N N   Is it safe for you to go home?  Y Y     Fall Risk  n/i

## 2019-12-17 NOTE — PROGRESS NOTES
HISTORY OF PRESENT ILLNESS  Ricardo Pugh is a 34 y.o. female. Visit Vitals  /86 (BP 1 Location: Left arm, BP Patient Position: Sitting)   Pulse 93   Temp 98.6 °F (37 °C) (Oral)   Resp 17   Ht 5' 1\" (1.549 m)   Wt 248 lb (112.5 kg)   SpO2 95%   BMI 46.86 kg/m²       Well Woman   The history is provided by the patient. This is a new problem. Review of Systems   Constitutional: Negative. Respiratory: Negative. Cardiovascular: Negative. Gastrointestinal: Negative. Genitourinary: Negative. Physical Exam  Vitals signs and nursing note reviewed. Exam conducted with a chaperone present. Constitutional:       General: She is not in acute distress. Appearance: Normal appearance. She is well-developed. She is obese. She is not diaphoretic. HENT:      Right Ear: Tympanic membrane, ear canal and external ear normal.      Left Ear: Tympanic membrane, ear canal and external ear normal.      Mouth/Throat:      Lips: Pink. Pharynx: Oropharynx is clear. Neck:      Thyroid: No thyromegaly. Trachea: Trachea normal.   Cardiovascular:      Rate and Rhythm: Normal rate and regular rhythm. Pulmonary:      Effort: Pulmonary effort is normal.      Breath sounds: Normal breath sounds. Chest:      Breasts: Breasts are symmetrical.         Right: Normal.         Left: Normal.   Genitourinary:     Pubic Area: No rash. Labia:         Right: No rash or tenderness. Left: No rash or tenderness. Vagina: Normal.      Cervix: Normal.      Uterus: Normal.       Adnexa: Right adnexa normal and left adnexa normal.   Lymphadenopathy:      Cervical: No cervical adenopathy. Skin:     General: Skin is warm and dry. Neurological:      Mental Status: She is alert and oriented to person, place, and time. ASSESSMENT and PLAN    ICD-10-CM ICD-9-CM    1. Well woman exam with routine gynecological exam Z01.419 V72.31 PAP + HPV DNA (HIGH RISK)      NUSWAB VAGINITIS PLUS   2. Screening for cervical cancer Z12.4 V76.2    3. Type 2 diabetes with nephropathy (HCC) E11.21 250.40      583.81    4. Obesity, morbid ([address] E66.01 278.01      Lab done last week  Discussed also her diabetes which is worse. Started glipizide. Avoid skipping meals on this medication  Watch diet --she eats a lot of fruits and veggies trying to avoid extra sweets.  Advised that fruit has a lot of natural sugar so must be limited    F/u 3-4 months for DM

## 2019-12-18 ENCOUNTER — HOSPITAL ENCOUNTER (OUTPATIENT)
Dept: LAB | Age: 29
Discharge: HOME | End: 2019-12-18
Payer: MEDICARE

## 2019-12-18 DIAGNOSIS — Z01.419 WELL WOMAN EXAM WITH ROUTINE GYNECOLOGICAL EXAM: ICD-10-CM

## 2019-12-18 PROCEDURE — 87798 DETECT AGENT NOS DNA AMP: CPT

## 2019-12-22 LAB
A VAGINAE DNA VAG QL NAA+PROBE: NORMAL SCORE
BVAB2 DNA VAG QL NAA+PROBE: NORMAL SCORE
C ALBICANS DNA VAG QL NAA+PROBE: NEGATIVE
C GLABRATA DNA VAG QL NAA+PROBE: NEGATIVE
C TRACH RRNA SPEC QL NAA+PROBE: NORMAL
MEGA1 DNA VAG QL NAA+PROBE: NORMAL SCORE
N GONORRHOEA RRNA SPEC QL NAA+PROBE: NORMAL
SPECIMEN SOURCE: NORMAL
T VAGINALIS RRNA SPEC QL NAA+PROBE: NEGATIVE

## 2020-01-10 DIAGNOSIS — M54.50 CHRONIC LOW BACK PAIN WITHOUT SCIATICA, UNSPECIFIED BACK PAIN LATERALITY: ICD-10-CM

## 2020-01-10 DIAGNOSIS — G89.29 CHRONIC LOW BACK PAIN WITHOUT SCIATICA, UNSPECIFIED BACK PAIN LATERALITY: ICD-10-CM

## 2020-01-10 DIAGNOSIS — Z79.891 LONG TERM (CURRENT) USE OF OPIATE ANALGESIC: ICD-10-CM

## 2020-01-10 DIAGNOSIS — Z76.0 MEDICATION REFILL: ICD-10-CM

## 2020-01-14 RX ORDER — HYDROCODONE BITARTRATE AND ACETAMINOPHEN 5; 325 MG/1; MG/1
1 TABLET ORAL
Qty: 120 TAB | Refills: 0 | Status: SHIPPED | OUTPATIENT
Start: 2020-01-14 | End: 2020-02-18 | Stop reason: SDUPTHER

## 2020-01-30 ENCOUNTER — PATIENT OUTREACH (OUTPATIENT)
Dept: INTERNAL MEDICINE CLINIC | Age: 30
End: 2020-01-30

## 2020-01-30 NOTE — PROGRESS NOTES
Complex Case Management      Date/Time:  2020 11:54 AM    Method of communication with patient:phone    [facility] ([facility]) contacted the patient by telephone to perform Ambulatory Care Coordination. Verified name and  (PHI) with patient as identifiers. Provided introduction to self, and explanation of the Ambulatory Care Manager's role. Reviewed most recent clinic visit w/ patient who verbalized understanding. Patient given an opportunity to ask questions. Top Challenges reviewed with the patient   1. States weight down to 243 lbs today  2. Does not check blood glucose, has glucometer  3. States she is \"working on\" quitting smoking     The patient agrees to contact the PCP office or the [facility] for questions related to their healthcare. Provided contact information for future reference. Disease Specific:   N/A  DM - patient agrees to check FSBS at least 2x per week  Obesity - patient states she will continue home exercise and healthy eating  Smoking- patient and ACM discussed options to help decrease number of cigarettes she smokes daily. Asthma - patient states she does not have nebulizer, denies cough, shortness of breath at this time. Home Health Active: No    DME Active: No    Barriers to care? None noted at present time    Advance Care Planning:   Does patient have an Advance Directive:  not on file     Medication(s):   Medication reconciliation was performed with patient, who verbalizes understanding of administration of home medications. There were no barriers to obtaining medications identified at this time. Referral to Pharm D needed: no     Current Outpatient Medications   Medication Sig    HYDROcodone-acetaminophen (NORCO) 5-325 mg per tablet Take 1 Tab by mouth every six (6) hours as needed for Pain for up to 30 days. Max Daily Amount: 4 Tabs. Indications: pain    glipiZIDE SR (GLUCOTROL XL) 5 mg CR tablet Take 1 Tab by mouth daily.  Indications: type 2 diabetes mellitus    fluticasone propionate (FLONASE) 50 mcg/actuation nasal spray 2 Sprays by Both Nostrils route daily.  buPROPion SR (WELLBUTRIN SR) 150 mg SR tablet Take 1 Tab by mouth two (2) times a day.  loratadine (CLARITIN REDITABS) 10 mg dissolvable tablet Take 1 Tab by mouth daily.  metFORMIN (GLUCOPHAGE) 1,000 mg tablet take 1 tablet by mouth twice a day with food (Patient taking differently: 1,000 mg daily.)    albuterol (PROVENTIL HFA, VENTOLIN HFA, PROAIR HFA) 90 mcg/actuation inhaler Take 2 Puffs by inhalation every six (6) hours as needed for Wheezing.  fluticasone propionate (FLOVENT HFA) 110 mcg/actuation inhaler Take 1 Puff by inhalation every twelve (12) hours.  famotidine (PEPCID) 20 mg tablet Take 1 Tab by mouth daily. (Patient taking differently: Take 20 mg by mouth as needed.)    diphenhydrAMINE (BENADRYL) 25 mg capsule 25 mg.    albuterol (PROVENTIL VENTOLIN) 2.5 mg /3 mL (0.083 %) nebu 3 mL by Nebulization route three (3) times daily as needed (wheezing). Indications: bronchospasm    meloxicam (MOBIC) 15 mg tablet Take 1 Tab by mouth daily. No current facility-administered medications for this visit.         [facility] follow up appointment(s):   Future Appointments   Date Time Provider [facility] [place]   4/20/2020  5:00 [place] [address] [address]        Non-[facility] follow up appointment(s): NA    Goals      Independent self-management skills (pt-stated)      Patient will attend all physician appointments as scheduled    DM  Patient will begin checking blood glucose at least 2x week  Patient will continue home exercise program  Patient will follow ADA diet recommendations    Weight Loss  Patient will continue home exercise program  Patient will force fluids to 6 - 8 glasses per daily to prevent dehydration  Patient will eat healthy diet, high in vegetables and fruits, limit starch intake

## 2020-02-18 DIAGNOSIS — M54.50 CHRONIC LOW BACK PAIN WITHOUT SCIATICA, UNSPECIFIED BACK PAIN LATERALITY: ICD-10-CM

## 2020-02-18 DIAGNOSIS — Z76.0 MEDICATION REFILL: ICD-10-CM

## 2020-02-18 DIAGNOSIS — G89.29 CHRONIC LOW BACK PAIN WITHOUT SCIATICA, UNSPECIFIED BACK PAIN LATERALITY: ICD-10-CM

## 2020-02-18 DIAGNOSIS — Z79.891 LONG TERM (CURRENT) USE OF OPIATE ANALGESIC: ICD-10-CM

## 2020-02-18 RX ORDER — HYDROCODONE BITARTRATE AND ACETAMINOPHEN 5; 325 MG/1; MG/1
1 TABLET ORAL
Qty: 120 TAB | Refills: 0 | Status: SHIPPED | OUTPATIENT
Start: 2020-02-18 | End: 2020-03-17 | Stop reason: SDUPTHER

## 2020-02-26 ENCOUNTER — PATIENT OUTREACH (OUTPATIENT)
Dept: CASE MANAGEMENT | Age: 30
End: 2020-02-26

## 2020-02-26 NOTE — PROGRESS NOTES
ACM attempted to contact patient at listed number. VM left identifying self. Direct contact information given with request for return call. EMR reviewed. ACM will continue to follow.

## 2020-03-11 ENCOUNTER — PATIENT OUTREACH (OUTPATIENT)
Dept: CASE MANAGEMENT | Age: 30
End: 2020-03-11

## 2020-03-17 DIAGNOSIS — Z79.891 LONG TERM (CURRENT) USE OF OPIATE ANALGESIC: ICD-10-CM

## 2020-03-17 DIAGNOSIS — Z76.0 MEDICATION REFILL: ICD-10-CM

## 2020-03-17 DIAGNOSIS — G89.29 CHRONIC LOW BACK PAIN WITHOUT SCIATICA, UNSPECIFIED BACK PAIN LATERALITY: ICD-10-CM

## 2020-03-17 DIAGNOSIS — M54.50 CHRONIC LOW BACK PAIN WITHOUT SCIATICA, UNSPECIFIED BACK PAIN LATERALITY: ICD-10-CM

## 2020-03-17 NOTE — TELEPHONE ENCOUNTER
Patient request for refill. Last OV 12/17/19, next scheduled 4/20/20. Requested Prescriptions     Pending Prescriptions Disp Refills    HYDROcodone-acetaminophen (Norco) 5-325 mg per tablet 120 Tab 0     Sig: Take 1 Tab by mouth every six (6) hours as needed for Pain for up to 30 days. Max Daily Amount: 4 Tabs.  Indications: pain

## 2020-03-18 RX ORDER — HYDROCODONE BITARTRATE AND ACETAMINOPHEN 5; 325 MG/1; MG/1
1 TABLET ORAL
Qty: 120 TAB | Refills: 0 | Status: SHIPPED | OUTPATIENT
Start: 2020-03-18 | End: 2020-04-20 | Stop reason: SDUPTHER

## 2020-04-20 ENCOUNTER — VIRTUAL VISIT (OUTPATIENT)
Dept: INTERNAL MEDICINE CLINIC | Age: 30
End: 2020-04-20

## 2020-04-20 VITALS — TEMPERATURE: 97.6 F

## 2020-04-20 DIAGNOSIS — E66.01 OBESITY, MORBID (HCC): ICD-10-CM

## 2020-04-20 DIAGNOSIS — E11.21 TYPE 2 DIABETES WITH NEPHROPATHY (HCC): ICD-10-CM

## 2020-04-20 DIAGNOSIS — G89.29 CHRONIC LOW BACK PAIN WITHOUT SCIATICA, UNSPECIFIED BACK PAIN LATERALITY: Primary | ICD-10-CM

## 2020-04-20 DIAGNOSIS — Z79.891 LONG TERM (CURRENT) USE OF OPIATE ANALGESIC: ICD-10-CM

## 2020-04-20 DIAGNOSIS — M54.50 CHRONIC LOW BACK PAIN WITHOUT SCIATICA, UNSPECIFIED BACK PAIN LATERALITY: Primary | ICD-10-CM

## 2020-04-20 DIAGNOSIS — Z76.0 MEDICATION REFILL: ICD-10-CM

## 2020-04-20 RX ORDER — HYDROCODONE BITARTRATE AND ACETAMINOPHEN 5; 325 MG/1; MG/1
1 TABLET ORAL
Qty: 120 TAB | Refills: 0 | Status: SHIPPED | OUTPATIENT
Start: 2020-06-16 | End: 2020-07-17 | Stop reason: SDUPTHER

## 2020-04-20 RX ORDER — HYDROCODONE BITARTRATE AND ACETAMINOPHEN 5; 325 MG/1; MG/1
1 TABLET ORAL
Qty: 120 TAB | Refills: 0 | Status: SHIPPED | OUTPATIENT
Start: 2020-04-20 | End: 2020-05-20

## 2020-04-20 RX ORDER — HYDROCODONE BITARTRATE AND ACETAMINOPHEN 5; 325 MG/1; MG/1
1 TABLET ORAL
Qty: 120 TAB | Refills: 0 | Status: SHIPPED | OUTPATIENT
Start: 2020-05-18 | End: 2020-06-17

## 2020-04-20 NOTE — PROGRESS NOTES
Identified pt with two pt identifiers(name and ). Reviewed record in preparation for visit and have obtained necessary documentation. Chief Complaint   Patient presents with    Diabetes     4 month fu       [place] preferred language for health care discussion is english/other. Is the patient using any DME equipment during OV? Tejal Santoro is due for:  Health Maintenance Due   Topic    Pneumococcal 0-64 years (1 of 1 - PPSV23)    Eye Exam Retinal or Dilated     Foot Exam Q1     Medicare Yearly Exam      Health Maintenance reviewed and discussed per provider  Please order/place referral if appropriate. Advance Directive:  1. Do you have an advance directive in place? Patient Reply: NO    2. If not, would you like material regarding how to put one in place? NO    Coordination of Care:  1. Have you been to the ER, urgent care clinic since your last visit? Hospitalized since your last visit? NO    2. Have you seen or consulted any other health care providers outside of the [address] [place] [address] since your last visit? Include any pap smears or colon screening.  NO        Learning Assessment:  Learning Assessment 2015   PRIMARY LEARNER Patient   HIGHEST LEVEL OF EDUCATION - PRIMARY LEARNER  DID NOT GRADUATE HIGH SCHOOL   BARRIERS PRIMARY LEARNER NONE   CO-LEARNER CAREGIVER No   PRIMARY LANGUAGE ENGLISH   LEARNER PREFERENCE PRIMARY READING   ANSWERED BY patient   RELATIONSHIP SELF     Depression Screening:  3 most recent hospitals 36 Screens 2017   PHQ Not Done - - - Active Diagnosis of Depression or Bipolar Disorder Active Diagnosis of Depression or Bipolar Disorder - -   Little interest or pleasure in doing things Several days Not at all Not at all Not at all - Not at all Not at all   Feeling down, depressed, irritable, or hopeless Several days Not at all Not at all More than half the days - Not at all Not at all   Total Score PHQ 2 2 0 0 2 - 0 0     Abuse Screening:  Abuse Screening Questionnaire 6/25/2018 4/27/2015   Do you ever feel afraid of your partner? N N   Are you in a relationship with someone who physically or mentally threatens you? N N   Is it safe for you to go home?  Y Y     Fall Risk  n/i

## 2020-04-20 NOTE — PROGRESS NOTES
Consent: Curtis Olivarez, who was seen by synchronous (real-time) audio-video technology, and/or her healthcare decision maker, is aware that this patient-initiated, Telehealth encounter on 4/20/2020 is a billable service, with coverage as determined by her insurance carrier. She is aware that she may receive a bill and has provided verbal consent to proceed: Yes. Assessment & Plan:   Diagnoses and all orders for this visit:    1. Chronic low back pain without sciatica, unspecified back pain laterality  -     HYDROcodone-acetaminophen (Norco) 5-325 mg per tablet; Take 1 Tab by mouth every six (6) hours as needed for Pain for up to 30 days. Max Daily Amount: 4 Tabs. Indications: pain  -     HYDROcodone-acetaminophen (Norco) 5-325 mg per tablet; Take 1 Tab by mouth every six (6) hours as needed for Pain for up to 30 days. Max Daily Amount: 4 Tabs. Indications: pain  -     HYDROcodone-acetaminophen (Norco) 5-325 mg per tablet; Take 1 Tab by mouth every six (6) hours as needed for Pain for up to 30 days. Max Daily Amount: 4 Tabs. Indications: pain    2. Type 2 diabetes with nephropathy (HCC)    3. Obesity, morbid ([address]    4. Long term (current) use of opiate analgesic  -     HYDROcodone-acetaminophen (Norco) 5-325 mg per tablet; Take 1 Tab by mouth every six (6) hours as needed for Pain for up to 30 days. Max Daily Amount: 4 Tabs. Indications: pain  -     HYDROcodone-acetaminophen (Norco) 5-325 mg per tablet; Take 1 Tab by mouth every six (6) hours as needed for Pain for up to 30 days. Max Daily Amount: 4 Tabs. Indications: pain  -     HYDROcodone-acetaminophen (Norco) 5-325 mg per tablet; Take 1 Tab by mouth every six (6) hours as needed for Pain for up to 30 days. Max Daily Amount: 4 Tabs. Indications: pain    5. Medication refill  -     HYDROcodone-acetaminophen (Norco) 5-325 mg per tablet; Take 1 Tab by mouth every six (6) hours as needed for Pain for up to 30 days. Max Daily Amount: 4 Tabs.  Indications: pain  -     HYDROcodone-acetaminophen (Norco) 5-325 mg per tablet; Take 1 Tab by mouth every six (6) hours as needed for Pain for up to 30 days. Max Daily Amount: 4 Tabs. Indications: pain  -     HYDROcodone-acetaminophen (Norco) 5-325 mg per tablet; Take 1 Tab by mouth every six (6) hours as needed for Pain for up to 30 days. Max Daily Amount: 4 Tabs. Indications: pain         reviewed and appropriate activity noted. No adverse activity noted. Will need UDS next visit    F/u 3 months for diabetic check as well as next med refill          712  Subjective:   Karyn Gonzalez is a 34 y.o. female who was seen for Diabetes (4 month fu )  checks her blood sugar  AM 93, PM in . Weight has come down some    Diabetes   The history is provided by the patient. This is a chronic problem. The current episode started more than 1 week ago. The problem occurs daily. The problem has not changed since onset. Exacerbated by: diet. The symptoms are relieved by medications (diet). Treatments tried: see med list.   Weight Management   The history is provided by the patient (pt with obesity). This is a chronic problem. The current episode started more than 1 week ago. Prior to Admission medications    Medication Sig Start Date End Date Taking? Authorizing Provider   HYDROcodone-acetaminophen (Norco) 5-325 mg per tablet Take 1 Tab by mouth every six (6) hours as needed for Pain for up to 30 days. Max Daily Amount: 4 Tabs. Indications: pain 4/20/20 5/20/20 Yes Levon Boas, MD   HYDROcodone-acetaminophen (Norco) 5-325 mg per tablet Take 1 Tab by mouth every six (6) hours as needed for Pain for up to 30 days. Max Daily Amount: 4 Tabs. Indications: pain 5/18/20 6/17/20 Yes Levon Boas, MD   HYDROcodone-acetaminophen (Norco) 5-325 mg per tablet Take 1 Tab by mouth every six (6) hours as needed for Pain for up to 30 days. Max Daily Amount: 4 Tabs.  Indications: pain 6/16/20 7/16/20 Yes Levon Boas, MD glipiZIDE SR (GLUCOTROL XL) 5 mg CR tablet Take 1 Tab by mouth daily. Indications: type 2 diabetes mellitus 12/17/19  Yes General Layton Pendleton MD   fluticasone propionate (FLONASE) 50 mcg/actuation nasal spray 2 Sprays by Both Nostrils route daily. 12/10/19  Yes Shona Castro MD   buPROPion SR [facility] SR) 150 mg SR tablet Take 1 Tab by mouth two (2) times a day. 12/10/19  Yes General Layton Pendleton MD   loratadine (CLARITIN REDITABS) 10 mg dissolvable tablet Take 1 Tab by mouth daily. 12/10/19  Yes Shona Castro MD   metFORMIN (GLUCOPHAGE) 1,000 mg tablet take 1 tablet by mouth twice a day with food  Patient taking differently: 1,000 mg daily. 10/22/19  Yes Shona Castro MD   albuterol (PROVENTIL HFA, VENTOLIN HFA, PROAIR HFA) 90 mcg/actuation inhaler Take 2 Puffs by inhalation every six (6) hours as needed for Wheezing. 8/1/19  Yes General Layton Pendleton MD   albuterol (PROVENTIL VENTOLIN) 2.5 mg /3 mL (0.083 %) nebu 3 mL by Nebulization route three (3) times daily as needed (wheezing). Indications: bronchospasm 8/1/19  Yes Shona Castro MD   fluticasone propionate (FLOVENT HFA) 110 mcg/actuation inhaler Take 1 Puff by inhalation every twelve (12) hours. 8/1/19  Yes Shona Castro MD   famotidine (PEPCID) 20 mg tablet Take 1 Tab by mouth daily. Patient taking differently: Take 20 mg by mouth as needed. 1/30/17  Yes General Layton Pendleton MD   diphenhydrAMINE (BENADRYL) 25 mg capsule 25 mg. 3/21/14  Yes Provider, Frankie   HYDROcodone-acetaminophen (Norco) 5-325 mg per tablet Take 1 Tab by mouth every six (6) hours as needed for Pain for up to 30 days. Max Daily Amount: 4 Tabs. Indications: pain 3/18/20 4/20/20  Shona Castro MD   meloxicam (MOBIC) 15 mg tablet Take 1 Tab by mouth daily. 8/1/19   Keerthi General Headings, MD   azithromycin (ZITHROMAX Z-DELTA) 250 mg tablet Take two days PO day one; Take one tab PO days two through five.  1/19/19 4/20/20  Zaida Casarez NP     Allergies Allergen Reactions    Other Food Anaphylaxis     [place], guacamole    Argan Kernal Oil (Arganina Spinosa) Hives    Naproxen Swelling    Percocet [Oxycodone-Acetaminophen] Itching           Review of Systems   Constitutional: Positive for weight gain. Objective:     Visit Vitals  Temp 97.6 °F (36.4 °C) (Oral)    getting her hair done! General: alert, cooperative, no distress   Mental  status: normal mood, behavior, speech, dress, motor activity, and thought processes, able to follow commands   HENT: NCAT   Neck: no visualized mass   Resp: no respiratory distress   Neuro: no gross deficits   Skin: no discoloration or lesions of concern on visible areas   Psychiatric: normal affect, consistent with stated mood, no evidence of hallucinations     Additional exam findings: We discussed the expected course, resolution and complications of the diagnosis(es) in detail. Medication risks, benefits, costs, interactions, and alternatives were discussed as indicated. I advised her to contact the office if her condition worsens, changes or fails to improve as anticipated. She expressed understanding with the diagnosis(es) and plan. Carina Gaxiola is a 34 y.o. female being evaluated by a video visit encounter for concerns as above. A caregiver was present when appropriate. Due to this being a TeleHealth encounter (During [facility]- public [facility] emergency), evaluation of the following organ systems was limited: Vitals/Constitutional/EENT/Resp/CV/GI//MS/Neuro/Skin/Heme-Lymph-Imm. Pursuant to the emergency declaration under the [facility], [facility]5 waiver authority and the [employer] and [employer] Act, this Virtual  Visit was conducted, with patient's (and/or legal guardian's) consent, to reduce the patient's risk of exposure to COVID-19 and provide necessary medical care.      Services were provided through a video synchronous discussion virtually to substitute for in-person clinic visit. Patient and provider were located at their individual homes.         Monik Blankenship MD

## 2020-07-17 DIAGNOSIS — G89.29 CHRONIC LOW BACK PAIN WITHOUT SCIATICA, UNSPECIFIED BACK PAIN LATERALITY: ICD-10-CM

## 2020-07-17 DIAGNOSIS — Z76.0 MEDICATION REFILL: ICD-10-CM

## 2020-07-17 DIAGNOSIS — Z79.891 LONG TERM (CURRENT) USE OF OPIATE ANALGESIC: ICD-10-CM

## 2020-07-17 DIAGNOSIS — M54.50 CHRONIC LOW BACK PAIN WITHOUT SCIATICA, UNSPECIFIED BACK PAIN LATERALITY: ICD-10-CM

## 2020-07-17 RX ORDER — HYDROCODONE BITARTRATE AND ACETAMINOPHEN 5; 325 MG/1; MG/1
1 TABLET ORAL
Qty: 120 TAB | Refills: 0 | Status: SHIPPED | OUTPATIENT
Start: 2020-07-17 | End: 2020-08-17 | Stop reason: SDUPTHER

## 2020-08-12 DIAGNOSIS — Z76.0 MEDICATION REFILL: ICD-10-CM

## 2020-08-12 RX ORDER — DEXAMETHASONE 4 MG/1
TABLET ORAL
Qty: 1 INHALER | Refills: 5 | Status: SHIPPED | OUTPATIENT
Start: 2020-08-12 | End: 2021-02-26 | Stop reason: SDUPTHER

## 2020-08-17 DIAGNOSIS — M54.50 CHRONIC LOW BACK PAIN WITHOUT SCIATICA, UNSPECIFIED BACK PAIN LATERALITY: ICD-10-CM

## 2020-08-17 DIAGNOSIS — G89.29 CHRONIC LOW BACK PAIN WITHOUT SCIATICA, UNSPECIFIED BACK PAIN LATERALITY: ICD-10-CM

## 2020-08-17 DIAGNOSIS — Z76.0 MEDICATION REFILL: ICD-10-CM

## 2020-08-17 DIAGNOSIS — Z79.891 LONG TERM (CURRENT) USE OF OPIATE ANALGESIC: ICD-10-CM

## 2020-08-17 RX ORDER — HYDROCODONE BITARTRATE AND ACETAMINOPHEN 5; 325 MG/1; MG/1
1 TABLET ORAL
Qty: 120 TAB | Refills: 0 | Status: SHIPPED | OUTPATIENT
Start: 2020-08-17 | End: 2020-09-16 | Stop reason: SDUPTHER

## 2020-08-21 NOTE — TELEPHONE ENCOUNTER
Abdomen , soft, nontender, nondistended , no guarding or rigidity , no masses palpable , normal bowel sounds , Liver and Spleen , no hepatomegaly present , no hepatosplenomegaly , liver nontender , spleen not palpable Requested Prescriptions     Pending Prescriptions Disp Refills    HYDROcodone-acetaminophen (NORCO) 5-325 mg per tablet 120 Tab 0     Sig: Take 1 Tab by mouth every six (6) hours as needed for Pain. Max Daily Amount: 4 Tabs.

## 2020-08-25 NOTE — TELEPHONE ENCOUNTER
PCP: Wandy Miller MD    Last appt: 7/31/2018  No future appointments. Requested Prescriptions     Pending Prescriptions Disp Refills    HYDROcodone-acetaminophen (NORCO) 5-325 mg per tablet 120 Tab 0     Sig: Take 1 Tab by mouth every six (6) hours as needed for Pain. Max Daily Amount: 4 Tabs. Request for a 30 or 90 day supply? Provider Discretion    Pharmacy: [facility]    Other Comments:   printed and placed in PCP medication refill review folder.      Last UDS date: 07/31/2018  Pain contract signed: 02/28/2018 You met with Dr. Andrae Adhikari.      [phone], option #1   Nurse Advice (Samuel or Emelyn): [phone]   Surgery Scheduler (Angelic): [phone]  Fax: [phone]

## 2020-09-16 DIAGNOSIS — M54.50 CHRONIC LOW BACK PAIN WITHOUT SCIATICA, UNSPECIFIED BACK PAIN LATERALITY: ICD-10-CM

## 2020-09-16 DIAGNOSIS — G89.29 CHRONIC LOW BACK PAIN WITHOUT SCIATICA, UNSPECIFIED BACK PAIN LATERALITY: ICD-10-CM

## 2020-09-16 DIAGNOSIS — Z76.0 MEDICATION REFILL: ICD-10-CM

## 2020-09-16 DIAGNOSIS — Z79.891 LONG TERM (CURRENT) USE OF OPIATE ANALGESIC: ICD-10-CM

## 2020-09-16 RX ORDER — HYDROCODONE BITARTRATE AND ACETAMINOPHEN 5; 325 MG/1; MG/1
1 TABLET ORAL
Qty: 120 TAB | Refills: 0 | Status: SHIPPED | OUTPATIENT
Start: 2020-09-16 | End: 2020-10-16

## 2020-10-15 PROBLEM — I51.7 CARDIOMEGALY: Status: ACTIVE | Noted: 2020-10-15

## 2020-10-16 PROBLEM — I50.21 ACUTE SYSTOLIC HEART FAILURE (HCC): Status: ACTIVE | Noted: 2020-10-16

## 2020-10-21 ENCOUNTER — HOSPITAL ENCOUNTER (OUTPATIENT)
Dept: LAB | Age: 30
Discharge: HOME | End: 2020-10-21
Payer: MEDICARE

## 2020-10-21 ENCOUNTER — OFFICE VISIT (OUTPATIENT)
Dept: INTERNAL MEDICINE CLINIC | Age: 30
End: 2020-10-21
Payer: MEDICARE

## 2020-10-21 VITALS
OXYGEN SATURATION: 95 % | DIASTOLIC BLOOD PRESSURE: 73 MMHG | TEMPERATURE: 97.1 F | HEART RATE: 88 BPM | HEIGHT: 61 IN | SYSTOLIC BLOOD PRESSURE: 111 MMHG | RESPIRATION RATE: 20 BRPM | BODY MASS INDEX: 48.33 KG/M2 | WEIGHT: 256 LBS

## 2020-10-21 DIAGNOSIS — Z01.419 WELL WOMAN EXAM WITH ROUTINE GYNECOLOGICAL EXAM: Primary | ICD-10-CM

## 2020-10-21 DIAGNOSIS — M54.50 CHRONIC LOW BACK PAIN WITHOUT SCIATICA, UNSPECIFIED BACK PAIN LATERALITY: ICD-10-CM

## 2020-10-21 DIAGNOSIS — G89.29 CHRONIC LOW BACK PAIN WITHOUT SCIATICA, UNSPECIFIED BACK PAIN LATERALITY: ICD-10-CM

## 2020-10-21 DIAGNOSIS — Z79.891 LONG TERM (CURRENT) USE OF OPIATE ANALGESIC: ICD-10-CM

## 2020-10-21 DIAGNOSIS — I42.9 CARDIOMYOPATHY, UNSPECIFIED TYPE (HCC): ICD-10-CM

## 2020-10-21 DIAGNOSIS — Z09 HOSPITAL DISCHARGE FOLLOW-UP: ICD-10-CM

## 2020-10-21 DIAGNOSIS — Z76.0 MEDICATION REFILL: ICD-10-CM

## 2020-10-21 DIAGNOSIS — G47.30 SLEEP APNEA, UNSPECIFIED TYPE: ICD-10-CM

## 2020-10-21 DIAGNOSIS — E11.9 CONTROLLED TYPE 2 DIABETES MELLITUS WITHOUT COMPLICATION, WITHOUT LONG-TERM CURRENT USE OF INSULIN (HCC): Chronic | ICD-10-CM

## 2020-10-21 LAB
CREAT UR-MCNC: 91 MG/DL (ref 30–125)
MICROALBUMIN UR-MCNC: 7.24 MG/DL (ref 0–3)
MICROALBUMIN/CREAT UR-RTO: 80 MG/G (ref 0–30)

## 2020-10-21 PROCEDURE — G8510 SCR DEP NEG, NO PLAN REQD: HCPCS | Performed by: INTERNAL MEDICINE

## 2020-10-21 PROCEDURE — 3046F HEMOGLOBIN A1C LEVEL >9.0%: CPT | Performed by: INTERNAL MEDICINE

## 2020-10-21 PROCEDURE — G0444 DEPRESSION SCREEN ANNUAL: HCPCS | Performed by: INTERNAL MEDICINE

## 2020-10-21 PROCEDURE — 99214 OFFICE O/P EST MOD 30 MIN: CPT | Performed by: INTERNAL MEDICINE

## 2020-10-21 PROCEDURE — G8417 CALC BMI ABV UP PARAM F/U: HCPCS | Performed by: INTERNAL MEDICINE

## 2020-10-21 PROCEDURE — 2022F DILAT RTA XM EVC RTNOPTHY: CPT | Performed by: INTERNAL MEDICINE

## 2020-10-21 PROCEDURE — 88142 CYTOPATH C/V THIN LAYER: CPT

## 2020-10-21 PROCEDURE — G8427 DOCREV CUR MEDS BY ELIG CLIN: HCPCS | Performed by: INTERNAL MEDICINE

## 2020-10-21 PROCEDURE — 1111F DSCHRG MED/CURRENT MED MERGE: CPT | Performed by: INTERNAL MEDICINE

## 2020-10-21 PROCEDURE — 87624 HPV HI-RISK TYP POOLED RSLT: CPT

## 2020-10-21 PROCEDURE — 82043 UR ALBUMIN QUANTITATIVE: CPT

## 2020-10-21 RX ORDER — HYDROCODONE BITARTRATE AND ACETAMINOPHEN 5; 325 MG/1; MG/1
1 TABLET ORAL
Qty: 120 TAB | Refills: 0 | Status: SHIPPED | OUTPATIENT
Start: 2020-10-21 | End: 2020-11-18

## 2020-10-21 NOTE — PROGRESS NOTES
HISTORY OF PRESENT ILLNESS  Jose Stephenson is a 27 y.o. female. Visit Vitals  /73 (BP 1 Location: Left arm, BP Patient Position: Sitting)   Pulse 88   Temp 97.1 °F (36.2 °C) (Oral)   Resp 20   Ht 5' 1\" (1.549 m)   Wt 256 lb (116.1 kg)   SpO2 95%   BMI 48.37 kg/m²       Was in the hospital at [facility] until yesterday with an enlarged heart, global hypokinesis and EF 38%  They diagnosed her with acute systolic heart failure. She went due to side pain from coughing but the notes says she had SOB and breathing difficulty. She denied that. But she had been seen a few days before with cough and dyspnea and was placed on steroids  The notes are very confusing. She says she had been told not to take her metformin at [facility] when they placed her on steroids so her blood sugar went up  She had xrays and di not find much      She is actually here for a WWE. Review of Systems   Constitutional: Negative for chills and fever. Respiratory: Positive for cough. Negative for sputum production, shortness of breath and wheezing. Cardiovascular: Negative for chest pain, palpitations and leg swelling. Orthopnea: sleeps on 2 pillows but she says it is not for breathing. Neurological: Negative for dizziness and headaches. Physical Exam  Vitals signs and nursing note reviewed. Constitutional:       Appearance: Normal appearance. She is well-developed. Cardiovascular:      Rate and Rhythm: Normal rate and regular rhythm. Pulmonary:      Effort: Pulmonary effort is normal.      Breath sounds: Normal breath sounds. No wheezing or rhonchi. Comments: Intermittent cough  Genitourinary:     Labia:         Right: No rash. Left: No rash. Vagina: Normal.      Cervix: Normal.      Uterus: Normal.       Adnexa: Right adnexa normal and left adnexa normal.   Musculoskeletal:      Right lower leg: No edema. Skin:     General: Skin is warm and dry.    Neurological:      General: No focal deficit present. Mental Status: She is alert and oriented to person, place, and time. Comments: Diabetic foot exam:     Left Foot:   Visual Exam: normal    Pulse DP: 2+ (normal)   Filament test: normal sensation    Vibratory sensation: normal      Right Foot:   Visual Exam: normal    Pulse DP: 2+ (normal)   Filament test: normal sensation    Vibratory sensation: normal     Psychiatric:         Mood and Affect: Mood normal.         Behavior: Behavior normal.         ASSESSMENT and PLAN    ICD-10-CM ICD-9-CM    1. Well woman exam with routine gynecological exam  Z01.419 V72.31 PAP + HPV DNA (HIGH RISK)      CANCELED: PAP, LB, RFX HPV [id number])   2. Hospital discharge follow-up  Z09 V67.59    3. Cardiomyopathy, unspecified type ([address]  I42.9 425.4 REFERRAL TO CARDIOLOGY   4. Chronic low back pain without sciatica, unspecified back pain laterality  M54.5 724.2 HYDROcodone-acetaminophen (Norco) 5-325 mg per tablet    G89.29 338.29    5. Controlled type 2 diabetes mellitus without complication, without long-term current use of insulin ([facility])  E11.9 250.00 HM DIABETES FOOT EXAM      MICROALBUMIN, UR, RAND W/ MICROALB/CREAT RATIO      MICROALBUMIN, UR, RAND W/ MICROALB/CREAT RATIO   6. Sleep apnea, unspecified type  G47.30 780.57 AMB SUPPLY ORDER   7. Medication refill  Z76.0 V68.1 HYDROcodone-acetaminophen (Norco) 5-325 mg per tablet   8. Long term (current) use of opiate analgesic  Z79.891 V58.69 HYDROcodone-acetaminophen (Norco) 5-325 mg per tablet     Available hospital/ER record reviewed including diabetic lab    PAP done today as originally scheduled    Referral to cardiology done as well    Discussed vaccines. She is \"scared\" of them.     F/u 5-6 weeks for [address]

## 2020-10-21 NOTE — PROGRESS NOTES
ROOM # 4    Gilbert Ng presents today for No chief complaint on file. [place] preferred language for health care discussion is english/other. Is someone accompanying this pt? no    Is the patient using any DME equipment during OV? no    Depression Screening:  3 most recent [facility] Screens 6/25/2018 2/14/2018 11/21/2017 6/29/2017 1/30/2017 8/25/2016 6/13/2016   PHQ Not Done - - - Active Diagnosis of Depression or Bipolar Disorder Active Diagnosis of Depression or Bipolar Disorder - -   Little interest or pleasure in doing things Several days Not at all Not at all Not at all - Not at all Not at all   Feeling down, depressed, irritable, or hopeless Several days Not at all Not at all More than half the days - Not at all Not at all   Total Score PHQ 2 2 0 0 2 - 0 0       Learning Assessment:  Learning Assessment 4/27/2015   PRIMARY LEARNER Patient   HIGHEST LEVEL OF EDUCATION - PRIMARY LEARNER  DID NOT GRADUATE HIGH SCHOOL   BARRIERS PRIMARY LEARNER NONE   CO-LEARNER CAREGIVER No   PRIMARY LANGUAGE ENGLISH   LEARNER PREFERENCE PRIMARY READING   ANSWERED BY patient   RELATIONSHIP SELF       Abuse Screening:  Abuse Screening Questionnaire 6/25/2018 4/27/2015   Do you ever feel afraid of your partner? N N   Are you in a relationship with someone who physically or mentally threatens you? N N   Is it safe for you to go home? Y Y       Fall Risk  No flowsheet data found. Health Maintenance reviewed and discussed per provider. Yes    Gilbert Ng is due for   Health Maintenance Due   Topic Date Due    Pneumococcal 0-64 years (1 of 1 - PPSV23) 04/22/1996    Eye Exam Retinal or Dilated  04/22/2000    Foot Exam Q1  06/25/2019    Medicare Yearly Exam  06/26/2019    MICROALBUMIN Q1  08/01/2020    Flu Vaccine (1) 09/01/2020     Please order/place referral if appropriate. Advance Directive:  1. Do you have an advance directive in place? Patient Reply: no    2.  If not, would you like material regarding how to put one in place? Patient Reply: no    Coordination of Care:  1. Have you been to the ER, urgent care clinic since your last visit? Hospitalized since your last visit? Yes     2. Have you seen or consulted any other health care providers outside of the [address] [place] [address] since your last visit? Include any pap smears or colon screening.  no      Pt refused flu shot

## 2020-11-11 ENCOUNTER — HOSPITAL ENCOUNTER (EMERGENCY)
Age: 30
Discharge: HOME | End: 2020-11-11
Attending: EMERGENCY MEDICINE
Payer: MEDICARE

## 2020-11-11 VITALS
OXYGEN SATURATION: 97 % | RESPIRATION RATE: 17 BRPM | SYSTOLIC BLOOD PRESSURE: 134 MMHG | BODY MASS INDEX: 49.09 KG/M2 | TEMPERATURE: 100.1 F | DIASTOLIC BLOOD PRESSURE: 78 MMHG | HEART RATE: 92 BPM | HEIGHT: 61 IN | WEIGHT: 260 LBS

## 2020-11-11 DIAGNOSIS — B37.0 ORAL THRUSH: Primary | ICD-10-CM

## 2020-11-11 LAB — DEPRECATED S PYO AG THROAT QL EIA: NEGATIVE

## 2020-11-11 PROCEDURE — 87070 CULTURE OTHR SPECIMN AEROBIC: CPT

## 2020-11-11 PROCEDURE — 87880 STREP A ASSAY W/OPTIC: CPT

## 2020-11-11 PROCEDURE — 99282 EMERGENCY DEPT VISIT SF MDM: CPT

## 2020-11-11 RX ORDER — NYSTATIN 100000 [USP'U]/ML
1 SUSPENSION ORAL 4 TIMES DAILY
Qty: 200 ML | Refills: 0 | Status: SHIPPED | OUTPATIENT
Start: 2020-11-11 | End: 2020-11-21

## 2020-11-12 ENCOUNTER — PATIENT OUTREACH (OUTPATIENT)
Dept: CASE MANAGEMENT | Age: 30
End: 2020-11-12

## 2020-11-12 NOTE — PROGRESS NOTES
.Patient contacted regarding recent discharge and COVID-19 risk. Discussed COVID-19 related testing which was not done at this time. Test results were not done. Patient informed of results, if available? no    Care Transition Nurse/ Ambulatory Care Manager/ LPN Care Coordinator contacted the patient by telephone to perform post discharge assessment. Verified name and  with patient as identifiers. Patient has following risk factors of: diabetes. CTN/ACM/LPN reviewed discharge instructions, medical action plan and red flags related to discharge diagnosis. Reviewed and educated them on any new and changed medications related to discharge diagnosis. Advised obtaining a 90-day supply of all daily and as-needed medications. Advance Care Planning:   Does patient have an Advance Directive: currently not on file; education provided     Education provided regarding infection prevention, and signs and symptoms of COVID-19 and when to seek medical attention with patient who verbalized understanding. Discussed exposure protocols and quarantine from [address] you at higher risk for severe illness  and given an opportunity for questions and concerns. The patient agrees to contact the COVID-19 hotline [phone] or PCP office for questions related to their healthcare. CTN/ACM/LPN provided contact information for future reference. From CDC: Are you at higher risk for severe illness?  Wash your hands often.  Avoid close contact (6 feet, which is about two arm lengths) with people who are sick.  Put distance between yourself and other people if COVID-19 is spreading in your community.  Clean and disinfect frequently touched surfaces.  Avoid all cruise travel and non-essential air travel.  Call your healthcare professional if you have concerns about COVID-19 and your underlying condition or if you are sick.     For more information on steps you can take to protect yourself, see CDC's How to Protect Yourself      Patient/family/caregiver given information for [facility] Loop and agrees to enroll no  Patient's preferred e-mail:  n/a  Patient's preferred phone number: n/a  Based on Loop alert triggers, patient will be contacted by nurse care manager for worsening symptoms. Plan for follow-up call in 7-14 days based on severity of symptoms and risk factors. Seen in [facility] ED 11/11/2020 Oral thrush;Declined CM  Patient educated on use of inhalers and rinsing after use.   ACM offered Complex case Management but patient declined at this time

## 2020-11-12 NOTE — DISCHARGE INSTRUCTIONS
Patient Education        Lety Tamela in Children: Care Instructions  Your Care Instructions  Lety Tamela is a yeast infection inside the mouth. It can look like milk, formula, or cottage cheese but is hard to remove. If you scrape the thrush away, the skin underneath may bleed. Your child might get thrush after using antibiotics. Often there is not a specific cause. It sometimes occurs at the same time as a diaper rash. Lety Tamela in infants and young children isn't a serious problem. It usually goes away on its own. Some children may need antifungal medicine. Follow-up care is a key part of your child's treatment and safety. Be sure to make and go to all appointments, and call your doctor if your child is having problems. It's also a good idea to know your child's test results and keep a list of the medicines your child takes. How can you care for your child at home? · Clean bottle nipples and pacifiers regularly in boiling water. · If you are breastfeeding, use an antifungal medicine, such as nystatin (Mycostatin), on your nipples. Dry your nipples after breastfeeding. · If your child is eating solid foods, you can massage plain, unflavored yogurt around the inside of your child's mouth. Check the label to make sure that the yogurt contains live cultures. Yogurt may help healthy bacteria grow in the mouth. These bacteria can stop yeast growth. · Be safe with medicines. Have your child take medicines exactly as prescribed. Call your doctor if you think your child is having a problem with his or her medicine. When should you call for help? Watch closely for changes in your child's health, and be sure to contact your doctor if:    · Your child will not eat or drink.     · You have trouble giving or applying the medicine to your child.     · Your child still has thrush after 7 days.     · Your child gets a new diaper rash.     · Your child is not acting normally.     · Your child has a fever.    Where can you learn more?  Go to http://www.gray.com/  Enter V150 in the search box to learn more about \"Thrush in Children: Care Instructions. \"  Current as of: May 27, 2020               Content Version: 12.6  © 1804-2104 [employer]. Care instructions adapted under license by [employer] (which disclaims liability or warranty for this information). If you have questions about a medical condition or this instruction, always ask your healthcare professional. [address] any warranty or liability for your use of this information.

## 2020-11-12 NOTE — ED PROVIDER NOTES
EMERGENCY DEPARTMENT HISTORY AND PHYSICAL EXAM    8:39 PM      Date: 11/11/2020  Patient Name: Diogo Sue    History of Presenting Illness     Chief Complaint   Patient presents with    Sore Throat    Mouth Lesions         History Provided By: Patient    Additional History (Context): Diogo Sue is a 27 y.o. female with past medical history significant for asthma, bipolar disorder, depression, diabetes, heart failure, schizophrenia, sleep apnea, tobacco abuse, uterine fibroid who presents with complaints of white patches in the mouth and a sore throat for the last 2 to 3 days. She does admit to use of a steroid inhaler for her asthma and does not typically rinse her mouth afterward. She denies any neck pain or stiffness, cough, shortness of breath, chest pain, headache, visual changes, dizziness, or fever/chills. She has not tried anything over-the-counter thus far. PCP: Sharron Serna MD    Current Outpatient Medications   Medication Sig Dispense Refill    nystatin (MYCOSTATIN) 100,000 unit/mL suspension Take 5 mL by mouth four (4) times daily for 10 days. swish and spit 200 mL 0    HYDROcodone-acetaminophen (Norco) 5-325 mg per tablet Take 1 Tab by mouth every six (6) hours as needed for Pain for up to 30 days. Max Daily Amount: 4 Tabs. Indications: pain 120 Tab 0    empagliflozin (JARDIANCE) 25 mg tablet Take 1 Tab by mouth daily. 30 Tab 5    metFORMIN (GLUCOPHAGE) 500 mg tablet Take 1 Tab by mouth two (2) times daily (with meals). 60 Tab 0    aspirin delayed-release 81 mg tablet Take 1 Tab by mouth daily. 100 Tab 5    atorvastatin (LIPITOR) 40 mg tablet Take 1 Tab by mouth nightly. 30 Tab 5    carvediloL (COREG) 12.5 mg tablet Take 1 Tab by mouth two (2) times daily (with meals). 60 Tab 5    ergocalciferol (ERGOCALCIFEROL) 1,250 mcg (50,000 unit) capsule Take 1 Cap by mouth every seven (7) days.  4 Cap 5    sacubitril-valsartan (ENTRESTO) 24 mg/26 mg tablet Take 1 Tab by mouth every twelve (12) hours. 60 Tab 1    insulin lispro protamin-lispro (HUMALOG 75-25 MIX) flexpen 30 Units by SubCUTAneous route Before breakfast and dinner. 5 Pen 5    Flovent  mcg/actuation inhaler INHALE 1 PUFF BY MOUTH EVERY 12 HOURS 1 Inhaler 5    fluticasone propionate (FLONASE) 50 mcg/actuation nasal spray 2 Sprays by Both Nostrils route daily. 1 Bottle 5    buPROPion SR (WELLBUTRIN SR) 150 mg SR tablet Take 1 Tab by mouth two (2) times a day. 60 Tab 5    loratadine (CLARITIN REDITABS) 10 mg dissolvable tablet Take 1 Tab by mouth daily. 30 Tab 5    albuterol (PROVENTIL HFA, VENTOLIN HFA, PROAIR HFA) 90 mcg/actuation inhaler Take 2 Puffs by inhalation every six (6) hours as needed for Wheezing. 1 Inhaler 5    albuterol (PROVENTIL VENTOLIN) 2.5 mg /3 mL (0.083 %) nebu 3 mL by Nebulization route three (3) times daily as needed (wheezing). Indications: bronchospasm 100 Each 5    famotidine (PEPCID) 20 mg tablet Take 1 Tab by mouth daily. (Patient taking differently: Take 20 mg by mouth as needed.) 90 Tab 5    diphenhydrAMINE (BENADRYL) 25 mg capsule 25 mg every six (6) hours as needed. Past History     Past Medical History:  Past Medical History:   Diagnosis Date    ASCUS (atypical squamous cells of undetermined significance) on Pap smear 3/11    Asthma     [facility] disorder [facility]     Chlamydia      \"    Chronic back pain     Depression     Diabetes mellitus ([facility] 01/02/2017    Elevated blood sugar     GC (gonococcus infection) 2007/ 2004    Heart failure ([facility]     HPV (human papilloma virus) infection 3/11    Irregular menses     Schizophrenia ([facility]     Sleep apnea 2/14    Smoker     Suicide attempt ([facility] 5/08    with tylenol    Suicide attempt ([facility] [address] [facility] 10/09    Trichomonal vaginitis 8/10    Uterine fibroid        Past Surgical History:  History reviewed. No pertinent surgical history.     Family History:  Family History   Problem Relation Age of Onset    Attention Deficit Hyperactivity Disorder Brother     Bipolar Disorder Brother     Seizures Sister     Hypertension Sister     Other Sister         substance abuse       Social History:  Social History     Tobacco Use    Smoking status: Current Some Day Smoker     Packs/day: 0.25     Years: 9.00     Pack years: 2.25    Smokeless tobacco: Never Used    Tobacco comment: unable/unwilling to quit at this time   Substance Use Topics    Alcohol use: No    Drug use: No       Allergies: Allergies   Allergen Reactions    Other Food Anaphylaxis     [place], guacamole    Argan Kernal Oil ([place]gan[place] Spinosa) Hives    Naproxen Swelling    Percocet [Oxycodone-Acetaminophen] Itching         Review of Systems       Review of Systems   Constitutional: Negative. Negative for chills and fever. HENT: Positive for mouth sores and sore throat. Negative for congestion, dental problem, drooling, ear discharge, ear pain, facial swelling, hearing loss, nosebleeds, postnasal drip, rhinorrhea, sinus pressure, sinus pain, sneezing, tinnitus, trouble swallowing and voice change. Oral lesion   Eyes: Negative. Negative for pain and redness. Respiratory: Negative. Negative for cough and shortness of breath. Cardiovascular: Negative. Negative for chest pain, palpitations and leg swelling. Gastrointestinal: Negative. Negative for abdominal pain, constipation, diarrhea, nausea and vomiting. Genitourinary: Negative. Negative for dysuria, frequency, hematuria and urgency. Musculoskeletal: Negative. Negative for back pain, gait problem, joint swelling and neck pain. Skin: Negative. Negative for rash and wound. Neurological: Negative. Negative for dizziness, seizures, speech difficulty, weakness, light-headedness and headaches. Hematological: Negative for adenopathy. Does not bruise/bleed easily. All other systems reviewed and are negative.         Physical Exam     Visit Vitals  /78 (BP 1 Location: Right arm, BP Patient Position: At rest)   Pulse 92   Temp 100.1 °F (37.8 °C)   Resp 17   Ht 5' 1\" (1.549 m)   Wt 117.9 kg (260 lb)   SpO2 97%   BMI 49.13 kg/m²         Physical Exam  Vitals signs and nursing note reviewed. Constitutional:       General: She is not in acute distress. Appearance: Normal appearance. She is obese. She is not ill-appearing, toxic-appearing or diaphoretic. HENT:      Head: Normocephalic and atraumatic. Jaw: There is normal jaw occlusion. No trismus, tenderness, swelling or pain on movement. Salivary Glands: Right salivary gland is not diffusely enlarged or tender. Left salivary gland is not diffusely enlarged or tender. Right Ear: Tympanic membrane, ear canal and external ear normal. There is no impacted cerumen. Left Ear: Tympanic membrane, ear canal and external ear normal. There is no impacted cerumen. Nose: Nose normal. No mucosal edema, congestion or rhinorrhea. Mouth/Throat:      Mouth: Mucous membranes are moist. Oral lesions (White patches to the lingual and buccal mucosa as well as overlying the tongue) present. Dentition: No dental abscesses. Tongue: Lesions (White patches) present. Tongue does not deviate from midline. Palate: No lesions. Pharynx: Oropharynx is clear. Uvula midline. No pharyngeal swelling, oropharyngeal exudate, posterior oropharyngeal erythema or uvula swelling. Tonsils: No tonsillar exudate or tonsillar abscesses. 0 on the right. 0 on the left. Eyes:      General: No scleral icterus. Right eye: No discharge. Left eye: No discharge. Extraocular Movements: Extraocular movements intact. Conjunctiva/sclera: Conjunctivae normal.   Neck:      Musculoskeletal: Normal range of motion and neck supple. No edema, erythema or muscular tenderness. Thyroid: No thyromegaly. Cardiovascular:      Rate and Rhythm: Normal rate and regular rhythm. Pulses: Normal pulses.       Heart sounds: Normal heart sounds. No murmur. No friction rub. No gallop. Pulmonary:      Effort: Pulmonary effort is normal. No respiratory distress. Breath sounds: Normal breath sounds. No stridor. No wheezing, rhonchi or rales. Chest:      Chest wall: No tenderness. Abdominal:      General: Bowel sounds are normal.      Palpations: Abdomen is soft. Lymphadenopathy:      Cervical: No cervical adenopathy. Skin:     General: Skin is warm and dry. Capillary Refill: Capillary refill takes less than 2 seconds. Neurological:      General: No focal deficit present. Mental Status: She is alert. Psychiatric:         Mood and Affect: Mood normal.         Behavior: Behavior normal.           Diagnostic Study Results     Labs -  Recent Results (from the past 12 hour(s))   STREP AG SCREEN, GROUP A    Collection Time: 11/11/20  8:01 PM    Specimen: Throat   Result Value Ref Range    Group A Strep Ag ID Negative         Radiologic Studies -   No orders to display         Medical Decision Making   I am the first provider for this patient. I reviewed available nursing notes, past medical history, past surgical history, family history and social history. Vital Signs-Reviewed the patient's vital signs. Records Reviewed: Nursing Notes and Old Medical Records (Time of Review: 8:39 PM)    Pulse Oximetry Analysis - 97% on room air-normal      ED Course: Progress Notes, Reevaluation, and Consults:  8:39 PM  Initial assessment performed. The patients presenting problems have been discussed, and they/their family are in agreement with the care plan formulated and outlined with them. I have encouraged them to ask questions as they arise throughout their visit. Provider Notes (Medical Decision Making):     Patient is a 25-year-old female who presents to the ER with complaints of sore throat and lesions in the mouth.   On physical examination she has evidence of oral candidiasis secondary to use of a steroid inhaler most likely. She does admit to not rinsing her mouth afterward and did not know she was supposed to. Will obtain appropriate studies to evaluate patient's complaints and treat symptomatically. Will disposition after reassessment assuming no clinical change or worsening and appropriate response to symptomatic treatment. Rapid strep is negative. We will treat patient symptomatically salt water gargles and treat the fungal infection with nystatin oral rinse. Follow-up with PCP and directions to rinse mouth after use of steroid inhaler at all times. ER precautions discussed as well. Diagnosis     Clinical Impression:   1. Oral thrush        Disposition: Discharged home in stable condition    DISCHARGE NOTE:     Patient has been reexamined. Patient has no new complaints, changes, or physical findings. Care plan outlined and precautions discussed. Results of rapid strep and physical exam findings were reviewed with the patient. All medications were reviewed with the patient; will discharge home with nystatin oral rinse. All of patient's questions and concerns were addressed. Patient was instructed and agrees to follow up with PCP, as well as to return to the ED upon further deterioration. Patient is ready to go home. Follow-up Information     Follow up With Specialties Details Why Contact Info    Patrick Lema MD Internal Medicine Schedule an appointment as soon as possible for a visit Follow-up from the Emergency Department [address] [place] [address] [address] [place] [address]  [phone]      [facility] EMERGENCY DEPT Emergency Medicine  As needed, If symptoms worsen [address] [facility] [address]. [phone]           Current Discharge Medication List      START taking these medications    Details   nystatin (MYCOSTATIN) 100,000 unit/mL suspension Take 5 mL by mouth four (4) times daily for 10 days.  swish and spit  Qty: 200 mL, Refills: 0         CONTINUE these medications which have NOT CHANGED Details   HYDROcodone-acetaminophen (Norco) 5-325 mg per tablet Take 1 Tab by mouth every six (6) hours as needed for Pain for up to 30 days. Max Daily Amount: 4 Tabs. Indications: pain  Qty: 120 Tab, Refills: 0    Associated Diagnoses: Medication refill; Chronic low back pain without sciatica, unspecified back pain laterality; Long term (current) use of opiate analgesic      empagliflozin (JARDIANCE) 25 mg tablet Take 1 Tab by mouth daily. Qty: 30 Tab, Refills: 5      metFORMIN (GLUCOPHAGE) 500 mg tablet Take 1 Tab by mouth two (2) times daily (with meals). Qty: 60 Tab, Refills: 0    Associated Diagnoses: Medication refill      aspirin delayed-release 81 mg tablet Take 1 Tab by mouth daily. Qty: 100 Tab, Refills: 5      atorvastatin (LIPITOR) 40 mg tablet Take 1 Tab by mouth nightly. Qty: 30 Tab, Refills: 5      carvediloL (COREG) 12.5 mg tablet Take 1 Tab by mouth two (2) times daily (with meals). Qty: 60 Tab, Refills: 5      ergocalciferol (ERGOCALCIFEROL) 1,250 mcg (50,000 unit) capsule Take 1 Cap by mouth every seven (7) days. Qty: 4 Cap, Refills: 5      sacubitril-valsartan (ENTRESTO) 24 mg/26 mg tablet Take 1 Tab by mouth every twelve (12) hours. Qty: 60 Tab, Refills: 1      insulin lispro protamin-lispro (HUMALOG 75-25 MIX) flexpen 30 Units by SubCUTAneous route Before breakfast and dinner. Qty: 5 Pen, Refills: 5      Flovent  mcg/actuation inhaler INHALE 1 PUFF BY MOUTH EVERY 12 HOURS  Qty: 1 Inhaler, Refills: 5    Associated Diagnoses: Medication refill      fluticasone propionate (FLONASE) 50 mcg/actuation nasal spray 2 Sprays by Both Nostrils route daily. Qty: 1 Bottle, Refills: 5    Associated Diagnoses: Medication refill      buPROPion SR (WELLBUTRIN SR) 150 mg SR tablet Take 1 Tab by mouth two (2) times a day.   Qty: 60 Tab, Refills: 5    Associated Diagnoses: Medication refill; Bipolar disorder (HCC)      loratadine (CLARITIN REDITABS) 10 mg dissolvable tablet Take 1 Tab by mouth daily.  Qty: 30 Tab, Refills: 5    Associated Diagnoses: Chest congestion; Nasal congestion      albuterol (PROVENTIL HFA, VENTOLIN HFA, PROAIR HFA) 90 mcg/actuation inhaler Take 2 Puffs by inhalation every six (6) hours as needed for Wheezing. Qty: 1 Inhaler, Refills: 5    Associated Diagnoses: Medication refill      albuterol (PROVENTIL VENTOLIN) 2.5 mg /3 mL (0.083 %) nebu 3 mL by Nebulization route three (3) times daily as needed (wheezing). Indications: bronchospasm  Qty: 100 Each, Refills: 5    Associated Diagnoses: Medication refill      famotidine (PEPCID) 20 mg tablet Take 1 Tab by mouth daily. Qty: 90 Tab, Refills: 5    Associated Diagnoses: Medication refill      diphenhydrAMINE (BENADRYL) 25 mg capsule 25 mg every six (6) hours as needed. Dictation disclaimer:  Please note that this dictation was completed with [employer], the computer voice recognition software. Quite often unanticipated grammatical, syntax, homophones, and other interpretive errors are inadvertently transcribed by the computer software. Please disregard these errors. Please excuse any errors that have escaped final proofreading.

## 2020-11-14 LAB
BACTERIA SPEC CULT: NORMAL
SERVICE CMNT-IMP: NORMAL

## 2020-11-17 DIAGNOSIS — Z79.891 LONG TERM (CURRENT) USE OF OPIATE ANALGESIC: ICD-10-CM

## 2020-11-17 DIAGNOSIS — G89.29 CHRONIC LOW BACK PAIN WITHOUT SCIATICA, UNSPECIFIED BACK PAIN LATERALITY: ICD-10-CM

## 2020-11-17 DIAGNOSIS — M54.50 CHRONIC LOW BACK PAIN WITHOUT SCIATICA, UNSPECIFIED BACK PAIN LATERALITY: ICD-10-CM

## 2020-11-17 DIAGNOSIS — Z76.0 MEDICATION REFILL: ICD-10-CM

## 2020-11-18 RX ORDER — HYDROCODONE BITARTRATE AND ACETAMINOPHEN 5; 325 MG/1; MG/1
TABLET ORAL
Qty: 120 TAB | Refills: 0 | Status: SHIPPED | OUTPATIENT
Start: 2020-11-18 | End: 2020-12-21 | Stop reason: SDUPTHER

## 2020-11-27 ENCOUNTER — PATIENT OUTREACH (OUTPATIENT)
Dept: CASE MANAGEMENT | Age: 30
End: 2020-11-27

## 2020-11-27 NOTE — PROGRESS NOTES
.Patient resolved from [address] Transitions episode on 11/27/2020  Discussed COVID-19 related testing which was not done at this time. Test results were not done. Patient informed of results, if available? no     Patient/family has been provided the following resources and education related to COVID-19:                         Signs, symptoms and red flags related to COVID-19            CDC exposure and quarantine guidelines            [facility] exposure contact - [phone]            Contact for their local Department of Health                 Patient currently reports that the following symptoms have improved:  no new symptoms. No further outreach scheduled with this CTN/ACM/LPN/HC/ [place]. Episode of Care resolved. Patient has this CTN/ACM/LPN/HC/[place] contact information if future needs arise.

## 2020-12-21 DIAGNOSIS — G89.29 CHRONIC LOW BACK PAIN WITHOUT SCIATICA, UNSPECIFIED BACK PAIN LATERALITY: ICD-10-CM

## 2020-12-21 DIAGNOSIS — M54.50 CHRONIC LOW BACK PAIN WITHOUT SCIATICA, UNSPECIFIED BACK PAIN LATERALITY: ICD-10-CM

## 2020-12-21 DIAGNOSIS — Z79.891 LONG TERM (CURRENT) USE OF OPIATE ANALGESIC: ICD-10-CM

## 2020-12-21 DIAGNOSIS — Z76.0 MEDICATION REFILL: ICD-10-CM

## 2020-12-21 RX ORDER — HYDROCODONE BITARTRATE AND ACETAMINOPHEN 5; 325 MG/1; MG/1
1 TABLET ORAL
Qty: 120 TAB | Refills: 0 | Status: SHIPPED | OUTPATIENT
Start: 2020-12-21 | End: 2021-01-18 | Stop reason: SDUPTHER

## 2021-01-18 DIAGNOSIS — G89.29 CHRONIC LOW BACK PAIN WITHOUT SCIATICA, UNSPECIFIED BACK PAIN LATERALITY: ICD-10-CM

## 2021-01-18 DIAGNOSIS — Z79.891 LONG TERM (CURRENT) USE OF OPIATE ANALGESIC: ICD-10-CM

## 2021-01-18 DIAGNOSIS — M54.50 CHRONIC LOW BACK PAIN WITHOUT SCIATICA, UNSPECIFIED BACK PAIN LATERALITY: ICD-10-CM

## 2021-01-18 DIAGNOSIS — Z76.0 MEDICATION REFILL: ICD-10-CM

## 2021-01-18 RX ORDER — HYDROCODONE BITARTRATE AND ACETAMINOPHEN 5; 325 MG/1; MG/1
1 TABLET ORAL
Qty: 120 TAB | Refills: 0 | Status: SHIPPED | OUTPATIENT
Start: 2021-01-18 | End: 2021-02-18 | Stop reason: SDUPTHER

## 2021-01-18 NOTE — TELEPHONE ENCOUNTER
Patient request for refill. Last OV 10/21/20, no future appt scheduled. Requested Prescriptions     Pending Prescriptions Disp Refills    HYDROcodone-acetaminophen (NORCO) 5-325 mg per tablet 120 Tab 0     Sig: Take 1 Tab by mouth every six (6) hours as needed for Pain for up to 30 days. Max Daily Amount: 4 Tabs. No

## 2021-02-18 DIAGNOSIS — G89.29 CHRONIC LOW BACK PAIN WITHOUT SCIATICA, UNSPECIFIED BACK PAIN LATERALITY: ICD-10-CM

## 2021-02-18 DIAGNOSIS — Z79.891 LONG TERM (CURRENT) USE OF OPIATE ANALGESIC: ICD-10-CM

## 2021-02-18 DIAGNOSIS — Z76.0 MEDICATION REFILL: ICD-10-CM

## 2021-02-18 DIAGNOSIS — M54.50 CHRONIC LOW BACK PAIN WITHOUT SCIATICA, UNSPECIFIED BACK PAIN LATERALITY: ICD-10-CM

## 2021-02-18 RX ORDER — HYDROCODONE BITARTRATE AND ACETAMINOPHEN 5; 325 MG/1; MG/1
1 TABLET ORAL
Qty: 120 TAB | Refills: 0 | Status: SHIPPED | OUTPATIENT
Start: 2021-02-18 | End: 2021-03-18 | Stop reason: SDUPTHER

## 2021-02-24 DIAGNOSIS — Z76.0 MEDICATION REFILL: ICD-10-CM

## 2021-02-24 RX ORDER — ALBUTEROL SULFATE 90 UG/1
AEROSOL, METERED RESPIRATORY (INHALATION)
Qty: 18 G | Refills: 5 | Status: SHIPPED | OUTPATIENT
Start: 2021-02-24 | End: 2021-02-26 | Stop reason: SDUPTHER

## 2021-02-26 ENCOUNTER — VIRTUAL VISIT (OUTPATIENT)
Dept: INTERNAL MEDICINE CLINIC | Age: 31
End: 2021-02-26
Payer: MEDICARE

## 2021-02-26 DIAGNOSIS — F31.9 BIPOLAR AFFECTIVE DISORDER, REMISSION STATUS UNSPECIFIED (HCC): ICD-10-CM

## 2021-02-26 DIAGNOSIS — E11.21 TYPE 2 DIABETES WITH NEPHROPATHY (HCC): ICD-10-CM

## 2021-02-26 DIAGNOSIS — I42.9 CARDIOMYOPATHY, UNSPECIFIED TYPE (HCC): ICD-10-CM

## 2021-02-26 DIAGNOSIS — J45.21 MILD INTERMITTENT ASTHMA WITH ACUTE EXACERBATION: Primary | ICD-10-CM

## 2021-02-26 DIAGNOSIS — U07.1 COVID-19: ICD-10-CM

## 2021-02-26 DIAGNOSIS — Z76.0 MEDICATION REFILL: ICD-10-CM

## 2021-02-26 PROCEDURE — 99214 OFFICE O/P EST MOD 30 MIN: CPT | Performed by: INTERNAL MEDICINE

## 2021-02-26 PROCEDURE — 3046F HEMOGLOBIN A1C LEVEL >9.0%: CPT | Performed by: INTERNAL MEDICINE

## 2021-02-26 PROCEDURE — 2022F DILAT RTA XM EVC RTNOPTHY: CPT | Performed by: INTERNAL MEDICINE

## 2021-02-26 PROCEDURE — G8510 SCR DEP NEG, NO PLAN REQD: HCPCS | Performed by: INTERNAL MEDICINE

## 2021-02-26 PROCEDURE — G8428 CUR MEDS NOT DOCUMENT: HCPCS | Performed by: INTERNAL MEDICINE

## 2021-02-26 RX ORDER — PREDNISONE 20 MG/1
TABLET ORAL
Qty: 18 TAB | Refills: 0 | Status: SHIPPED | OUTPATIENT
Start: 2021-02-26 | End: 2021-08-17

## 2021-02-26 RX ORDER — ALBUTEROL SULFATE 90 UG/1
2 AEROSOL, METERED RESPIRATORY (INHALATION)
Qty: 18 G | Refills: 5 | Status: SHIPPED | OUTPATIENT
Start: 2021-02-26 | End: 2022-08-02

## 2021-02-26 RX ORDER — FLUTICASONE PROPIONATE 110 UG/1
1 AEROSOL, METERED RESPIRATORY (INHALATION) EVERY 12 HOURS
Qty: 1 INHALER | Refills: 5 | Status: SHIPPED | OUTPATIENT
Start: 2021-02-26 | End: 2022-08-02

## 2021-02-26 NOTE — PROGRESS NOTES
Regulo Daugherty is a 27 y.o. female who was seen by synchronous (real-time) audio-video technology on 2/26/2021. Consent: Regulo Daugherty, who was seen by synchronous (real-time) audio-video technology, and/or her healthcare decision maker, is aware that this patient-initiated, Telehealth encounter on 2/26/2021 is a billable service, with coverage as determined by her insurance carrier. She is aware that she may receive a bill and has provided verbal consent to proceed: Yes. Assessment & Plan:   Diagnoses and all orders for this visit:    1. Mild intermittent asthma with acute exacerbation  -     albuterol (PROVENTIL HFA, VENTOLIN HFA, PROAIR HFA) 90 mcg/actuation inhaler; Take 2 Puffs by inhalation every six (6) hours as needed for Wheezing.  -     fluticasone propionate (Flovent HFA) 110 mcg/actuation inhaler; Take 1 Puff by inhalation every twelve (12) hours. -     predniSONE (DELTASONE) 20 mg tablet; Take 3 tablets by mouth for three days, then 2 for 3 days, 1 for 3 days  -     AMB SUPPLY ORDER    2. COVID-19    3. Cardiomyopathy, unspecified type ([address]    4. Bipolar affective disorder, remission status unspecified ([address]    5. Type 2 diabetes with nephropathy ([facility]    6. Body mass index (BMI) 45.0-49.9, adult ([address]    7. Medication refill  -     albuterol (PROVENTIL HFA, VENTOLIN HFA, PROAIR HFA) 90 mcg/actuation inhaler; Take 2 Puffs by inhalation every six (6) hours as needed for Wheezing.  -     fluticasone propionate (Flovent HFA) 110 mcg/actuation inhaler; Take 1 Puff by inhalation every twelve (12) hours. ER notes reviewed    Her COVID sxs are minor. Positivity on the test can linger in some persons. Given the time frame and sxs, she is no longer infectious  this sounds more like her asthma.  The weather is changeable right now  Will refill her proAir, Flovent and add back her prednisone    F/u prn or as appointed        712  Subjective:   Regulo Daugherty is a 27 y.o. female who was seen for Breathing Problem (Asthma flare up x 3 days. States she can't go to the ER because she wear a mask. ) and Concern For COVID-19 (Coronavirus) (+ on 2/10/21 and + again on 2/19/21)    Pt recently tested + for COVID19 on 2/9/21    Has underlying asthma which has been a problem off and on since    Breathing Problem  The history is provided by the patient. This is a recurrent problem. The problem occurs frequently. Associated symptoms include cough and wheezing. Pertinent negatives include no fever and no chest pain. Prior to Admission medications    Medication Sig Start Date End Date Taking? Authorizing Provider   albuterol (PROVENTIL HFA, VENTOLIN HFA, PROAIR HFA) 90 mcg/actuation inhaler Take 2 Puffs by inhalation every six (6) hours as needed for Wheezing. 2/26/21  Yes Navya Pendleton MD   fluticasone propionate (Flovent HFA) 110 mcg/actuation inhaler Take 1 Puff by inhalation every twelve (12) hours. 2/26/21  Yes Ronak Rowe MD   predniSONE (DELTASONE) 20 mg tablet Take 3 tablets by mouth for three days, then 2 for 3 days, 1 for 3 days 2/26/21  Yes Ronak Rowe MD   HYDROcodone-acetaminophen [facility]) 5-325 mg per tablet Take 1 Tab by mouth every six (6) hours as needed for Pain for up to 30 days. Max Daily Amount: 4 Tabs. 2/18/21 3/20/21 Yes Ronak Rowe MD   metFORMIN (GLUCOPHAGE) 500 mg tablet take 1 tablet by mouth twice a day with meals 11/16/20  Yes Ronak Rowe MD   empagliflozin (JARDIANCE) 25 mg tablet Take 1 Tab by mouth daily. 10/20/20  Yes Jessie Deng MD   aspirin delayed-release 81 mg tablet Take 1 Tab by mouth daily. 10/20/20  Yes Jessie Deng MD   atorvastatin (LIPITOR) 40 mg tablet Take 1 Tab by mouth nightly. 10/20/20  Yes Jessie Deng MD   carvediloL (COREG) 12.5 mg tablet Take 1 Tab by mouth two (2) times daily (with meals).  10/20/20  Yes Jessie Deng MD   ergocalciferol (ERGOCALCIFEROL) 1,250 mcg (50,000 unit) capsule Take 1 Cap by mouth every seven (7) days. 10/26/20  Yes Chante Qureshi MD   sacubitril-valsartan [facility]) 24 mg/26 mg tablet Take 1 Tab by mouth every twelve (12) hours. 10/20/20  Yes Chante Qureshi MD   fluticasone propionate (FLONASE) 50 mcg/actuation nasal spray 2 Sprays by Both Nostrils route daily. 12/10/19  Yes Nigel Pendleton MD   loratadine (CLARITIN REDITABS) 10 mg dissolvable tablet Take 1 Tab by mouth daily. 12/10/19  Yes Buster Colby MD   famotidine (PEPCID) 20 mg tablet Take 1 Tab by mouth daily. Patient taking differently: Take 20 mg by mouth as needed. 1/30/17  Yes Nigel Pendleton MD   diphenhydrAMINE (BENADRYL) 25 mg capsule 25 mg every six (6) hours as needed. 3/21/14  Yes Provider, Historical   albuterol (PROVENTIL HFA, VENTOLIN HFA, PROAIR HFA) 90 mcg/actuation inhaler INHALE 2 PUFFS BY MOUTH EVERY 6 HOURS AS NEEDED FOR WHEEZING 2/24/21 2/26/21  Buster Colby MD   insulin lispro protamin-lispro (HUMALOG 75-25 MIX) flexpen 30 Units by SubCUTAneous route Before breakfast and dinner. 10/20/20   Chante Qureshi MD   Flovent  mcg/actuation inhaler INHALE 1 PUFF BY MOUTH EVERY 12 HOURS 8/12/20 2/26/21  Buster Colby MD   buPROPion SR [facility] SR) 150 mg SR tablet Take 1 Tab by mouth two (2) times a day. 12/10/19   Buster Colby MD   albuterol (PROVENTIL VENTOLIN) 2.5 mg /3 mL (0.083 %) nebu 3 mL by Nebulization route three (3) times daily as needed (wheezing).  Indications: bronchospasm 8/1/19   Buster Colby MD     Allergies   Allergen Reactions    Other Food Anaphylaxis     [place], guacamole    Argan Kernal Oil (Argan[place] Spinosa) Hives    Naproxen Swelling    Percocet [Oxycodone-Acetaminophen] Itching       Patient Active Problem List    Diagnosis Date Noted    Acute systolic heart failure ([address] 10/16/2020    Cardiomegaly 10/15/2020    Type 2 diabetes with nephropathy ([facility] 12/17/2019    Asthma     Obesity, morbid ([facility] 11/27/2017    Controlled type 2 diabetes mellitus without complication, without long-term current use of insulin ([facility] 06/29/2017    Nasal congestion 07/16/2015    Chronic back pain 07/16/2015    Anxiety 07/16/2015     Past Medical History:   Diagnosis Date    ASCUS (atypical squamous cells of undetermined significance) on Pap smear 3/11    Asthma     Bipolar disorder ([facility]     Chlamydia      \"    Chronic back pain     COVID-19 02/09/2021    Depression     Diabetes mellitus ([facility] 01/02/2017    Elevated blood sugar     GC (gonococcus infection) 2007/ 2004    Heart failure ([facility]     HPV (human papilloma virus) infection 3/11    Irregular menses     Schizophrenia ([facility]     Sleep apnea 2/14    Smoker     Suicide attempt ([facility] 5/08    with tylenol    Suicide attempt ([facility] 10/09    Trichomonal vaginitis 8/10    Uterine fibroid        Review of Systems   Constitutional: Negative for chills and fever. Respiratory: Positive for cough, shortness of breath and wheezing. Cardiovascular: Negative for chest pain and palpitations. Objective: There were no vitals taken for this visit. General: alert, cooperative, no distress   Mental  status: normal mood, behavior, speech, dress, motor activity, and thought processes, able to follow commands   HENT: NCAT   Neck: no visualized mass   Resp: no respiratory distress. She is able to take deep breaths easily without audible wheezes at this time. Voice is clear and she has full sentences. No coughing noted at this time   Neuro: no gross deficits   Skin: no discoloration or lesions of concern on visible areas   Psychiatric: normal affect, consistent with stated mood, no evidence of hallucinations     Additional exam findings: We discussed the expected course, resolution and complications of the diagnosis(es) in detail. Medication risks, benefits, costs, interactions, and alternatives were discussed as indicated.   I advised her to contact the office if her condition worsens, changes or fails to improve as anticipated. She expressed understanding with the diagnosis(es) and plan. Randal Hauser is a 27 y.o. female who was evaluated by a video visit encounter for concerns as above. Patient identification was verified prior to start of the visit. A caregiver was present when appropriate. Due to this being a TeleHealth encounter (During [place]-50 public health emergency), evaluation of the following organ systems was limited: Vitals/Constitutional/EENT/Resp/CV/GI//MS/Neuro/Skin/Heme-Lymph-Imm. Pursuant to the emergency declaration under the [facility] waiver authority and the [employer] Act, this Virtual  Visit was conducted, with patient's (and/or legal guardian's) consent, to reduce the patient's risk of exposure to COVID-19 and provide necessary medical care. Services were provided through a video synchronous discussion virtually to substitute for in-person clinic visit. Patient and provider were located at their individual homes.       Candance Bode, MD

## 2021-02-26 NOTE — PROGRESS NOTES
[phone]    Carmen Blackwood presents today for   Chief Complaint   Patient presents with    Breathing Problem     Asthma flare up x 3 days. States she can't go to the ER because she wear a mask.  Concern For COVID-19 (Coronavirus)     + on 2/10/21 and + again on 2/19/21       Carmen Blackwood preferred language for health care discussion is english/other. Depression Screening:  3 most recent [facility] Screens 2/26/2021 10/21/2020 6/25/2018 2/14/2018 11/21/2017 6/29/2017 1/30/2017   PHQ Not Done - - - - - Active Diagnosis of Depression or Bipolar Disorder Active Diagnosis of Depression or Bipolar Disorder   Little interest or pleasure in doing things Not at all Not at all Several days Not at all Not at all Not at all -   Feeling down, depressed, irritable, or hopeless Not at all Not at all Several days Not at all Not at all More than half the days -   Total Score PHQ 2 0 0 2 0 0 2 -       Learning Assessment:  Learning Assessment 4/27/2015   PRIMARY LEARNER Patient   HIGHEST LEVEL OF EDUCATION - PRIMARY LEARNER  DID NOT GRADUATE HIGH SCHOOL   BARRIERS PRIMARY LEARNER NONE   CO-LEARNER CAREGIVER No   PRIMARY LANGUAGE ENGLISH   LEARNER PREFERENCE PRIMARY READING   ANSWERED BY patient   RELATIONSHIP SELF       Abuse Screening:  Abuse Screening Questionnaire 6/25/2018 4/27/2015   Do you ever feel afraid of your partner? N N   Are you in a relationship with someone who physically or mentally threatens you? N N   Is it safe for you to go home? Y Y       Fall Risk  No flowsheet data found. Health Maintenance reviewed and discussed per provider.  Yes    Carmen Blackwood is due for   Health Maintenance Due   Topic Date Due    Pneumococcal 0-64 years (1 of 1 - PPSV23) 04/22/1996    Eye Exam Retinal or Dilated  04/22/2000    COVID-19 Vaccine (1 of 2) 04/22/2006    Medicare Yearly Exam  06/26/2019    Flu Vaccine (1) 09/01/2020    A1C test (Diabetic or Prediabetic)  01/17/2021         Please order/place referral if appropriate. Advance Directive:  1. Do you have an advance directive in place? Patient Reply:NO    2. If not, would you like material regarding how to put one in place? Patient Reply: NO    Coordination of Care:  1. Have you been to the ER, urgent care clinic since your last visit? Hospitalized since your last visit? YES-[facility] [place]10[place]    2. Have you seen or consulted any other health care providers outside of the [address] [place] [address] since your last visit? Include any pap smears or colon screening.  NO

## 2021-03-19 ENCOUNTER — OFFICE VISIT (OUTPATIENT)
Dept: CARDIOLOGY CLINIC | Age: 31
End: 2021-03-19
Payer: MEDICARE

## 2021-03-19 VITALS
HEIGHT: 61 IN | TEMPERATURE: 97.9 F | BODY MASS INDEX: 49.12 KG/M2 | WEIGHT: 260.2 LBS | OXYGEN SATURATION: 98 % | SYSTOLIC BLOOD PRESSURE: 125 MMHG | HEART RATE: 89 BPM | RESPIRATION RATE: 16 BRPM | DIASTOLIC BLOOD PRESSURE: 83 MMHG

## 2021-03-19 DIAGNOSIS — R06.02 SHORTNESS OF BREATH: ICD-10-CM

## 2021-03-19 DIAGNOSIS — F41.9 ANXIETY: Chronic | ICD-10-CM

## 2021-03-19 DIAGNOSIS — Z72.0 TOBACCO ABUSE: ICD-10-CM

## 2021-03-19 DIAGNOSIS — I42.0 DILATED CARDIOMYOPATHY (HCC): ICD-10-CM

## 2021-03-19 DIAGNOSIS — E11.21 TYPE 2 DIABETES WITH NEPHROPATHY (HCC): Primary | ICD-10-CM

## 2021-03-19 PROCEDURE — 99204 OFFICE O/P NEW MOD 45 MIN: CPT | Performed by: INTERNAL MEDICINE

## 2021-03-19 PROCEDURE — G8510 SCR DEP NEG, NO PLAN REQD: HCPCS | Performed by: INTERNAL MEDICINE

## 2021-03-19 PROCEDURE — G8427 DOCREV CUR MEDS BY ELIG CLIN: HCPCS | Performed by: INTERNAL MEDICINE

## 2021-03-19 PROCEDURE — G8417 CALC BMI ABV UP PARAM F/U: HCPCS | Performed by: INTERNAL MEDICINE

## 2021-03-19 PROCEDURE — 2022F DILAT RTA XM EVC RTNOPTHY: CPT | Performed by: INTERNAL MEDICINE

## 2021-03-19 PROCEDURE — 3046F HEMOGLOBIN A1C LEVEL >9.0%: CPT | Performed by: INTERNAL MEDICINE

## 2021-03-19 NOTE — PROGRESS NOTES
Muna Orta presents today for   Chief Complaint   Patient presents with   • New Patient     cardiomyopathy       Muna Orta preferred language for health care discussion is english/other.    Personal Protective Equipment:   Personal Protective Equipment was used including: mask-surgical and hands-gloves. Patient was placed on no precaution(s). Patient was masked.    Is someone accompanying this pt? no    Is the patient using any DME equipment during OV? mo    Depression Screening:  3 most recent PHQ Screens 3/19/2021   PHQ Not Done -   Little interest or pleasure in doing things Not at all   Feeling down, depressed, irritable, or hopeless Not at all   Total Score PHQ 2 0       Learning Assessment:  Learning Assessment 4/27/2015   PRIMARY LEARNER Patient   HIGHEST LEVEL OF EDUCATION - PRIMARY LEARNER  DID NOT GRADUATE HIGH SCHOOL   BARRIERS PRIMARY LEARNER NONE   CO-LEARNER CAREGIVER No   PRIMARY LANGUAGE ENGLISH   LEARNER PREFERENCE PRIMARY READING   ANSWERED BY patient   RELATIONSHIP SELF       Abuse Screening:  Abuse Screening Questionnaire 6/25/2018   Do you ever feel afraid of your partner? N   Are you in a relationship with someone who physically or mentally threatens you? N   Is it safe for you to go home? Y       Fall Risk  No flowsheet data found.    Pt currently taking Anticoagulant therapy? no    Coordination of Care:  1. Have you been to the ER, urgent care clinic since your last visit? Hospitalized since your last visit? no    2. Have you seen or consulted any other health care providers outside of the [facility] since your last visit? Include any pap smears or colon screening. no

## 2021-03-19 NOTE — TELEPHONE ENCOUNTER
PCP: Ilene Meckel, MD    Last appt: 3/19/2021  Future Appointments   Date Time Provider Janie Lara   4/14/2021  1:45 PM Camille Nayak MD [facility] BS AMB       Requested Prescriptions     Pending Prescriptions Disp Refills    sacubitril-valsartan (ENTRESTO) 24 mg/26 mg tablet 60 Tab 1     Sig: Take 1 Tab by mouth every twelve (12) hours.

## 2021-03-23 ENCOUNTER — PATIENT OUTREACH (OUTPATIENT)
Dept: CASE MANAGEMENT | Age: 31
End: 2021-03-23

## 2021-03-23 PROBLEM — R06.02 SHORTNESS OF BREATH: Status: ACTIVE | Noted: 2020-08-29

## 2021-03-23 NOTE — PROGRESS NOTES
.  Complex Case Management      Date/Time:  3/23/2021 12:56 PM    Method of communication with patient:phone    [facility] ([facility]) contacted the patient by telephone to perform Ambulatory Care Coordination. Verified name and  (PHI) with patient as identifiers. Provided introduction to self, and explanation of the Ambulatory Care Manager's role. Reviewed most recent clinic visit w/ patient who verbalized understanding. Patient given an opportunity to ask questions. Top Challenges reviewed with the patient   1. Patient verbalizing need in office appointment for her Wellness visit>>> AC will route note to provider  2. Patient needs up dated labs>>> Last were 10/17/2020 ( HGA1C 10.8)  3. Patient needs ACP discussion     The patient agrees to contact the PCP office or the [facility] for questions related to their healthcare. Provided contact information for future reference. Disease Specific:   DM2,Asthma, CHF    Home Health Active: No    DME Active: Yes,nebulizer and CPAP machine    Barriers to care? Needs ACP discussion    Advance Care Planning:   Does patient have an Advance Directive:  not on file     Medication(s):   Medication reconciliation was performed with patient, who verbalizes understanding of administration of home medications. There were no barriers to obtaining medications identified at this time. Referral to Pharm D needed: no     Current Outpatient Medications   Medication Sig    sacubitril-valsartan (ENTRESTO) 24 mg/26 mg tablet Take 1 Tab by mouth every twelve (12) hours.  HYDROcodone-acetaminophen (NORCO) 5-325 mg per tablet Take 1 Tab by mouth every six (6) hours as needed for Pain for up to 30 days. Max Daily Amount: 4 Tabs.  albuterol (PROVENTIL HFA, VENTOLIN HFA, PROAIR HFA) 90 mcg/actuation inhaler Take 2 Puffs by inhalation every six (6) hours as needed for Wheezing.     fluticasone propionate (Flovent HFA) 110 mcg/actuation inhaler Take 1 Puff by inhalation every twelve (12) hours.  predniSONE (DELTASONE) 20 mg tablet Take 3 tablets by mouth for three days, then 2 for 3 days, 1 for 3 days    metFORMIN (GLUCOPHAGE) 500 mg tablet take 1 tablet by mouth twice a day with meals    empagliflozin (JARDIANCE) 25 mg tablet Take 1 Tab by mouth daily.  aspirin delayed-release 81 mg tablet Take 1 Tab by mouth daily.  atorvastatin (LIPITOR) 40 mg tablet Take 1 Tab by mouth nightly.  carvediloL (COREG) 12.5 mg tablet Take 1 Tab by mouth two (2) times daily (with meals).  ergocalciferol (ERGOCALCIFEROL) 1,250 mcg (50,000 unit) capsule Take 1 Cap by mouth every seven (7) days.  insulin lispro protamin-lispro (HUMALOG 75-25 MIX) flexpen 30 Units by SubCUTAneous route Before breakfast and dinner.  fluticasone propionate (FLONASE) 50 mcg/actuation nasal spray 2 Sprays by Both Nostrils route daily.  buPROPion SR (WELLBUTRIN SR) 150 mg SR tablet Take 1 Tab by mouth two (2) times a day.  loratadine (CLARITIN REDITABS) 10 mg dissolvable tablet Take 1 Tab by mouth daily.  albuterol (PROVENTIL VENTOLIN) 2.5 mg /3 mL (0.083 %) nebu 3 mL by Nebulization route three (3) times daily as needed (wheezing). Indications: bronchospasm    famotidine (PEPCID) 20 mg tablet Take 1 Tab by mouth daily. (Patient taking differently: Take 20 mg by mouth as needed.)    diphenhydrAMINE (BENADRYL) 25 mg capsule 25 mg every six (6) hours as needed. No current facility-administered medications for this visit.         [facility] follow up appointment(s):   Future Appointments   Date Time Provider Janie Lara   4/14/2021  1:45 PM Fox Moran MD [facility] BS AMB        Non-[facility] follow up appointment(s): No    Goals Addressed                 This Visit's Progress       Asthma     Understand ASTHMA action plan. (ie. when to seek medical care, understanding green, yellow, and red zones, how and when to adjust asthma treatment)        When to use nebulizer  Correctly use inhalers  CPAP at HS         Chronic Disease     Coordinate pain management plan for patient. Monitor back pains  Exercise to reduce exacerbation  Compliant with pain medications         Diabetes     Patient verbalizes understanding of self -management goals of living with Diabetes. atient will attend all physician appointments as scheduled    DM  Patient will begin checking blood glucose at least 2x week  Patient will continue home exercise program  Patient will follow ADA diet recommendations    Weight Loss  Patient will continue home exercise program  Patient will force fluids to 6 - 8 glasses per daily to prevent dehydration  Patient will eat healthy diet, high in vegetables and fruits, limit starch intake         General     Prevent Diabetes worsening        Keep Schedule appointments  Eye examines  Foot examines  Keep Blood sugars below 150   Be compliant with diabetic medications  Maintain DM diet ( No sweets, high Veg and water)  Exercise /walk daily           Heart Failure     Reduce risk of CHF exacerbations and complications. Keep cardiology appointments( Dr Cassandra Bah)  Complaint with Medication [employer])  Follow up with cariology procedures( Stress test, echo, ekg) as scheduled   Monitor increase in weight with SOB ( potential CHF) wt daily          . Patient is currently enrolled in Care Management Episode     Start day 3/23/2021   Risk for Admission or ED Visit high   Please see patient outreach encounters for further details.  For questions contact Nataly Koenig.  Brendon Trevino RN, [phone]

## 2021-03-24 PROBLEM — Z72.0 TOBACCO ABUSE: Status: ACTIVE | Noted: 2021-03-24

## 2021-03-24 PROBLEM — I42.0 DILATED CARDIOMYOPATHY (HCC): Status: ACTIVE | Noted: 2021-03-24

## 2021-03-24 NOTE — PROGRESS NOTES
Subjective:      Muna is in the office today for cardiac evaluation. She is a 80-year-old diabetic woman with history of systolic heart failure. Her last echocardiogram was done in October 2020 which demonstrate ejection fraction of 38%. She also grade 1 diastolic dysfunction. She was hospitalized at [facility] for acute CHF in the past. She was seen in the [facility] ED on 2/9/2021. At that time she presented with  chest pain and a cough. She was evaluated and discharged on antibiotics and steroids and felt to have bronchitis at that time. Patient had a previous cardiac CTA which did not demonstrate any significant obstructive coronary disease. There is limited data available at present. In the office today, she reports she had \"had asthma \"all my life \". She has had no recent shortness of breath. She has had no PND or orthopnea. She has had no peripheral swelling. She can easily walk a block or up a flight of steps without limiting dyspnea. She took Entresto for 60 days but her prescription ran out, she did not arrange for any more medicine. Patient's cardiac risk factors are smoking/ tobacco exposure, diabetes mellitus. Patient Active Problem List    Diagnosis Date Noted    Dilated cardiomyopathy ([facility] 03/24/2021    Tobacco abuse 25/84/2489    Acute systolic heart failure ([facility] 10/16/2020    Shortness of breath 08/29/2020    Type 2 diabetes with nephropathy ([facility] 12/17/2019    Asthma     Obesity, morbid ([facility] 11/27/2017    Controlled type 2 diabetes mellitus without complication, without long-term current use of insulin ([facility]) 06/29/2017    Nasal congestion 07/16/2015    Chronic back pain 07/16/2015    Anxiety 07/16/2015     Current Outpatient Medications   Medication Sig Dispense Refill    HYDROcodone-acetaminophen (NORCO) 5-325 mg per tablet Take 1 Tab by mouth every six (6) hours as needed for Pain for up to 30 days. Max Daily Amount: 4 Tabs.  120 Tab 0    albuterol (PROVENTIL HFA, VENTOLIN HFA, PROAIR HFA) 90 mcg/actuation inhaler Take 2 Puffs by inhalation every six (6) hours as needed for Wheezing. 18 g 5    fluticasone propionate (Flovent HFA) 110 mcg/actuation inhaler Take 1 Puff by inhalation every twelve (12) hours. 1 Inhaler 5    predniSONE (DELTASONE) 20 mg tablet Take 3 tablets by mouth for three days, then 2 for 3 days, 1 for 3 days 18 Tab 0    metFORMIN (GLUCOPHAGE) 500 mg tablet take 1 tablet by mouth twice a day with meals 60 Tab 5    empagliflozin (JARDIANCE) 25 mg tablet Take 1 Tab by mouth daily. 30 Tab 5    aspirin delayed-release 81 mg tablet Take 1 Tab by mouth daily. 100 Tab 5    atorvastatin (LIPITOR) 40 mg tablet Take 1 Tab by mouth nightly. 30 Tab 5    carvediloL (COREG) 12.5 mg tablet Take 1 Tab by mouth two (2) times daily (with meals). 60 Tab 5    ergocalciferol (ERGOCALCIFEROL) 1,250 mcg (50,000 unit) capsule Take 1 Cap by mouth every seven (7) days. 4 Cap 5    insulin lispro protamin-lispro (HUMALOG 75-25 MIX) flexpen 30 Units by SubCUTAneous route Before breakfast and dinner. 5 Pen 5    fluticasone propionate (FLONASE) 50 mcg/actuation nasal spray 2 Sprays by Both Nostrils route daily. 1 Bottle 5    buPROPion SR (WELLBUTRIN SR) 150 mg SR tablet Take 1 Tab by mouth two (2) times a day. 60 Tab 5    loratadine (CLARITIN REDITABS) 10 mg dissolvable tablet Take 1 Tab by mouth daily. 30 Tab 5    albuterol (PROVENTIL VENTOLIN) 2.5 mg /3 mL (0.083 %) nebu 3 mL by Nebulization route three (3) times daily as needed (wheezing). Indications: bronchospasm 100 Each 5    famotidine (PEPCID) 20 mg tablet Take 1 Tab by mouth daily. (Patient taking differently: Take 20 mg by mouth as needed.) 90 Tab 5    diphenhydrAMINE (BENADRYL) 25 mg capsule 25 mg every six (6) hours as needed.  sacubitril-valsartan (ENTRESTO) 24 mg/26 mg tablet Take 1 Tab by mouth every twelve (12) hours.  60 Tab 1     Allergies   Allergen Reactions    Other Food Anaphylaxis [place], guacamole    Argan Kernal Oil (Arganina Spinosa) Hives    Naproxen Swelling    Percocet [Oxycodone-Acetaminophen] Itching     Past Medical History:   Diagnosis Date    ASCUS (atypical squamous cells of undetermined significance) on Pap smear 3/11    Asthma     Bipolar disorder ([facility]     Chlamydia      \"    Chronic back pain     COVID-19 02/09/2021    Depression     Diabetes mellitus ([facility] 01/02/2017    Elevated blood sugar     GC (gonococcus infection) 2007/ 2004    Heart failure ([facility]     HPV (human papilloma virus) infection 3/11    Irregular menses     Schizophrenia ([facility]     Sleep apnea 2/14    Smoker     Suicide attempt ([facility] 5/08    with tylenol    Suicide attempt ([facility] 10/09    Trichomonal vaginitis 8/10    Uterine fibroid      No past surgical history on file.   Family History   Problem Relation Age of Onset    Attention Deficit Hyperactivity Disorder Brother     Bipolar Disorder Brother     Seizures Sister     Hypertension Sister     Other Sister         substance abuse     Social History     Tobacco Use   Smoking Status Current Every Day Smoker    Packs/day: 0.25    Years: 9.00    Pack years: 2.25   Smokeless Tobacco Never Used   Tobacco Comment    unable/unwilling to quit at this time          Review of Systems, additional:  Constitutional: negative  Eyes: negative  Respiratory: positive for wheezing  Cardiovascular: negative  Gastrointestinal: negative  Musculoskeletal:negative  Neurological: negative  Behvioral/Psych: negative  Endocrine: negative  ENT: negative    Objective:     Visit Vitals  /83 (BP 1 Location: Right upper arm, BP Patient Position: Sitting, BP Cuff Size: Adult)   Pulse 89   Temp 97.9 °F (36.6 °C) (Temporal)   Resp 16   Ht 5' 1\" (1.549 m)   Wt 260 lb 3.2 oz (118 kg)   SpO2 98%   BMI 49.16 kg/m²     General:  alert, cooperative, no distress   Chest Wall: inspection normal - no chest wall deformities or tenderness, respiratory effort normal Lung: clear to auscultation bilaterally   Heart:  normal rate and regular rhythm, S1 and S2 normal, no murmurs noted, no gallops noted, no JVD   Abdomen: soft, non-tender. Bowel sounds normal. No masses,  no organomegaly   Extremities: extremities normal, atraumatic, no cyanosis or edema Skin: no rashes   Neuro: alert, oriented, normal speech, no focal findings or movement disorder noted     EK/15/2020. Normal sinus rhythm. Normal.    Assessment/Plan:       ICD-10-CM ICD-9-CM    1. Type 2 diabetes with nephropathy (HCC)  E11.21 250.40      583.81    2. Tobacco abuse  Z72.0 305.1    3. Dilated cardiomyopathy ([facility] , last echo 10/16/2020. EF 38%. Moderate global hypokinesis. Stage I diastolic dysfunction. Restart Entresto  twice daily. Return in 1 month. I42.0 425.4    4. Anxiety  F41.9 300.00    5.  Shortness of breath  R06.02 786.05

## 2021-03-30 ENCOUNTER — PATIENT OUTREACH (OUTPATIENT)
Dept: CASE MANAGEMENT | Age: 31
End: 2021-03-30

## 2021-03-30 NOTE — PROGRESS NOTES
.Complex Case Management  Follow-Up       Date/Time:   3/30/2021  8:50 AM       Ambulatory Care Manager ( ACM) contacted patient for Complex Case Management  follow up. Spoke to patient  Introduced self/role and reason for call. Patient reported:  Doing well. Pertinent concerns:Still no call from office for her wellness visit. Has f/u with cardiologist for 4/14/2021 @ 1:45 PM. ACM instructed patient to call Dr Elmo Olszewski office as well and set up her AWV including labs. State's she would. Specialist appointments since last outreach? No   If so, specialist and date: N/A    Next PCP Appointment: None noted    Medications:     New medications since last outreach: No  Does patient need refills on any medications: No   Medication changes since last outreach (dose adjustments or discontinued meds): No      Home Health company: N/A      Patient reminded that there are physicians on call 24 hours a day / 7 days a week (M-F 5pm to 8am and from Friday 5pm until Monday 8a for the weekend) should the patient have questions or concerns.

## 2021-04-12 ENCOUNTER — PATIENT OUTREACH (OUTPATIENT)
Dept: CASE MANAGEMENT | Age: 31
End: 2021-04-12

## 2021-04-14 ENCOUNTER — OFFICE VISIT (OUTPATIENT)
Dept: CARDIOLOGY CLINIC | Age: 31
End: 2021-04-14
Payer: MEDICARE

## 2021-04-14 VITALS
DIASTOLIC BLOOD PRESSURE: 76 MMHG | SYSTOLIC BLOOD PRESSURE: 100 MMHG | TEMPERATURE: 97.7 F | RESPIRATION RATE: 18 BRPM | HEIGHT: 61 IN | WEIGHT: 255.8 LBS | OXYGEN SATURATION: 98 % | HEART RATE: 83 BPM | BODY MASS INDEX: 48.3 KG/M2

## 2021-04-14 DIAGNOSIS — I42.0 DILATED CARDIOMYOPATHY (HCC): Primary | ICD-10-CM

## 2021-04-14 PROCEDURE — G8417 CALC BMI ABV UP PARAM F/U: HCPCS | Performed by: INTERNAL MEDICINE

## 2021-04-14 PROCEDURE — G8510 SCR DEP NEG, NO PLAN REQD: HCPCS | Performed by: INTERNAL MEDICINE

## 2021-04-14 PROCEDURE — G8427 DOCREV CUR MEDS BY ELIG CLIN: HCPCS | Performed by: INTERNAL MEDICINE

## 2021-04-14 PROCEDURE — 99214 OFFICE O/P EST MOD 30 MIN: CPT | Performed by: INTERNAL MEDICINE

## 2021-04-14 NOTE — PROGRESS NOTES
Jack Deaner presents today for   Chief Complaint   Patient presents with    Follow-up     4 weeks       Muna Orta preferred language for health care discussion is english/other. Personal Protective Equipment:   Personal Protective Equipment was used including: mask-surgical and hands-gloves. Patient was placed on no precaution(s). Patient was masked. Precautions:   Patient currently on None  Patient currently roomed with door closed    Is someone accompanying this pt? no    Is the patient using any DME equipment during [address]? no    Depression Screening:  3 most recent PHQ Screens 4/14/2021   PHQ Not Done -   Little interest or pleasure in doing things Not at all   Feeling down, depressed, irritable, or hopeless Not at all   Total Score PHQ 2 0       Learning Assessment:  Learning Assessment 4/27/2015   PRIMARY LEARNER Patient   HIGHEST LEVEL OF EDUCATION - PRIMARY LEARNER  DID NOT GRADUATE HIGH SCHOOL   BARRIERS PRIMARY LEARNER NONE   CO-LEARNER CAREGIVER No   PRIMARY LANGUAGE ENGLISH   LEARNER PREFERENCE PRIMARY READING   ANSWERED BY patient   RELATIONSHIP SELF       Abuse Screening:  Abuse Screening Questionnaire 6/25/2018   Do you ever feel afraid of your partner? N   Are you in a relationship with someone who physically or mentally threatens you? N   Is it safe for you to go home? Y       Fall Risk  No flowsheet data found. Pt currently taking Anticoagulant therapy? no    Coordination of Care:  1. Have you been to the ER, urgent care clinic since your last visit? Hospitalized since your last visit? no    2. Have you seen or consulted any other health care providers outside of the [address] [place] [address] since your last visit? Include any pap smears or colon screening.  no

## 2021-04-14 NOTE — TELEPHONE ENCOUNTER
PCP: Jessika Harris MD    Last appt: 4/14/2021  No future appointments. Requested Prescriptions     Pending Prescriptions Disp Refills    carvediloL (COREG) 6.25 mg tablet 180 Tab 3     Sig: Take 1 Tab by mouth two (2) times daily (with meals).  sacubitriL-valsartan (Entresto) 49-51 mg tab tablet 60 Tab 5     Sig: Take 1 Tab by mouth two (2) times a day.            Other Comments:  Dosage change on entresto and coreg

## 2021-04-16 RX ORDER — SACUBITRIL AND VALSARTAN 49; 51 MG/1; MG/1
1 TABLET, FILM COATED ORAL 2 TIMES DAILY
Qty: 60 TAB | Refills: 5 | Status: SHIPPED | OUTPATIENT
Start: 2021-04-16 | End: 2022-06-07

## 2021-04-16 RX ORDER — CARVEDILOL 6.25 MG/1
6.25 TABLET ORAL 2 TIMES DAILY WITH MEALS
Qty: 180 TAB | Refills: 3 | Status: SHIPPED | OUTPATIENT
Start: 2021-04-16 | End: 2022-04-25

## 2021-04-19 ENCOUNTER — TELEPHONE (OUTPATIENT)
Dept: INTERNAL MEDICINE CLINIC | Age: 31
End: 2021-04-19

## 2021-04-19 DIAGNOSIS — G89.29 CHRONIC LOW BACK PAIN WITHOUT SCIATICA, UNSPECIFIED BACK PAIN LATERALITY: ICD-10-CM

## 2021-04-19 DIAGNOSIS — Z76.0 MEDICATION REFILL: ICD-10-CM

## 2021-04-19 DIAGNOSIS — M54.50 CHRONIC LOW BACK PAIN WITHOUT SCIATICA, UNSPECIFIED BACK PAIN LATERALITY: ICD-10-CM

## 2021-04-19 DIAGNOSIS — Z79.891 LONG TERM (CURRENT) USE OF OPIATE ANALGESIC: ICD-10-CM

## 2021-04-19 RX ORDER — ERGOCALCIFEROL 1.25 MG/1
CAPSULE ORAL
Qty: 4 CAP | Refills: 5 | Status: SHIPPED | OUTPATIENT
Start: 2021-04-19 | End: 2022-02-17 | Stop reason: SDUPTHER

## 2021-04-19 RX ORDER — HYDROCODONE BITARTRATE AND ACETAMINOPHEN 5; 325 MG/1; MG/1
1 TABLET ORAL
Qty: 120 TAB | Refills: 0 | Status: SHIPPED | OUTPATIENT
Start: 2021-04-19 | End: 2021-05-17 | Stop reason: SDUPTHER

## 2021-04-19 NOTE — TELEPHONE ENCOUNTER
hydrocodone-Acetaminophen 5/325 Rx #: [id number]    PA Case: [id number], Status: Approved, Coverage Starts on: 4/1/2021 12:00:00 AM, Coverage Ends on: 4/19/2022 12:00:00 AM.

## 2021-04-23 NOTE — PROGRESS NOTES
Subjective:      Muna is in the office today for cardiac re evaluation. She is a 66-year-old diabetic woman with history of systolic heart failure. Her last echocardiogram was done in October 2020 which demonstrated an ejection fraction of 38%. She also grade 1 diastolic dysfunction. She was hospitalized at [facility] for acute CHF in the past. She was seen in the [facility] ED on 2/9/2021. At that time she presented with  chest pain and a cough. She was evaluated and discharged on antibiotics and steroids and felt to have bronchitis at that time. Patient had a previous cardiac CTA which did not demonstrate any significant obstructive coronary disease. There is limited data available at present. In the office today, she reports she had \"had asthma \"all my life \". She has had no recent shortness of breath. She has had no PND or orthopnea. She has had no peripheral swelling. She can easily walk a block or up a flight of steps without limiting dyspnea. She took Entresto for 60 days but her prescription ran out, and she did not arrange for getting more medicine. Her Bhandari-Camarena was restarted at the last visit. Her breathing is \"okay \". She does not feel that her feet swell quite as much as they have in the past.  She has had no chest pain. Patient's cardiac risk factors are smoking/ tobacco exposure, diabetes mellitus.         Patient Active Problem List    Diagnosis Date Noted    Dilated cardiomyopathy ([facility] 03/24/2021    Tobacco abuse 37/89/0807    Acute systolic heart failure ([facility] 10/16/2020    Shortness of breath 08/29/2020    Type 2 diabetes with nephropathy ([address] 12/17/2019    Asthma     Obesity, morbid ([address] 11/27/2017    Controlled type 2 diabetes mellitus without complication, without long-term current use of insulin ([address] 06/29/2017    Nasal congestion 07/16/2015    Chronic back pain 07/16/2015    Anxiety 07/16/2015     Current Outpatient Medications   Medication Sig Dispense Refill  albuterol (PROVENTIL HFA, VENTOLIN HFA, PROAIR HFA) 90 mcg/actuation inhaler Take 2 Puffs by inhalation every six (6) hours as needed for Wheezing. 18 g 5    fluticasone propionate (Flovent HFA) 110 mcg/actuation inhaler Take 1 Puff by inhalation every twelve (12) hours. 1 Inhaler 5    predniSONE (DELTASONE) 20 mg tablet Take 3 tablets by mouth for three days, then 2 for 3 days, 1 for 3 days 18 Tab 0    metFORMIN (GLUCOPHAGE) 500 mg tablet take 1 tablet by mouth twice a day with meals 60 Tab 5    empagliflozin (JARDIANCE) 25 mg tablet Take 1 Tab by mouth daily. 30 Tab 5    aspirin delayed-release 81 mg tablet Take 1 Tab by mouth daily. 100 Tab 5    atorvastatin (LIPITOR) 40 mg tablet Take 1 Tab by mouth nightly. 30 Tab 5    insulin lispro protamin-lispro (HUMALOG 75-25 MIX) flexpen 30 Units by SubCUTAneous route Before breakfast and dinner. 5 Pen 5    fluticasone propionate (FLONASE) 50 mcg/actuation nasal spray 2 Sprays by Both Nostrils route daily. 1 Bottle 5    buPROPion SR (WELLBUTRIN SR) 150 mg SR tablet Take 1 Tab by mouth two (2) times a day. 60 Tab 5    loratadine (CLARITIN REDITABS) 10 mg dissolvable tablet Take 1 Tab by mouth daily. 30 Tab 5    albuterol (PROVENTIL VENTOLIN) 2.5 mg /3 mL (0.083 %) nebu 3 mL by Nebulization route three (3) times daily as needed (wheezing). Indications: bronchospasm 100 Each 5    famotidine (PEPCID) 20 mg tablet Take 1 Tab by mouth daily. (Patient taking differently: Take 20 mg by mouth as needed.) 90 Tab 5    diphenhydrAMINE (BENADRYL) 25 mg capsule 25 mg every six (6) hours as needed.  HYDROcodone-acetaminophen (NORCO) 5-325 mg per tablet Take 1 Tab by mouth every six (6) hours as needed for Pain for up to 30 days. Max Daily Amount: 4 Tabs. 120 Tab 0    Vitamin D2 1,250 mcg (50,000 unit) capsule take 1 capsule by mouth every week 4 Cap 5    carvediloL (COREG) 6.25 mg tablet Take 1 Tab by mouth two (2) times daily (with meals).  180 Tab 3    sacubitriL-valsartan (Entresto) 49-51 mg tab tablet Take 1 Tab by mouth two (2) times a day. 60 Tab 5     Allergies   Allergen Reactions    Other Food Anaphylaxis     [place], guacamole    Argan Kernal Oil (Arganina Spinosa) Hives    Naproxen Swelling    Percocet [Oxycodone-Acetaminophen] Itching     Past Medical History:   Diagnosis Date    ASCUS (atypical squamous cells of undetermined significance) on Pap smear 3/11    Asthma     Bipolar disorder ([facility]     Chlamydia      \"    Chronic back pain     COVID-19 02/09/2021    Depression     Diabetes mellitus ([facility] 01/02/2017    Elevated blood sugar     GC (gonococcus infection) 2007/ 2004    Heart failure ([facility]     HPV (human papilloma virus) infection 3/11    Irregular menses     Schizophrenia ([facility]     Sleep apnea 2/14    Smoker     Suicide attempt ([facility] 5/08    with tylenol    [facility] 10/09    Trichomonal vaginitis 8/10    Uterine fibroid      No past surgical history on file.   Family History   Problem Relation Age of Onset    Attention Deficit Hyperactivity Disorder Brother     Bipolar Disorder Brother     Seizures Sister     Hypertension Sister     Other Sister         substance abuse     Social History     Tobacco Use   Smoking Status Current Every Day Smoker    Packs/day: 0.25    Years: 9.00    Pack years: 2.25   Smokeless Tobacco Never Used   Tobacco Comment    unable/unwilling to quit at this time          Review of Systems, additional:  Constitutional: negative  Eyes: negative  Respiratory: positive for wheezing  Cardiovascular: negative  Gastrointestinal: negative  Musculoskeletal:negative  Neurological: negative  Behvioral/Psych: negative  Endocrine: negative  ENT: negative    Objective:     Visit Vitals  /76 (BP 1 Location: Left upper arm, BP Patient Position: Sitting, BP Cuff Size: Adult)   Pulse 83   Temp 97.7 °F (36.5 °C) (Temporal)   Resp 18   Ht 5' 1\" (1.549 m)   Wt 255 lb 12.8 oz (116 kg)   SpO2 98% BMI 48.33 kg/m²     General:  alert, cooperative, no distress   Chest Wall: inspection normal - no chest wall deformities or tenderness, respiratory effort normal   Lung: clear to auscultation bilaterally   Heart:  normal rate and regular rhythm, S1 and S2 normal, no murmurs noted, no gallops noted, no JVD   Abdomen: soft, non-tender. Bowel sounds normal. No masses,  no organomegaly   Extremities: extremities normal, atraumatic, no cyanosis or edema Skin: no rashes   Neuro: alert, oriented, normal speech, no focal findings or movement disorder noted     EK/15/2020. Normal sinus rhythm. Normal.    Assessment/Plan:       ICD-10-CM ICD-9-CM    1. Type 2 diabetes with nephropathy (HCC)  E11.21 250.40      583.81    2. Tobacco abuse  Z72.0 305.1    3. Dilated cardiomyopathy ([facility] , last echo 10/16/2020. EF 38%. Moderate global hypokinesis. Stage I diastolic dysfunction. Increase Entresto to 49/51 twice daily. Decrease carvedilol to 6.25 twice daily. Return in 2 months I42.0 425.4    4. Anxiety  F41.9 300.00    5.  Shortness of breath  R06.02 786.05

## 2021-04-28 ENCOUNTER — PATIENT OUTREACH (OUTPATIENT)
Dept: CASE MANAGEMENT | Age: 31
End: 2021-04-28

## 2021-05-13 ENCOUNTER — TELEPHONE (OUTPATIENT)
Dept: INTERNAL MEDICINE CLINIC | Age: 31
End: 2021-05-13

## 2021-05-13 NOTE — TELEPHONE ENCOUNTER
Patient called in stating she gets frequent yeast infections and the OTC creams seem to make her symptoms worse. Asking if there is anything else she can take?

## 2021-05-14 NOTE — PROGRESS NOTES
.Date/Time:  4/28/2021 8:57 AM    Method of communication with patient:phone    [facility] ( [facility]) made second follow up out reach to  patient by telephone for Complex Case Management  ( CCM). Provided introduction to self, and explanation of the Ambulatory Care . Contact at [phone] anytime Monday thru Friday 08:00-4:30.

## 2021-05-14 NOTE — PROGRESS NOTES
.Date/Time:  4/12/2021 8:57 AM    Method of communication with patient:phone    [facility] ( [facility]) made follow up out reach to  patient by telephone for Complex Case Management  ( CCM). Provided introduction to self, and explanation of the Ambulatory Care . Contact at [phone] anytime Monday thru Friday 08:00-4:30.

## 2021-05-17 DIAGNOSIS — Z76.0 MEDICATION REFILL: ICD-10-CM

## 2021-05-17 DIAGNOSIS — G89.29 CHRONIC LOW BACK PAIN WITHOUT SCIATICA, UNSPECIFIED BACK PAIN LATERALITY: ICD-10-CM

## 2021-05-17 DIAGNOSIS — Z79.891 LONG TERM (CURRENT) USE OF OPIATE ANALGESIC: ICD-10-CM

## 2021-05-17 DIAGNOSIS — M54.50 CHRONIC LOW BACK PAIN WITHOUT SCIATICA, UNSPECIFIED BACK PAIN LATERALITY: ICD-10-CM

## 2021-05-17 RX ORDER — HYDROCODONE BITARTRATE AND ACETAMINOPHEN 5; 325 MG/1; MG/1
1 TABLET ORAL
Qty: 120 TAB | Refills: 0 | Status: SHIPPED | OUTPATIENT
Start: 2021-05-17 | End: 2021-06-16 | Stop reason: SDUPTHER

## 2021-05-17 NOTE — TELEPHONE ENCOUNTER
Please refill med. Requested Prescriptions     Pending Prescriptions Disp Refills    HYDROcodone-acetaminophen (NORCO) 5-325 mg per tablet 120 Tab 0     Sig: Take 1 Tab by mouth every six (6) hours as needed for Pain for up to 30 days. Max Daily Amount: 4 Tabs.

## 2021-05-18 ENCOUNTER — HOSPITAL ENCOUNTER (OUTPATIENT)
Dept: LAB | Age: 31
Discharge: HOME | End: 2021-05-18

## 2021-05-18 ENCOUNTER — OFFICE VISIT (OUTPATIENT)
Dept: INTERNAL MEDICINE CLINIC | Age: 31
End: 2021-05-18
Payer: MEDICARE

## 2021-05-18 VITALS
TEMPERATURE: 98.1 F | RESPIRATION RATE: 18 BRPM | WEIGHT: 259 LBS | DIASTOLIC BLOOD PRESSURE: 90 MMHG | SYSTOLIC BLOOD PRESSURE: 128 MMHG | BODY MASS INDEX: 48.9 KG/M2 | HEART RATE: 84 BPM | OXYGEN SATURATION: 97 % | HEIGHT: 61 IN

## 2021-05-18 DIAGNOSIS — E11.21 TYPE 2 DIABETES WITH NEPHROPATHY (HCC): ICD-10-CM

## 2021-05-18 DIAGNOSIS — N76.0 ACUTE VAGINITIS: Primary | ICD-10-CM

## 2021-05-18 LAB — XX-LABCORP SPECIMEN COL,LCBCF: NORMAL

## 2021-05-18 PROCEDURE — G8510 SCR DEP NEG, NO PLAN REQD: HCPCS | Performed by: INTERNAL MEDICINE

## 2021-05-18 PROCEDURE — 3046F HEMOGLOBIN A1C LEVEL >9.0%: CPT | Performed by: INTERNAL MEDICINE

## 2021-05-18 PROCEDURE — 99001 SPECIMEN HANDLING PT-LAB: CPT

## 2021-05-18 PROCEDURE — 2022F DILAT RTA XM EVC RTNOPTHY: CPT | Performed by: INTERNAL MEDICINE

## 2021-05-18 PROCEDURE — G8427 DOCREV CUR MEDS BY ELIG CLIN: HCPCS | Performed by: INTERNAL MEDICINE

## 2021-05-18 PROCEDURE — 99214 OFFICE O/P EST MOD 30 MIN: CPT | Performed by: INTERNAL MEDICINE

## 2021-05-18 PROCEDURE — G8417 CALC BMI ABV UP PARAM F/U: HCPCS | Performed by: INTERNAL MEDICINE

## 2021-05-18 RX ORDER — HYDROCORTISONE 1 %
CREAM (GRAM) TOPICAL
COMMUNITY
End: 2022-01-19 | Stop reason: ALTCHOICE

## 2021-05-18 RX ORDER — FLUCONAZOLE 200 MG/1
200 TABLET ORAL DAILY
Qty: 3 TAB | Refills: 2 | Status: SHIPPED | OUTPATIENT
Start: 2021-05-18 | End: 2021-08-17 | Stop reason: SDUPTHER

## 2021-05-18 RX ORDER — MICONAZOLE NITRATE 200 MG-2 %
COMBO PACK, PREFILLED APPL. AND CREAM VAGINAL
COMMUNITY
End: 2022-01-19 | Stop reason: ALTCHOICE

## 2021-05-18 NOTE — PROGRESS NOTES
HISTORY OF PRESENT ILLNESS  Karissa Gar is a 32 y.o. female. BP (!) 128/90 (BP 1 Location: Left upper arm, BP Patient Position: Sitting, BP Cuff Size: Adult)   Pulse 84   Temp 98.1 °F (36.7 °C) (Temporal)   Resp 18   Ht 5' 1\" (1.549 m)   Wt 259 lb (117.5 kg)   LMP 02/22/2021 (Exact Date)   SpO2 97%   BMI 48.94 kg/m²     About a week of sxs itching which has gotten worse. She thought she had a yeast infection so used some monistat which actually seemed to make it worse. No new sexual partners. No douching. No new soaps or underwear. Review of Systems   Constitutional: Negative for chills and fever. Genitourinary:        Vaginal irritation       Physical Exam  Genitourinary:     Labia:         Right: No rash. Left: No rash. Comments: Thick white discharge. No specific odor. No tenderness on digital check        ASSESSMENT and PLAN    ICD-10-CM ICD-9-CM    1. Acute vaginitis  N76.0 616.10 fluconazole (DIFLUCAN) 200 mg tablet      NUSWAB VAGINITIS PLUS   2. Type 2 diabetes with nephropathy (HCC)  Q98.98 135.66 METABOLIC PANEL, COMPREHENSIVE     583.81 LIPID PANEL      MICROALBUMIN, UR, RAND W/ MICROALB/CREAT RATIO      HEMOGLOBIN A1C W/O EAG      URINALYSIS W/ RFLX MICROSCOPIC       Looks yeasty    Will tx with 3 days diflucan    Update lab in anticipation of upcoming appt. This may also affect yeast infections, claribel as her last HBA1c was very high and she is way overdue for lab. F/u as appointed in June.

## 2021-05-18 NOTE — PROGRESS NOTES
Reviewed record in preparation for visit and have obtained necessary documentation. Crystal Pappas is a 32 y.o.  female presents today for office visit for   Chief Complaint   Patient presents with    Vaginal Itching   . Pt is not fasting. Patient is accompanied by self. Pt is in Room # [address] preferred language for health care discussion is english/other. Is the patient using any DME equipment during OV? NO    Advance Directive:  1. Do you have an advance directive in place? Patient Reply: NO    2. If not, would you like material regarding how to put one in place? NO    Coordination of Care:  1. Have you been to the ER, urgent care clinic since your last visit? Hospitalized since your last visit? NO    2. Have you seen or consulted any other health care providers outside of the [address] [place] [address] since your last visit? Include any pap smears or colon screening. NO    Upcoming Appts  No    VORB: No orders of the defined types were placed in this encounter.  Abhijeet Vences MD/Clarisse Pappas LPN    Crystal Pappas is due for:   Health Maintenance Due   Topic    Pneumococcal 0-64 years (1 of 1 - PPSV23)    Eye Exam Retinal or Dilated     COVID-19 Vaccine (1)    Medicare Yearly Exam     A1C test (Diabetic or Prediabetic)      Health Maintenance reviewed and discussed per provider  Please order/place referral if appropriate.     Requested Prescriptions      No prescriptions requested or ordered in this encounter       Learning Assessment:  Learning Assessment 4/27/2015   PRIMARY LEARNER Patient   HIGHEST LEVEL OF EDUCATION - PRIMARY LEARNER  DID NOT GRADUATE HIGH SCHOOL   BARRIERS PRIMARY LEARNER NONE   CO-LEARNER CAREGIVER No   PRIMARY LANGUAGE ENGLISH   LEARNER PREFERENCE PRIMARY READING   ANSWERED BY patient   RELATIONSHIP SELF     Depression Screening:  3 most recent [facility] Screens 4/14/2021 3/19/2021 2/26/2021 10/21/2020 6/25/2018 2/14/2018 11/21/2017   PHQ Not Done - - - - - - -   Little interest or pleasure in doing things Not at all Not at all Not at all Not at all Several days Not at all Not at all   Feeling down, depressed, irritable, or hopeless Not at all Not at all Not at all Not at all Several days Not at all Not at all   Total Score PHQ 2 0 0 0 0 2 0 0     Abuse Screening:  Abuse Screening Questionnaire 6/25/2018 4/27/2015   Do you ever feel afraid of your partner? N N   Are you in a relationship with someone who physically or mentally threatens you? N N   Is it safe for you to go home? Y Y     Fall Risk  No flowsheet data found.   Recent Travel Screening and Travel History documentation     Travel Screening      No screening recorded since 05/17/21 0000      Travel History   Travel since 04/18/21     No documented travel since 04/18/21

## 2021-05-21 LAB
A VAGINAE DNA VAG QL NAA+PROBE: ABNORMAL SCORE
ALBUMIN SERPL-MCNC: 4.1 G/DL (ref 3.8–4.8)
ALBUMIN/CREAT UR: 226 MG/G CREAT (ref 0–29)
ALBUMIN/GLOB SERPL: 1.1 {RATIO} (ref 1.2–2.2)
ALP SERPL-CCNC: 121 IU/L (ref 48–121)
ALT SERPL-CCNC: 64 IU/L (ref 0–32)
APPEARANCE UR: CLEAR
AST SERPL-CCNC: 50 IU/L (ref 0–40)
BACTERIA #/AREA URNS HPF: NORMAL /[HPF]
BILIRUB SERPL-MCNC: 0.3 MG/DL (ref 0–1.2)
BILIRUB UR QL STRIP: NEGATIVE
BUN SERPL-MCNC: 7 MG/DL (ref 6–20)
BUN/CREAT SERPL: 8 (ref 9–23)
BVAB2 DNA VAG QL NAA+PROBE: ABNORMAL SCORE
C ALBICANS DNA VAG QL NAA+PROBE: POSITIVE
C GLABRATA DNA VAG QL NAA+PROBE: NEGATIVE
C TRACH DNA VAG QL NAA+PROBE: NEGATIVE
CALCIUM SERPL-MCNC: 9.2 MG/DL (ref 8.7–10.2)
CASTS URNS QL MICRO: NORMAL /LPF
CHLORIDE SERPL-SCNC: 104 MMOL/L (ref 96–106)
CHOLEST SERPL-MCNC: 143 MG/DL (ref 100–199)
CO2 SERPL-SCNC: 25 MMOL/L (ref 20–29)
COLOR UR: YELLOW
CREAT SERPL-MCNC: 0.92 MG/DL (ref 0.57–1)
CREAT UR-MCNC: 138.6 MG/DL
EPI CELLS #/AREA URNS HPF: NORMAL /HPF (ref 0–10)
GLOBULIN SER CALC-MCNC: 3.6 G/DL (ref 1.5–4.5)
GLUCOSE SERPL-MCNC: 140 MG/DL (ref 65–99)
GLUCOSE UR QL: ABNORMAL
HBA1C MFR BLD: 9.3 % (ref 4.8–5.6)
HDLC SERPL-MCNC: 38 MG/DL
HGB UR QL STRIP: NEGATIVE
IMP & REVIEW OF LAB RESULTS: NORMAL
KETONES UR QL STRIP: NEGATIVE
LDLC SERPL CALC-MCNC: 81 MG/DL (ref 0–99)
LEUKOCYTE ESTERASE UR QL STRIP: NEGATIVE
MEGA1 DNA VAG QL NAA+PROBE: ABNORMAL SCORE
MICRO URNS: ABNORMAL
MICROALBUMIN UR-MCNC: 313.7 UG/ML
N GONORRHOEA DNA VAG QL NAA+PROBE: NEGATIVE
NITRITE UR QL STRIP: NEGATIVE
PH UR STRIP: 5.5 [PH] (ref 5–7.5)
POTASSIUM SERPL-SCNC: 4.1 MMOL/L (ref 3.5–5.2)
PROT SERPL-MCNC: 7.7 G/DL (ref 6–8.5)
PROT UR QL STRIP: ABNORMAL
RBC #/AREA URNS HPF: NORMAL /HPF (ref 0–2)
SODIUM SERPL-SCNC: 140 MMOL/L (ref 134–144)
SP GR UR: >=1.03 (ref 1–1.03)
T VAGINALIS DNA VAG QL NAA+PROBE: NEGATIVE
TRIGL SERPL-MCNC: 132 MG/DL (ref 0–149)
UROBILINOGEN UR STRIP-MCNC: 0.2 MG/DL (ref 0.2–1)
VLDLC SERPL CALC-MCNC: 24 MG/DL (ref 5–40)
WBC #/AREA URNS HPF: NORMAL /HPF (ref 0–5)

## 2021-06-16 DIAGNOSIS — M54.50 CHRONIC LOW BACK PAIN WITHOUT SCIATICA, UNSPECIFIED BACK PAIN LATERALITY: ICD-10-CM

## 2021-06-16 DIAGNOSIS — G89.29 CHRONIC LOW BACK PAIN WITHOUT SCIATICA, UNSPECIFIED BACK PAIN LATERALITY: ICD-10-CM

## 2021-06-16 DIAGNOSIS — Z76.0 MEDICATION REFILL: ICD-10-CM

## 2021-06-16 DIAGNOSIS — Z79.891 LONG TERM (CURRENT) USE OF OPIATE ANALGESIC: ICD-10-CM

## 2021-06-16 RX ORDER — HYDROCODONE BITARTRATE AND ACETAMINOPHEN 5; 325 MG/1; MG/1
1 TABLET ORAL
Qty: 120 TABLET | Refills: 0 | Status: SHIPPED | OUTPATIENT
Start: 2021-06-16 | End: 2021-07-14

## 2021-07-14 DIAGNOSIS — M54.50 CHRONIC LOW BACK PAIN WITHOUT SCIATICA, UNSPECIFIED BACK PAIN LATERALITY: ICD-10-CM

## 2021-07-14 DIAGNOSIS — Z79.891 LONG TERM (CURRENT) USE OF OPIATE ANALGESIC: ICD-10-CM

## 2021-07-14 DIAGNOSIS — Z76.0 MEDICATION REFILL: ICD-10-CM

## 2021-07-14 DIAGNOSIS — G89.29 CHRONIC LOW BACK PAIN WITHOUT SCIATICA, UNSPECIFIED BACK PAIN LATERALITY: ICD-10-CM

## 2021-07-14 RX ORDER — HYDROCODONE BITARTRATE AND ACETAMINOPHEN 5; 325 MG/1; MG/1
TABLET ORAL
Qty: 120 TABLET | Refills: 0 | Status: SHIPPED | OUTPATIENT
Start: 2021-07-14 | End: 2021-08-17

## 2021-07-21 ENCOUNTER — OFFICE VISIT (OUTPATIENT)
Dept: CARDIOLOGY CLINIC | Age: 31
End: 2021-07-21
Payer: MEDICARE

## 2021-07-21 VITALS
BODY MASS INDEX: 49.09 KG/M2 | HEIGHT: 61 IN | WEIGHT: 260 LBS | OXYGEN SATURATION: 93 % | HEART RATE: 67 BPM | SYSTOLIC BLOOD PRESSURE: 94 MMHG | DIASTOLIC BLOOD PRESSURE: 60 MMHG

## 2021-07-21 DIAGNOSIS — I42.0 DILATED CARDIOMYOPATHY (HCC): Primary | ICD-10-CM

## 2021-07-21 PROCEDURE — G8510 SCR DEP NEG, NO PLAN REQD: HCPCS | Performed by: INTERNAL MEDICINE

## 2021-07-21 PROCEDURE — G8417 CALC BMI ABV UP PARAM F/U: HCPCS | Performed by: INTERNAL MEDICINE

## 2021-07-21 PROCEDURE — G8427 DOCREV CUR MEDS BY ELIG CLIN: HCPCS | Performed by: INTERNAL MEDICINE

## 2021-07-21 PROCEDURE — 99214 OFFICE O/P EST MOD 30 MIN: CPT | Performed by: INTERNAL MEDICINE

## 2021-07-21 NOTE — PROGRESS NOTES
Sherrine Meigs presents today for   Chief Complaint   Patient presents with    Follow-up     2 month follow up        Sherrine Meigs preferred language for health care discussion is english/other. Is someone accompanying this pt? no    Is the patient using any DME equipment during [address]? no    Depression Screening:  3 most recent PHQ Screens 7/21/2021   PHQ Not Done -   Little interest or pleasure in doing things Not at all   Feeling down, depressed, irritable, or hopeless Not at all   Total Score PHQ 2 0       Learning Assessment:  Learning Assessment 4/27/2015   PRIMARY LEARNER Patient   HIGHEST LEVEL OF EDUCATION - PRIMARY LEARNER  DID NOT GRADUATE HIGH SCHOOL   BARRIERS PRIMARY LEARNER NONE   CO-LEARNER CAREGIVER No   PRIMARY LANGUAGE ENGLISH   LEARNER PREFERENCE PRIMARY READING   ANSWERED BY patient   RELATIONSHIP SELF       Abuse Screening:  Abuse Screening Questionnaire 5/18/2021   Do you ever feel afraid of your partner? N   Are you in a relationship with someone who physically or mentally threatens you? N   Is it safe for you to go home? Y     Pt currently taking Anticoagulant therapy? ASA 81mg every day     Coordination of Care:  1. Have you been to the ER, urgent care clinic since your last visit? Hospitalized since your last visit? 6/16 for chest pain; 6/24 for chest pain     2. Have you seen or consulted any other health care providers outside of the [address] [place] [address] since your last visit? Include any pap smears or colon screening.  no

## 2021-07-30 NOTE — PROGRESS NOTES
Subjective:      Muna is in the office today for cardiac reevaluation. She is a 71-year-old diabetic woman with history of systolic heart failure. Her last echocardiogram was done in October 2020 which demonstrated an ejection fraction of 38%. She also grade 1 diastolic dysfunction. She was hospitalized at [facility] for acute CHF in the past. She was seen in the [facility] ED on 2/9/2021. At that time she presented with chest pain and a cough. She was evaluated and discharged on antibiotics and steroids and felt to have bronchitis at that time. She was more recently seen in the ED at [facility] for evaluation of chest pain. He was ruled out and discharged for follow-up. The patient had a previous cardiac CTA which did not demonstrate any significant obstructive coronary disease. In the office today, she reports she feels \"good \". Her breathing has been \"good \". She has had no peripheral swelling. She has had no palpitations, dizziness, near syncope or syncope. She had no further chest pain. Patient's cardiac risk factors are smoking/ tobacco exposure, diabetes mellitus. Patient Active Problem List    Diagnosis Date Noted    Dilated cardiomyopathy ([facility] 03/24/2021    Tobacco abuse 24/88/4840    Acute systolic heart failure ([facility] 10/16/2020    Shortness of breath 08/29/2020    Type 2 diabetes with nephropathy ([facility] 12/17/2019    Asthma     Obesity, morbid ([facility] 11/27/2017    Controlled type 2 diabetes mellitus without complication, without long-term current use of insulin ([facility]) 06/29/2017    Nasal congestion 07/16/2015    Chronic back pain 07/16/2015    Anxiety 07/16/2015     Current Outpatient Medications   Medication Sig Dispense Refill      120 Tablet 0    methocarbamoL (ROBAXIN) 750 mg tablet Take 1 Tablet by mouth four (4) times daily. 20 Tablet 0    miconazole nitrate (Monistat 3) 200 mg- 2 % (9 gram) kit Insert  into vagina nightly.       miconazole nitrate (Monistat 7) 2 % (100 mg)- 2 % (9 gram) cpfc Insert  into vagina.  Vitamin D2 1,250 mcg (50,000 unit) capsule take 1 capsule by mouth every week 4 Cap 5    carvediloL (COREG) 6.25 mg tablet Take 1 Tab by mouth two (2) times daily (with meals). 180 Tab 3    sacubitriL-valsartan (Entresto) 49-51 mg tab tablet Take 1 Tab by mouth two (2) times a day. (Patient taking differently: Take 2 Tabs by mouth two (2) times a day.) 60 Tab 5    albuterol (PROVENTIL HFA, VENTOLIN HFA, PROAIR HFA) 90 mcg/actuation inhaler Take 2 Puffs by inhalation every six (6) hours as needed for Wheezing. 18 g 5    fluticasone propionate (Flovent HFA) 110 mcg/actuation inhaler Take 1 Puff by inhalation every twelve (12) hours. 1 Inhaler 5    predniSONE (DELTASONE) 20 mg tablet Take 3 tablets by mouth for three days, then 2 for 3 days, 1 for 3 days 18 Tab 0    metFORMIN (GLUCOPHAGE) 500 mg tablet take 1 tablet by mouth twice a day with meals 60 Tab 5    empagliflozin (JARDIANCE) 25 mg tablet Take 1 Tab by mouth daily. 30 Tab 5    aspirin delayed-release 81 mg tablet Take 1 Tab by mouth daily. 100 Tab 5    atorvastatin (LIPITOR) 40 mg tablet Take 1 Tab by mouth nightly. 30 Tab 5    insulin lispro protamin-lispro (HUMALOG 75-25 MIX) flexpen 30 Units by SubCUTAneous route Before breakfast and dinner. 5 Pen 5    fluticasone propionate (FLONASE) 50 mcg/actuation nasal spray 2 Sprays by Both Nostrils route daily. 1 Bottle 5    buPROPion SR (WELLBUTRIN SR) 150 mg SR tablet Take 1 Tab by mouth two (2) times a day. 60 Tab 5    loratadine (CLARITIN REDITABS) 10 mg dissolvable tablet Take 1 Tab by mouth daily. 30 Tab 5    albuterol (PROVENTIL VENTOLIN) 2.5 mg /3 mL (0.083 %) nebu 3 mL by Nebulization route three (3) times daily as needed (wheezing). Indications: bronchospasm 100 Each 5    famotidine (PEPCID) 20 mg tablet Take 1 Tab by mouth daily.  (Patient taking differently: Take 20 mg by mouth as needed.) 90 Tab 5    diphenhydrAMINE (BENADRYL) 25 mg capsule 25 mg every six (6) hours as needed. Allergies   Allergen Reactions    Other Food Anaphylaxis     [place], guacamole    Argan Kernal Oil (Cathryne Deneen) Hives    Naproxen Swelling    Percocet [Oxycodone-Acetaminophen] Itching     Past Medical History:   Diagnosis Date    ASCUS (atypical squamous cells of undetermined significance) on Pap smear 3/11    Asthma     Bipolar disorder ([facility]     Chlamydia      \"    Chronic back pain     COVID-19 02/09/2021    Depression     Diabetes mellitus ([facility] 01/02/2017    Elevated blood sugar     GC (gonococcus infection) 2007/ 2004    Heart failure ([facility]     HPV (human papilloma virus) infection 3/11    Irregular menses     Schizophrenia ([facility]     Sleep apnea 2/14    Smoker     Suicide attempt ([facility] 5/08    with tylenol    [facility] 10/09    Trichomonal vaginitis 8/10    Uterine fibroid      No past surgical history on file.   Family History   Problem Relation Age of Onset    Attention Deficit Hyperactivity Disorder Brother     Bipolar Disorder Brother     Seizures Sister     Hypertension Sister     Other Sister         substance abuse     Social History     Tobacco Use   Smoking Status Current Every Day Smoker    Packs/day: 0.25    Years: 9.00    Pack years: 2.25    Types: Cigarettes   Smokeless Tobacco Never Used   Tobacco Comment    unable/unwilling to quit at this time          Review of Systems, additional:  Constitutional: negative  Eyes: negative  Respiratory: positive for wheezing  Cardiovascular: negative  Gastrointestinal: negative  Musculoskeletal:negative  Neurological: negative  Behvioral/Psych: negative  Endocrine: negative  ENT: negative    Objective:     Visit Vitals  BP 94/60 (BP 1 Location: Left upper arm, BP Patient Position: Sitting, BP Cuff Size: Adult)   Pulse 67   Ht 5' 1\" (1.549 m)   Wt 117.9 kg (260 lb)   SpO2 93%   BMI 49.13 kg/m²     General:  alert, cooperative, no distress Chest Wall: inspection normal - no chest wall deformities or tenderness, respiratory effort normal   Lung: clear to auscultation bilaterally   Heart:  normal rate and regular rhythm, S1 and S2 normal, no murmurs noted, no gallops noted, no JVD   Abdomen: soft, non-tender. Bowel sounds normal. No masses,  no organomegaly   Extremities: extremities normal, atraumatic, no cyanosis or edema Skin: no rashes   Neuro: alert, oriented, normal speech, no focal findings or movement disorder noted     EK2021. Sinus rhythm. Normal.    Assessment/Plan:       ICD-10-CM ICD-9-CM    1. Type 2 diabetes with nephropathy (HCC)  E11.21 250.40      583.81    2. Tobacco abuse  Z72.0 305.1    3. Dilated cardiomyopathy ([facility] , last echo 10/16/2020. EF 38%. Moderate global hypokinesis. Stage I diastolic dysfunction. Continue Entresto  49/51 twice daily. Continue carvedilol 6.25 twice daily. Return in 4 months and will arrange echocardiogram at that time. I42.0 425.4    4. Anxiety  F41.9 300.00    5. CADY, diagnosed , \"supposed to sleep on CPAP \"      6       Chest pain, [facility] ED visit 2021. No significant ECG changes.   Patient observed, biomarkers checked, discharged for follow-up

## 2021-08-17 ENCOUNTER — HOSPITAL ENCOUNTER (OUTPATIENT)
Dept: LAB | Age: 31
Discharge: HOME | End: 2021-08-17

## 2021-08-17 ENCOUNTER — OFFICE VISIT (OUTPATIENT)
Dept: INTERNAL MEDICINE CLINIC | Age: 31
End: 2021-08-17
Payer: MEDICARE

## 2021-08-17 VITALS
DIASTOLIC BLOOD PRESSURE: 86 MMHG | WEIGHT: 258.6 LBS | HEART RATE: 83 BPM | OXYGEN SATURATION: 97 % | TEMPERATURE: 97.3 F | RESPIRATION RATE: 19 BRPM | BODY MASS INDEX: 48.82 KG/M2 | SYSTOLIC BLOOD PRESSURE: 123 MMHG | HEIGHT: 61 IN

## 2021-08-17 DIAGNOSIS — Z79.891 LONG TERM (CURRENT) USE OF OPIATE ANALGESIC: ICD-10-CM

## 2021-08-17 DIAGNOSIS — E11.21 TYPE 2 DIABETES WITH NEPHROPATHY (HCC): ICD-10-CM

## 2021-08-17 DIAGNOSIS — Z00.00 MEDICARE ANNUAL WELLNESS VISIT, SUBSEQUENT: Primary | ICD-10-CM

## 2021-08-17 DIAGNOSIS — Z76.0 MEDICATION REFILL: ICD-10-CM

## 2021-08-17 DIAGNOSIS — I42.0 DILATED CARDIOMYOPATHY (HCC): ICD-10-CM

## 2021-08-17 DIAGNOSIS — M54.50 CHRONIC LOW BACK PAIN WITHOUT SCIATICA, UNSPECIFIED BACK PAIN LATERALITY: ICD-10-CM

## 2021-08-17 DIAGNOSIS — B37.31 CANDIDA VAGINITIS: ICD-10-CM

## 2021-08-17 DIAGNOSIS — F17.200 TOBACCO USE DISORDER: ICD-10-CM

## 2021-08-17 DIAGNOSIS — G89.29 CHRONIC LOW BACK PAIN WITHOUT SCIATICA, UNSPECIFIED BACK PAIN LATERALITY: ICD-10-CM

## 2021-08-17 LAB — XX-LABCORP SPECIMEN COL,LCBCF: NORMAL

## 2021-08-17 PROCEDURE — G8432 DEP SCR NOT DOC, RNG: HCPCS | Performed by: INTERNAL MEDICINE

## 2021-08-17 PROCEDURE — G0439 PPPS, SUBSEQ VISIT: HCPCS | Performed by: INTERNAL MEDICINE

## 2021-08-17 PROCEDURE — 3046F HEMOGLOBIN A1C LEVEL >9.0%: CPT | Performed by: INTERNAL MEDICINE

## 2021-08-17 PROCEDURE — 99001 SPECIMEN HANDLING PT-LAB: CPT

## 2021-08-17 PROCEDURE — G8427 DOCREV CUR MEDS BY ELIG CLIN: HCPCS | Performed by: INTERNAL MEDICINE

## 2021-08-17 PROCEDURE — G8417 CALC BMI ABV UP PARAM F/U: HCPCS | Performed by: INTERNAL MEDICINE

## 2021-08-17 PROCEDURE — 2022F DILAT RTA XM EVC RTNOPTHY: CPT | Performed by: INTERNAL MEDICINE

## 2021-08-17 PROCEDURE — 99214 OFFICE O/P EST MOD 30 MIN: CPT | Performed by: INTERNAL MEDICINE

## 2021-08-17 RX ORDER — HYDROCODONE BITARTRATE AND ACETAMINOPHEN 5; 325 MG/1; MG/1
TABLET ORAL
Qty: 120 TABLET | Refills: 0 | Status: SHIPPED | OUTPATIENT
Start: 2021-08-17 | End: 2021-09-20 | Stop reason: SDUPTHER

## 2021-08-17 RX ORDER — GLIPIZIDE 5 MG/1
5 TABLET, FILM COATED, EXTENDED RELEASE ORAL DAILY
COMMUNITY
Start: 2021-06-11 | End: 2021-12-10

## 2021-08-17 RX ORDER — FLUCONAZOLE 200 MG/1
200 TABLET ORAL DAILY
Qty: 3 TABLET | Refills: 2 | Status: SHIPPED | OUTPATIENT
Start: 2021-08-17 | End: 2022-02-13

## 2021-08-17 NOTE — PROGRESS NOTES
HISTORY OF PRESENT ILLNESS  Sarah Quiroga is a 32 y.o. female. /86 (BP 1 Location: Left upper arm, BP Patient Position: Sitting, BP Cuff Size: Large adult)   Pulse 83   Temp 97.3 °F (36.3 °C) (Temporal)   Resp 19   Ht 5' 1\" (1.549 m)   Wt 258 lb 9.6 oz (117.3 kg)   SpO2 97%   BMI 48.86 kg/m²     Stopped her insulin a week ago. Due to change in her formulary and because she got redness and itching at the injection site. She says she is doing fine just on the metformin. And she is on Jardiance            Current Outpatient Medications:     glipiZIDE SR (GLUCOTROL XL) 5 mg CR tablet, Take 5 mg by mouth daily. , Disp: , Rfl:     miconazole nitrate (Monistat 3) 200 mg- 2 % (9 gram) kit, Insert  into vagina nightly., Disp: , Rfl:     miconazole nitrate (Monistat 7) 2 % (100 mg)- 2 % (9 gram) cpfc, Insert  into vagina. , Disp: , Rfl:     Vitamin D2 1,250 mcg (50,000 unit) capsule, take 1 capsule by mouth every week, Disp: 4 Cap, Rfl: 5    carvediloL (COREG) 6.25 mg tablet, Take 1 Tab by mouth two (2) times daily (with meals). , Disp: 180 Tab, Rfl: 3    sacubitriL-valsartan (Entresto) 49-51 mg tab tablet, Take 1 Tab by mouth two (2) times a day. (Patient taking differently: Take 2 Tabs by mouth two (2) times a day.), Disp: 60 Tab, Rfl: 5    albuterol (PROVENTIL HFA, VENTOLIN HFA, PROAIR HFA) 90 mcg/actuation inhaler, Take 2 Puffs by inhalation every six (6) hours as needed for Wheezing., Disp: 18 g, Rfl: 5    fluticasone propionate (Flovent HFA) 110 mcg/actuation inhaler, Take 1 Puff by inhalation every twelve (12) hours. , Disp: 1 Inhaler, Rfl: 5    metFORMIN (GLUCOPHAGE) 500 mg tablet, take 1 tablet by mouth twice a day with meals, Disp: 60 Tab, Rfl: 5    empagliflozin (JARDIANCE) 25 mg tablet, Take 1 Tab by mouth daily. , Disp: 30 Tab, Rfl: 5    aspirin delayed-release 81 mg tablet, Take 1 Tab by mouth daily. , Disp: 100 Tab, Rfl: 5    atorvastatin (LIPITOR) 40 mg tablet, Take 1 Tab by mouth nightly., Disp: 30 Tab, Rfl: 5    fluticasone propionate (FLONASE) 50 mcg/actuation nasal spray, 2 Sprays by Both Nostrils route daily. , Disp: 1 Bottle, Rfl: 5    famotidine (PEPCID) 20 mg tablet, Take 1 Tab by mouth daily. (Patient taking differently: Take 20 mg by mouth as needed.), Disp: 90 Tab, Rfl: 5    diphenhydrAMINE (BENADRYL) 25 mg capsule, 25 mg every six (6) hours as needed. , Disp: , Rfl:     loratadine (CLARITIN REDITABS) 10 mg dissolvable tablet, Take 1 Tab by mouth daily. (Patient not taking: Reported on 8/17/2021), Disp: 30 Tab, Rfl: 5    albuterol (PROVENTIL VENTOLIN) 2.5 mg /3 mL (0.083 %) nebu, 3 mL by Nebulization route three (3) times daily as needed (wheezing). Indications: bronchospasm (Patient not taking: Reported on 8/17/2021), Disp: 100 Each, Rfl: 5                Diabetes  The history is provided by the patient. This is a chronic problem. The current episode started more than 1 week ago. The problem occurs daily. The problem has not changed since onset. Pertinent negatives include no chest pain and no shortness of breath. Review of Systems   Constitutional: Negative for chills and fever. Eyes: Negative for blurred vision and double vision. Respiratory: Negative for cough and shortness of breath. Cardiovascular: Negative for chest pain and palpitations. Genitourinary: Negative for frequency. Musculoskeletal: Positive for joint pain. Endo/Heme/Allergies: Negative for polydipsia. Physical Exam  Vitals and nursing note reviewed. Constitutional:       General: She is not in acute distress. Appearance: Normal appearance. She is well-developed. She is not diaphoretic. HENT:      Head: Normocephalic and atraumatic.       Right Ear: Tympanic membrane, ear canal and external ear normal.      Left Ear: Tympanic membrane, ear canal and external ear normal.      Nose: Nose normal.      Mouth/Throat:      Mouth: Mucous membranes are moist.      Pharynx: No oropharyngeal exudate. Eyes:      General: No scleral icterus. Right eye: No discharge. Left eye: No discharge. Conjunctiva/sclera: Conjunctivae normal.      Pupils: Pupils are equal, round, and reactive to light. Neck:      Thyroid: No thyromegaly. Vascular: No JVD. Trachea: No tracheal deviation. Cardiovascular:      Rate and Rhythm: Normal rate and regular rhythm. Heart sounds: Normal heart sounds. No murmur heard. No gallop. Pulmonary:      Effort: Pulmonary effort is normal. No respiratory distress. Breath sounds: Normal breath sounds. No wheezing or rales. Abdominal:      General: Bowel sounds are normal. There is no distension. Palpations: Abdomen is soft. There is no mass. Tenderness: There is no abdominal tenderness. There is no guarding or rebound. Genitourinary:     Exam position: Supine. Musculoskeletal:         General: No tenderness. Cervical back: Normal range of motion and neck supple. Right lower leg: No edema. Left lower leg: No edema. Lymphadenopathy:      Cervical: No cervical adenopathy. Skin:     General: Skin is warm and dry. Findings: No erythema or rash. Neurological:      General: No focal deficit present. Mental Status: She is alert and oriented to person, place, and time. She is not disoriented. Cranial Nerves: No cranial nerve deficit. Sensory: No sensory deficit. Motor: No abnormal muscle tone. Coordination: Coordination normal.      Gait: Gait normal.      Deep Tendon Reflexes: Reflexes are normal and symmetric.  Reflexes normal.      Comments:   Diabetic foot exam:     Left Foot:   Visual Exam: high arch   Pulse DP: 2+ (normal)   Filament test: normal sensation    Vibratory sensation: normal      Right Foot:   Visual Exam: high arch   Pulse DP: 2+ (normal)   Filament test: normal sensation    Vibratory sensation: normal         Psychiatric:         Mood and Affect: Mood normal.         Speech: Speech normal.         Behavior: Behavior normal.         Thought Content: Thought content normal.         Judgment: Judgment normal.         ASSESSMENT and PLAN    ICD-10-CM ICD-9-CM           2. Type 2 diabetes with nephropathy (HCC)  C56.01 806.09 METABOLIC PANEL, COMPREHENSIVE     583.81 LIPID PANEL      HEMOGLOBIN A1C W/O EAG       DIABETES FOOT EXAM   3. Dilated cardiomyopathy (HCC)  C40.0 070.5 METABOLIC PANEL, COMPREHENSIVE   4. Candida vaginitis  B37.3 112.1 fluconazole (DIFLUCAN) 200 mg tablet   5. Tobacco use disorder  F17.200 305.1    6. Long term (current) use of opiate analgesic  Z79.891 V58.69 TOXASSURE SELECT 13 (MW)   7. Medication refill  Z76.0 V68.1 fluconazole (DIFLUCAN) 200 mg tablet       DM ? Status    update all lab today    Consider GLP-1 if needed    Discussed smoking cessation for 3 minutes.  She knows she should stop but has not really been able to due to circumstances    F/u 4 months for DM, heart

## 2021-08-17 NOTE — PROGRESS NOTES
Reviewed record in preparation for visit and have obtained necessary documentation. Simone Mishra is a 32 y.o.  female presents today for office visit for   Chief Complaint   Patient presents with    Physical   . Pt is  fasting. Patient is accompanied by self. I have received verbal consent from Simone Mishra to discuss any/all medical information while they are present in the room. Pt is in Room # [address] preferred language for health care discussion is english/other. Is the patient using any DME equipment during OV? NO    Advance Directive:  1. Do you have an advance directive in place? Patient Reply: NO    2. If not, would you like material regarding how to put one in place? NO    Coordination of Care:  1. Have you been to the ER, urgent care clinic since your last visit? Hospitalized since your last visit? NO    2. Have you seen or consulted any other health care providers outside of the [address] [place] [address] since your last visit? Include any pap smears or colon screening. NO    Upcoming Appts  No    VORB: No orders of the defined types were placed in this encounter.  Rosa Heart MD/Clarisse Clark LPN    Simone Mishra is due for:   Health Maintenance Due   Topic    Pneumococcal 0-64 years (1 of 2 - PPSV23)    Eye Exam Retinal or Dilated     COVID-19 Vaccine (1)    Medicare Yearly Exam     A1C test (Diabetic or Prediabetic)      Health Maintenance reviewed and discussed per provider  Please order/place referral if appropriate.     Requested Prescriptions      No prescriptions requested or ordered in this encounter       Learning Assessment:  Learning Assessment 4/27/2015   PRIMARY LEARNER Patient   HIGHEST LEVEL OF EDUCATION - PRIMARY LEARNER  DID NOT GRADUATE HIGH SCHOOL   BARRIERS PRIMARY LEARNER NONE   CO-LEARNER CAREGIVER No   PRIMARY LANGUAGE ENGLISH   LEARNER PREFERENCE PRIMARY READING   ANSWERED BY patient   RELATIONSHIP SELF     Depression Screening:  3 most recent [facility] Screens 7/21/2021 5/18/2021 4/14/2021 3/19/2021 2/26/2021 10/21/2020 6/25/2018   PHQ Not Done - Active Diagnosis of Depression or Bipolar Disorder - - - - -   Little interest or pleasure in doing things Not at all Not at all Not at all Not at all Not at all Not at all Several days   Feeling down, depressed, irritable, or hopeless Not at all Not at all Not at all Not at all Not at all Not at all Several days   Total Score PHQ 2 0 0 0 0 0 0 2     Abuse Screening:  Abuse Screening Questionnaire 5/18/2021 6/25/2018 4/27/2015   Do you ever feel afraid of your partner? N N N   Are you in a relationship with someone who physically or mentally threatens you? N N N   Is it safe for you to go home? Y Y Y     Fall Risk  No flowsheet data found.   Recent Travel Screening and Travel History documentation     Travel Screening      No screening recorded since 08/16/21 0000      Travel History   Travel since 07/17/21     No documented travel since 07/17/21

## 2021-08-17 NOTE — PROGRESS NOTES
This is the Subsequent Medicare Annual Wellness Exam, performed 12 months or more after the Initial AWV or the last Subsequent AWV    I have reviewed the patient's medical history in detail and updated the computerized patient record. Assessment/Plan   Education and counseling provided:  Are appropriate based on today's review and evaluation    1. Medicare annual wellness visit, subsequent       Depression Risk Factor Screening     3 most recent PHQ Screens 7/21/2021   PHQ Not Done -   Little interest or pleasure in doing things Not at all   Feeling down, depressed, irritable, or hopeless Not at all   Total Score PHQ 2 0       Alcohol Risk Screen    Do you average more than 1 drink per night or more than 7 drinks a week:  No    On any one occasion in the past three months have you have had more than 3 drinks containing alcohol:  No        Functional Ability and Level of Safety    Hearing: Hearing is good. Activities of Daily Living: The home contains: no safety equipment. Patient does total self care      Ambulation: with mild difficulty     Fall Risk:  No flowsheet data found.    Abuse Screen:  Patient is not abused       Cognitive Screening    Has your family/caregiver stated any concerns about your memory: no     Cognitive Screening: Normal - Animal Naming Test    Health Maintenance Due     Health Maintenance Due   Topic Date Due    Pneumococcal 0-64 years (1 of 2 - PPSV23) Never done    Eye Exam Retinal or Dilated  Never done    COVID-19 Vaccine (1) Never done    A1C test (Diabetic or Prediabetic)  08/18/2021       Patient Care Team   Patient Care Team:  Carlito Gaffney MD as PCP - General (Internal Medicine)  Carlito Gaffney MD as PCP - [facility] Provider  Sharmila Mae RN as Ambulatory Care Manager    History     Patient Active Problem List   Diagnosis Code    Nasal congestion R09.81    Chronic back pain M54.9, G89.29    Anxiety F41.9    Controlled type 2 diabetes mellitus without complication, without long-term current use of insulin ([facility]) E11.9    Obesity, morbid ([facility]) E66.01    Asthma J45.909    Type 2 diabetes with nephropathy ([facility] H59.02    Acute systolic heart failure ([facility]) I50.21    Shortness of breath R06.02    Dilated cardiomyopathy ([facility]) I42.0    Tobacco abuse Z72.0     Past Medical History:   Diagnosis Date    ASCUS (atypical squamous cells of undetermined significance) on Pap smear 3/11    Asthma     Bipolar disorder ([facility]     Cardiomyopathy ([facility] 10/2020    EF 38% on echo. Global hypokinesis. Cardiac CT no significant obstructive disease    Chlamydia      \"    Chronic back pain     COVID-19 02/09/2021    Depression     Diabetes mellitus ([facility] 01/02/2017    Elevated blood sugar     GC (gonococcus infection) 2007/ 2004    Heart failure ([facility])     HPV (human papilloma virus) infection 3/11    Irregular menses     Schizophrenia ([facility]     Sleep apnea 2/14    Smoker     Suicide attempt ([facility] 5/08    with tylenol    Suicide attempt ([facility] 10/09    Trichomonal vaginitis 8/10    Uterine fibroid       History reviewed. No pertinent surgical history. Current Outpatient Medications   Medication Sig Dispense Refill    glipiZIDE SR (GLUCOTROL XL) 5 mg CR tablet Take 5 mg by mouth daily.  miconazole nitrate (Monistat 3) 200 mg- 2 % (9 gram) kit Insert  into vagina nightly.  miconazole nitrate (Monistat 7) 2 % (100 mg)- 2 % (9 gram) cpfc Insert  into vagina.  Vitamin D2 1,250 mcg (50,000 unit) capsule take 1 capsule by mouth every week 4 Cap 5    carvediloL (COREG) 6.25 mg tablet Take 1 Tab by mouth two (2) times daily (with meals). 180 Tab 3    sacubitriL-valsartan (Entresto) 49-51 mg tab tablet Take 1 Tab by mouth two (2) times a day.  (Patient taking differently: Take 2 Tabs by mouth two (2) times a day.) 60 Tab 5    albuterol (PROVENTIL HFA, VENTOLIN HFA, PROAIR HFA) 90 mcg/actuation inhaler Take 2 Puffs by inhalation every six (6) hours as needed for Wheezing. 18 g 5    fluticasone propionate (Flovent HFA) 110 mcg/actuation inhaler Take 1 Puff by inhalation every twelve (12) hours. 1 Inhaler 5    metFORMIN (GLUCOPHAGE) 500 mg tablet take 1 tablet by mouth twice a day with meals 60 Tab 5    empagliflozin (JARDIANCE) 25 mg tablet Take 1 Tab by mouth daily. 30 Tab 5    aspirin delayed-release 81 mg tablet Take 1 Tab by mouth daily. 100 Tab 5    atorvastatin (LIPITOR) 40 mg tablet Take 1 Tab by mouth nightly. 30 Tab 5    fluticasone propionate (FLONASE) 50 mcg/actuation nasal spray 2 Sprays by Both Nostrils route daily. 1 Bottle 5    famotidine (PEPCID) 20 mg tablet Take 1 Tab by mouth daily. (Patient taking differently: Take 20 mg by mouth as needed.) 90 Tab 5    diphenhydrAMINE (BENADRYL) 25 mg capsule 25 mg every six (6) hours as needed.  methocarbamoL (ROBAXIN) 750 mg tablet Take 1 Tablet by mouth four (4) times daily. (Patient not taking: Reported on 8/17/2021) 20 Tablet 0    predniSONE (DELTASONE) 20 mg tablet Take 3 tablets by mouth for three days, then 2 for 3 days, 1 for 3 days (Patient not taking: Reported on 8/17/2021) 18 Tab 0    insulin lispro protamin-lispro (HUMALOG 75-25 MIX) flexpen 30 Units by SubCUTAneous route Before breakfast and dinner. (Patient not taking: Reported on 8/17/2021) 5 Pen 5    buPROPion SR (WELLBUTRIN SR) 150 mg SR tablet Take 1 Tab by mouth two (2) times a day. (Patient not taking: Reported on 8/17/2021) 60 Tab 5    loratadine (CLARITIN REDITABS) 10 mg dissolvable tablet Take 1 Tab by mouth daily. (Patient not taking: Reported on 8/17/2021) 30 Tab 5    albuterol (PROVENTIL VENTOLIN) 2.5 mg /3 mL (0.083 %) nebu 3 mL by Nebulization route three (3) times daily as needed (wheezing).  Indications: bronchospasm (Patient not taking: Reported on 8/17/2021) 100 Each 5     Allergies   Allergen Reactions    Other Food Anaphylaxis     [place], guacamole    Argan Kernal Oil (Arganina Spinosa) Jasmines    Naproxen Swelling    Percocet [Oxycodone-Acetaminophen] Itching       Family History   Problem Relation Age of Onset    Attention Deficit Hyperactivity Disorder Brother     Bipolar Disorder Brother     Seizures Sister     Hypertension Sister     Other Sister         substance abuse     Social History     Tobacco Use    Smoking status: Current Every Day Smoker     Packs/day: 0.25     Years: 9.00     Pack years: 2.25     Types: Cigarettes     Start date: 2004    Smokeless tobacco: Never Used    Tobacco comment: unable/unwilling to quit at this time   Substance Use Topics    Alcohol use: Yes     Comment: occassionally         Dylan Pace MD

## 2021-08-17 NOTE — PATIENT INSTRUCTIONS
Medicare Wellness Visit, Female     The best way to live healthy is to have a lifestyle where you eat a well-balanced diet, exercise regularly, limit alcohol use, and quit all forms of tobacco/nicotine, if applicable. Regular preventive services are another way to keep healthy. Preventive services (vaccines, screening tests, monitoring & exams) can help personalize your care plan, which helps you manage your own care. Screening tests can find health problems at the earliest stages, when they are easiest to treat. [facility] follows the current, evidence-based guidelines published by the [facility] ([facility]) when recommending preventive services for our patients. Because we follow these guidelines, sometimes recommendations change over time as research supports it. (For example, mammograms used to be recommended annually. Even though Medicare will still pay for an annual mammogram, the newer guidelines recommend a mammogram every two years for women of average risk). Of course, you and your doctor may decide to screen more often for some diseases, based on your risk and your co-morbidities (chronic disease you are already diagnosed with). Preventive services for you include:  - Medicare offers their members a free annual wellness visit, which is time for you and your primary care provider to discuss and plan for your preventive service needs. Take advantage of this benefit every year!  -All adults over the age of 72 should receive the recommended pneumonia vaccines. Current USPSTF guidelines recommend a series of two vaccines for the best pneumonia protection.   -All adults should have a flu vaccine yearly and a tetanus vaccine every 10 years.   -All adults age 48 and older should receive the shingles vaccines (series of two vaccines).       -All adults age 38-68 who are overweight should have a diabetes screening test once every three years.   -All adults born between [birth year] and 1965 should be screened once for Hepatitis C.  -Other screening tests and preventive services for persons with diabetes include: an eye exam to screen for diabetic retinopathy, a kidney function test, a foot exam, and stricter control over your cholesterol.   -Cardiovascular screening for adults with routine risk involves an electrocardiogram (ECG) at intervals determined by your doctor.   -Colorectal cancer screenings should be done for adults age 54-65 with no increased risk factors for colorectal cancer. There are a number of acceptable methods of screening for this type of cancer. Each test has its own benefits and drawbacks. Discuss with your doctor what is most appropriate for you during your annual wellness visit. The different tests include: colonoscopy (considered the best screening method), a fecal occult blood test, a fecal DNA test, and sigmoidoscopy.    -A bone mass density test is recommended when a woman turns 65 to screen for osteoporosis. This test is only recommended one time, as a screening. Some providers will use this same test as a disease monitoring tool if you already have osteoporosis. -Breast cancer screenings are recommended every other year for women of normal risk, age 54-69.  -Cervical cancer screenings for women over age 72 are only recommended with certain risk factors.      Here is a list of your current Health Maintenance items (your personalized list of preventive services) with a due date:  Health Maintenance Due   Topic Date Due    Pneumococcal Vaccine (1 of 2 - PPSV23) Never done    Eye Exam  Never done    COVID-19 Vaccine (1) Never done    Hemoglobin A1C    08/18/2021

## 2021-08-20 LAB
ALBUMIN SERPL-MCNC: 4 G/DL (ref 3.8–4.8)
ALBUMIN/GLOB SERPL: 0.9 {RATIO} (ref 1.2–2.2)
ALP SERPL-CCNC: 124 IU/L (ref 48–121)
ALT SERPL-CCNC: 81 IU/L (ref 0–32)
AST SERPL-CCNC: 67 IU/L (ref 0–40)
BILIRUB SERPL-MCNC: 0.3 MG/DL (ref 0–1.2)
BUN SERPL-MCNC: 5 MG/DL (ref 6–20)
BUN/CREAT SERPL: 5 (ref 9–23)
CALCIUM SERPL-MCNC: 9 MG/DL (ref 8.7–10.2)
CHLORIDE SERPL-SCNC: 102 MMOL/L (ref 96–106)
CHOLEST SERPL-MCNC: 158 MG/DL (ref 100–199)
CO2 SERPL-SCNC: 23 MMOL/L (ref 20–29)
CREAT SERPL-MCNC: 0.98 MG/DL (ref 0.57–1)
DRUGS UR: NORMAL
GLOBULIN SER CALC-MCNC: 4.4 G/DL (ref 1.5–4.5)
GLUCOSE SERPL-MCNC: 105 MG/DL (ref 65–99)
HBA1C MFR BLD: 8.9 % (ref 4.8–5.6)
HDLC SERPL-MCNC: 43 MG/DL
IMP & REVIEW OF LAB RESULTS: NORMAL
LDLC SERPL CALC-MCNC: 93 MG/DL (ref 0–99)
POTASSIUM SERPL-SCNC: 4.1 MMOL/L (ref 3.5–5.2)
PROT SERPL-MCNC: 8.4 G/DL (ref 6–8.5)
SODIUM SERPL-SCNC: 140 MMOL/L (ref 134–144)
TRIGL SERPL-MCNC: 124 MG/DL (ref 0–149)
VLDLC SERPL CALC-MCNC: 22 MG/DL (ref 5–40)

## 2021-09-01 ENCOUNTER — TELEPHONE (OUTPATIENT)
Dept: INTERNAL MEDICINE CLINIC | Age: 31
End: 2021-09-01

## 2021-09-01 DIAGNOSIS — Z79.891 LONG TERM (CURRENT) USE OF OPIATE ANALGESIC: Primary | ICD-10-CM

## 2021-09-01 NOTE — TELEPHONE ENCOUNTER
Please return call to patient regarding her recent drug screen results please return call to patient when available

## 2021-09-02 NOTE — TELEPHONE ENCOUNTER
Called pt and left message. Call back number left and I myself or one of the other nurses will attempt to contact again. The call was to follow up about drug screening inquiry. GoWarhart message sent.

## 2021-09-02 NOTE — TELEPHONE ENCOUNTER
Patient called back and stated she has never done cocaine.  She would like to retest  She can be reached after 100 pm @ [phone]    Please advise

## 2021-09-03 NOTE — TELEPHONE ENCOUNTER
Spoke with pt in regards to lab retesting, two pt identifier's verified. Delyaed the Dr's note. Pt is agreeable and voices no concerns at this time.

## 2021-09-07 ENCOUNTER — HOSPITAL ENCOUNTER (OUTPATIENT)
Dept: LAB | Age: 31
Discharge: HOME | End: 2021-09-07

## 2021-09-07 LAB — XX-LABCORP SPECIMEN COL,LCBCF: NORMAL

## 2021-09-07 PROCEDURE — 99001 SPECIMEN HANDLING PT-LAB: CPT

## 2021-09-09 LAB — DRUGS UR: NORMAL

## 2021-09-14 ENCOUNTER — TELEPHONE (OUTPATIENT)
Dept: INTERNAL MEDICINE CLINIC | Age: 31
End: 2021-09-14

## 2021-09-14 DIAGNOSIS — J06.9 UPPER RESPIRATORY TRACT INFECTION, UNSPECIFIED TYPE: Primary | ICD-10-CM

## 2021-09-14 NOTE — TELEPHONE ENCOUNTER
I did not see a COVID test in the ER note from 9/9/21  The note states at 2:13 AM she left without being seen.   Needs a virtual visit

## 2021-09-14 NOTE — TELEPHONE ENCOUNTER
Patient states she was in the ED over the weekend and diagnosed with a URI but was not given any medication. States it is getting worse and wants to know if you can prescribe something. States he COVID test was Negative.

## 2021-09-15 RX ORDER — AZITHROMYCIN 250 MG/1
TABLET, FILM COATED ORAL
Qty: 6 TABLET | Refills: 0 | Status: SHIPPED | OUTPATIENT
Start: 2021-09-15 | End: 2021-09-20

## 2021-09-15 NOTE — TELEPHONE ENCOUNTER
S/w the pt and informed she went to [facility], not [facility]. Chart reviewed and confirmed:    SARS-COV-2, [place]  Order: [id number]  Collected:  9/10/2021 09:00 Status:  Final result  Specimen Information: NASOPHARYNGEAL SWAB      0 Result Notes   Ref Range & Units 9/10/21 0900  SARS-CoV-2, [place] Not Detected   Not Detected     Will notify.

## 2021-09-20 DIAGNOSIS — M54.50 CHRONIC LOW BACK PAIN WITHOUT SCIATICA, UNSPECIFIED BACK PAIN LATERALITY: ICD-10-CM

## 2021-09-20 DIAGNOSIS — G89.29 CHRONIC LOW BACK PAIN WITHOUT SCIATICA, UNSPECIFIED BACK PAIN LATERALITY: ICD-10-CM

## 2021-09-20 DIAGNOSIS — Z79.891 LONG TERM (CURRENT) USE OF OPIATE ANALGESIC: ICD-10-CM

## 2021-09-20 DIAGNOSIS — Z76.0 MEDICATION REFILL: ICD-10-CM

## 2021-09-20 RX ORDER — HYDROCODONE BITARTRATE AND ACETAMINOPHEN 5; 325 MG/1; MG/1
1 TABLET ORAL
Qty: 120 TABLET | Refills: 0 | Status: SHIPPED | OUTPATIENT
Start: 2021-09-20 | End: 2021-10-18 | Stop reason: SDUPTHER

## 2021-09-20 RX ORDER — FLUTICASONE PROPIONATE 50 MCG
2 SPRAY, SUSPENSION (ML) NASAL DAILY
Qty: 1 EACH | Refills: 5 | Status: SHIPPED | OUTPATIENT
Start: 2021-09-20 | End: 2022-02-10 | Stop reason: SDUPTHER

## 2021-10-18 DIAGNOSIS — Z76.0 MEDICATION REFILL: ICD-10-CM

## 2021-10-18 DIAGNOSIS — M54.50 CHRONIC LOW BACK PAIN WITHOUT SCIATICA, UNSPECIFIED BACK PAIN LATERALITY: ICD-10-CM

## 2021-10-18 DIAGNOSIS — G89.29 CHRONIC LOW BACK PAIN WITHOUT SCIATICA, UNSPECIFIED BACK PAIN LATERALITY: ICD-10-CM

## 2021-10-18 DIAGNOSIS — Z79.891 LONG TERM (CURRENT) USE OF OPIATE ANALGESIC: ICD-10-CM

## 2021-10-18 RX ORDER — HYDROCODONE BITARTRATE AND ACETAMINOPHEN 5; 325 MG/1; MG/1
1 TABLET ORAL
Qty: 120 TABLET | Refills: 0 | Status: SHIPPED | OUTPATIENT
Start: 2021-10-18 | End: 2021-11-16 | Stop reason: SDUPTHER

## 2021-10-18 NOTE — TELEPHONE ENCOUNTER
Patient request refill  Requested Prescriptions     Pending Prescriptions Disp Refills    HYDROcodone-acetaminophen (NORCO) 5-325 mg per tablet 120 Tablet 0     Sig: Take 1 Tablet by mouth every six (6) hours as needed for Pain for up to 30 days. Max Daily Amount: 4 Tablets.

## 2021-11-05 ENCOUNTER — PATIENT OUTREACH (OUTPATIENT)
Dept: CASE MANAGEMENT | Age: 31
End: 2021-11-05

## 2021-11-05 NOTE — PROGRESS NOTES
Complex Case Management      Date/Time:  2021 4:37 PM    Method of communication with patient:phone    [facility] ([facility]) contacted the patient by telephone to perform Ambulatory Care Coordination. Verified name and  (PHI) with patient as identifiers. Provided introduction to self, and explanation of the Ambulatory Care Manager's role. Patient declined CCM.

## 2021-11-16 DIAGNOSIS — M54.50 CHRONIC LOW BACK PAIN WITHOUT SCIATICA, UNSPECIFIED BACK PAIN LATERALITY: ICD-10-CM

## 2021-11-16 DIAGNOSIS — Z79.891 LONG TERM (CURRENT) USE OF OPIATE ANALGESIC: ICD-10-CM

## 2021-11-16 DIAGNOSIS — G89.29 CHRONIC LOW BACK PAIN WITHOUT SCIATICA, UNSPECIFIED BACK PAIN LATERALITY: ICD-10-CM

## 2021-11-16 DIAGNOSIS — Z76.0 MEDICATION REFILL: ICD-10-CM

## 2021-11-18 RX ORDER — HYDROCODONE BITARTRATE AND ACETAMINOPHEN 5; 325 MG/1; MG/1
1 TABLET ORAL
Qty: 120 TABLET | Refills: 0 | Status: SHIPPED | OUTPATIENT
Start: 2021-11-18 | End: 2021-12-17 | Stop reason: SDUPTHER

## 2021-11-18 RX ORDER — METFORMIN HYDROCHLORIDE 500 MG/1
500 TABLET ORAL 2 TIMES DAILY WITH MEALS
Qty: 60 TABLET | Refills: 5 | Status: SHIPPED | OUTPATIENT
Start: 2021-11-18 | End: 2022-05-29

## 2021-11-18 RX ORDER — FAMOTIDINE 20 MG/1
20 TABLET, FILM COATED ORAL DAILY
Qty: 90 TABLET | Refills: 5 | Status: SHIPPED | OUTPATIENT
Start: 2021-11-18 | End: 2022-02-10 | Stop reason: SDUPTHER

## 2021-11-18 NOTE — TELEPHONE ENCOUNTER
Requested Prescriptions     Pending Prescriptions Disp Refills    famotidine (Pepcid) 20 mg tablet 90 Tablet 5     Sig: Take 1 Tablet by mouth daily.  metFORMIN (GLUCOPHAGE) 500 mg tablet 60 Tablet 5     Sig: Take 1 Tablet by mouth two (2) times daily (with meals).  HYDROcodone-acetaminophen (NORCO) 5-325 mg per tablet 120 Tablet 0     Sig: Take 1 Tablet by mouth every six (6) hours as needed for Pain for up to 30 days. Max Daily Amount: 4 Tablets.

## 2021-12-10 RX ORDER — GLIPIZIDE 5 MG/1
TABLET, FILM COATED, EXTENDED RELEASE ORAL
Qty: 90 TABLET | Refills: 5 | Status: SHIPPED | OUTPATIENT
Start: 2021-12-10 | End: 2022-08-02

## 2021-12-17 DIAGNOSIS — G89.29 CHRONIC LOW BACK PAIN WITHOUT SCIATICA, UNSPECIFIED BACK PAIN LATERALITY: ICD-10-CM

## 2021-12-17 DIAGNOSIS — M54.50 CHRONIC LOW BACK PAIN WITHOUT SCIATICA, UNSPECIFIED BACK PAIN LATERALITY: ICD-10-CM

## 2021-12-17 DIAGNOSIS — Z79.891 LONG TERM (CURRENT) USE OF OPIATE ANALGESIC: ICD-10-CM

## 2021-12-17 DIAGNOSIS — Z76.0 MEDICATION REFILL: ICD-10-CM

## 2021-12-17 RX ORDER — HYDROCODONE BITARTRATE AND ACETAMINOPHEN 5; 325 MG/1; MG/1
1 TABLET ORAL
Qty: 120 TABLET | Refills: 0 | Status: SHIPPED | OUTPATIENT
Start: 2021-12-17 | End: 2022-01-18 | Stop reason: SDUPTHER

## 2022-01-19 ENCOUNTER — OFFICE VISIT (OUTPATIENT)
Dept: CARDIOLOGY CLINIC | Age: 32
End: 2022-01-19
Payer: MEDICARE

## 2022-01-19 VITALS
TEMPERATURE: 97.2 F | BODY MASS INDEX: 49.13 KG/M2 | WEIGHT: 260 LBS | HEART RATE: 93 BPM | RESPIRATION RATE: 18 BRPM | OXYGEN SATURATION: 99 % | SYSTOLIC BLOOD PRESSURE: 141 MMHG | DIASTOLIC BLOOD PRESSURE: 89 MMHG

## 2022-01-19 DIAGNOSIS — I42.0 DILATED CARDIOMYOPATHY (HCC): Primary | ICD-10-CM

## 2022-01-19 DIAGNOSIS — R06.02 SHORTNESS OF BREATH: ICD-10-CM

## 2022-01-19 PROCEDURE — G8417 CALC BMI ABV UP PARAM F/U: HCPCS | Performed by: INTERNAL MEDICINE

## 2022-01-19 PROCEDURE — G8427 DOCREV CUR MEDS BY ELIG CLIN: HCPCS | Performed by: INTERNAL MEDICINE

## 2022-01-19 PROCEDURE — G8510 SCR DEP NEG, NO PLAN REQD: HCPCS | Performed by: INTERNAL MEDICINE

## 2022-01-19 PROCEDURE — 99214 OFFICE O/P EST MOD 30 MIN: CPT | Performed by: INTERNAL MEDICINE

## 2022-01-19 NOTE — TELEPHONE ENCOUNTER
PCP: Tali Garner MD    Last appt: 1/19/2022  Future Appointments   Date Time Provider Janie Lara   2/7/2022  2:30 PM [place], Eleanore Mortimer, MD [facility] BS AMB       Requested Prescriptions     Pending Prescriptions Disp Refills    empagliflozin (JARDIANCE) 25 mg tablet 30 Tablet 5     Sig: Take 1 Tablet by mouth daily.

## 2022-01-19 NOTE — PROGRESS NOTES
Identified pt with two pt identifiers(name and ). Reviewed record in preparation for visit and have obtained necessary documentation. Rui Steven presents today for   Chief Complaint   Patient presents with    Follow-up     4m       Pt denies  DIZZINESS, SOB, CHEST PAIN/ PRESSURE, FATIGUE/WEAKNESS, HEADACHES, SWELLING. Muna Orta preferred language for health care discussion is english/other. Personal Protective Equipment:   Personal Protective Equipment was used including: mask-surgical and hands-gloves. Patient was placed on no precaution(s). Patient was masked. Precautions:   Patient currently on None  Patient currently roomed with door closed. Is someone accompanying this pt? no    Is the patient using any DME equipment during [address]? no    Depression Screening:  3 most recent PHQ Screens 2022   PHQ Not Done -   Little interest or pleasure in doing things Not at all   Feeling down, depressed, irritable, or hopeless Not at all   Total Score PHQ 2 0       Learning Assessment:  Learning Assessment 2015   PRIMARY LEARNER Patient   HIGHEST LEVEL OF EDUCATION - PRIMARY LEARNER  DID NOT GRADUATE HIGH SCHOOL   BARRIERS PRIMARY LEARNER NONE   CO-LEARNER CAREGIVER No   PRIMARY LANGUAGE ENGLISH   LEARNER PREFERENCE PRIMARY READING   ANSWERED BY patient   RELATIONSHIP SELF       Abuse Screening:  Abuse Screening Questionnaire 2021   Do you ever feel afraid of your partner? N   Are you in a relationship with someone who physically or mentally threatens you? N   Is it safe for you to go home? Y       Fall Risk  No flowsheet data found. Pt currently taking Anticoagulant therapy? no  Pt currently taking Antiplatelet therapy? No     Coordination of Care:  1. Have you been to the ER, urgent care clinic since your last visit? Hospitalized since your last visit? yes    2.  Have you seen or consulted any other health care providers outside of the [address] [place] [address] since your last visit? Include any pap smears or colon screening. no      Please see Red banners under Allergies and Med Rec to remove outside inquires. All correct information has been verified with patient and added to chart.      Medication's patient's would liked removed has been marked not taking to be removed per Verbal order and read back per Selene Pacheco MD

## 2022-01-29 NOTE — PROGRESS NOTES
Subjective:      Muna is in the office today for cardiac reevaluation. She is a 60-year-old diabetic woman with history of systolic heart failure. Her last echocardiogram was done in October 2020 which demonstrated an ejection fraction of 38%. She also grade 1 diastolic dysfunction. She was hospitalized at [facility] for acute CHF in the past. She was seen in the [facility] ED on 2/9/2021. At that time she presented with chest pain and a cough. She was evaluated and discharged on antibiotics and steroids and was felt to have bronchitis at that time. She was also seen in the ED at [facility] in 2021 for evaluation of chest pain. She was ruled out and discharged for follow-up. The patient had a previous cardiac CTA which did not demonstrate any significant obstructive coronary disease. In the office today, she reports she is doing [place] \". She has had no chest pain or shortness of breath. She reports no limiting shortness of breath. She has had a burning sensation in one of her feet. She follows up with Dr. Moses Valdez on 2/7/2022. Patient's cardiac risk factors are smoking/ tobacco exposure, diabetes mellitus. Patient Active Problem List    Diagnosis Date Noted    Dilated cardiomyopathy ([facility] 03/24/2021    Tobacco abuse 94/66/0859    Acute systolic heart failure ([facility] 10/16/2020    Shortness of breath 08/29/2020    Type 2 diabetes with nephropathy ([facility] 12/17/2019    Asthma     Obesity, morbid ([facility] 11/27/2017    Controlled type 2 diabetes mellitus without complication, without long-term current use of insulin ([facility]) 06/29/2017    Nasal congestion 07/16/2015    Chronic back pain 07/16/2015    Anxiety 07/16/2015     Current Outpatient Medications   Medication Sig Dispense Refill      120 Tablet 0    methocarbamoL (ROBAXIN) 750 mg tablet Take 1 Tablet by mouth four (4) times daily.  20 Tablet 0    miconazole nitrate (Monistat 3) 200 mg- 2 % (9 gram) kit Insert  into vagina nightly.  miconazole nitrate (Monistat 7) 2 % (100 mg)- 2 % (9 gram) cpfc Insert  into vagina.  Vitamin D2 1,250 mcg (50,000 unit) capsule take 1 capsule by mouth every week 4 Cap 5    carvediloL (COREG) 6.25 mg tablet Take 1 Tab by mouth two (2) times daily (with meals). 180 Tab 3    sacubitriL-valsartan (Entresto) 49-51 mg tab tablet Take 1 Tab by mouth two (2) times a day. (Patient taking differently: Take 2 Tabs by mouth two (2) times a day.) 60 Tab 5    albuterol (PROVENTIL HFA, VENTOLIN HFA, PROAIR HFA) 90 mcg/actuation inhaler Take 2 Puffs by inhalation every six (6) hours as needed for Wheezing. 18 g 5    fluticasone propionate (Flovent HFA) 110 mcg/actuation inhaler Take 1 Puff by inhalation every twelve (12) hours. 1 Inhaler 5    predniSONE (DELTASONE) 20 mg tablet Take 3 tablets by mouth for three days, then 2 for 3 days, 1 for 3 days 18 Tab 0    metFORMIN (GLUCOPHAGE) 500 mg tablet take 1 tablet by mouth twice a day with meals 60 Tab 5    empagliflozin (JARDIANCE) 25 mg tablet Take 1 Tab by mouth daily. 30 Tab 5    aspirin delayed-release 81 mg tablet Take 1 Tab by mouth daily. 100 Tab 5    atorvastatin (LIPITOR) 40 mg tablet Take 1 Tab by mouth nightly. 30 Tab 5    insulin lispro protamin-lispro (HUMALOG 75-25 MIX) flexpen 30 Units by SubCUTAneous route Before breakfast and dinner. 5 Pen 5    fluticasone propionate (FLONASE) 50 mcg/actuation nasal spray 2 Sprays by Both Nostrils route daily. 1 Bottle 5    buPROPion SR (WELLBUTRIN SR) 150 mg SR tablet Take 1 Tab by mouth two (2) times a day. 60 Tab 5    loratadine (CLARITIN REDITABS) 10 mg dissolvable tablet Take 1 Tab by mouth daily. 30 Tab 5    albuterol (PROVENTIL VENTOLIN) 2.5 mg /3 mL (0.083 %) nebu 3 mL by Nebulization route three (3) times daily as needed (wheezing). Indications: bronchospasm 100 Each 5    famotidine (PEPCID) 20 mg tablet Take 1 Tab by mouth daily.  (Patient taking differently: Take 20 mg by mouth as needed.) 90 Tab 5    diphenhydrAMINE (BENADRYL) 25 mg capsule 25 mg every six (6) hours as needed. Allergies   Allergen Reactions    Other Food Anaphylaxis     [place], guacamole    Argan Kernal Oil (Arganina Spinosa) Hives    Naproxen Swelling    Percocet [Oxycodone-Acetaminophen] Itching     Past Medical History:   Diagnosis Date    ASCUS (atypical squamous cells of undetermined significance) on Pap smear 3/11    Asthma     B[facility]     Cardiomyopathy ([facility] 10/2020    EF 38% on echo. Global hypokinesis. Cardiac CT no significant obstructive disease    Chlamydia      \"    Chronic back pain     COVID-19 02/09/2021    Depression     Diabetes mellitus ([facility] 01/02/2017    Elevated blood sugar     GC (gonococcus infection) 2007/ 2004    Heart failure (HCC)     HPV (human papilloma virus) infection 3/11    Irregular menses     Schizophrenia ([facility]     Sleep apnea 2/14    Smoker     Suicide attempt ([facility] 5/08    with tylenol    Suicide attempt ([facility] 10/09    Trichomonal vaginitis 8/10    Uterine fibroid      No past surgical history on file.   Family History   Problem Relation Age of Onset    Attention Deficit Hyperactivity Disorder Brother     Bipolar Disorder Brother     Seizures Sister     Hypertension Sister     Other Sister         substance abuse     Social History     Tobacco Use   Smoking Status Current Every Day Smoker    Packs/day: 0.25    Years: 9.00    Pack years: 2.25    Types: Cigarettes    Start date: 2004   Smokeless Tobacco Never Used   Tobacco Comment    unable/unwilling to quit at this time          Review of Systems, additional:  Constitutional: negative  Eyes: negative  Respiratory: positive for wheezing  Cardiovascular: negative  Gastrointestinal: negative  Musculoskeletal:negative  Neurological: negative  Behvioral/Psych: negative  Endocrine: negative  ENT: negative    Objective:     Visit Vitals  BP (!) 141/89   Pulse 93   Temp 97.2 °F (36.2 °C) (Temporal)   Resp 18   Wt 117.9 kg (260 lb)   SpO2 99%   BMI 49.13 kg/m²     General:  alert, cooperative, no distress   Chest Wall: inspection normal - no chest wall deformities or tenderness, respiratory effort normal   Lung: clear to auscultation bilaterally   Heart:  normal rate and regular rhythm, S1 and S2 normal, no murmurs noted, no gallops noted, no JVD   Abdomen: soft, non-tender. Bowel sounds normal. No masses,  no organomegaly   Extremities: extremities normal, atraumatic, no cyanosis or edema Skin: no rashes   Neuro: alert, oriented, normal speech, no focal findings or movement disorder noted     [id number]. Sinus rhythm. Normal.    Assessment/Plan:       ICD-10-CM ICD-9-CM    1. Type 2 diabetes with nephropathy (HCC)  E11.21 250.40      583.81    2. Tobacco abuse  Z72.0 305.1    3. Dilated cardiomyopathy ([facility] , last echo 10/16/2020. EF 38%. Moderate global hypokinesis. Stage I diastolic dysfunction. Continue Entresto  49/51 twice daily. Continue carvedilol 6.25 twice daily. Return in 4 months and will have an echocardiogram on the same day. Might consider the addition of [place] or Jardiance. I42.0 425.4    4. Anxiety  F41.9 300.00    5. CADY, diagnosed , prescribed CPAP in the past      6       Chest pain, [facility] ED visit 2021. No significant ECG changes.   Patient observed, biomarkers checked, discharged for follow-up

## 2022-02-10 ENCOUNTER — OFFICE VISIT (OUTPATIENT)
Dept: INTERNAL MEDICINE CLINIC | Age: 32
End: 2022-02-10
Payer: MEDICARE

## 2022-02-10 ENCOUNTER — PATIENT MESSAGE (OUTPATIENT)
Dept: NEUROLOGY | Age: 32
End: 2022-02-10

## 2022-02-10 VITALS
WEIGHT: 256 LBS | DIASTOLIC BLOOD PRESSURE: 90 MMHG | BODY MASS INDEX: 48.33 KG/M2 | SYSTOLIC BLOOD PRESSURE: 139 MMHG | HEART RATE: 89 BPM | RESPIRATION RATE: 19 BRPM | HEIGHT: 61 IN | OXYGEN SATURATION: 93 % | TEMPERATURE: 96.9 F

## 2022-02-10 DIAGNOSIS — M79.672 LEFT FOOT PAIN: ICD-10-CM

## 2022-02-10 DIAGNOSIS — R20.2 NUMBNESS OR TINGLING: ICD-10-CM

## 2022-02-10 DIAGNOSIS — G89.29 CHRONIC LOW BACK PAIN WITHOUT SCIATICA, UNSPECIFIED BACK PAIN LATERALITY: ICD-10-CM

## 2022-02-10 DIAGNOSIS — E11.21 TYPE 2 DIABETES WITH NEPHROPATHY (HCC): Primary | ICD-10-CM

## 2022-02-10 DIAGNOSIS — I42.0 DILATED CARDIOMYOPATHY (HCC): ICD-10-CM

## 2022-02-10 DIAGNOSIS — M54.50 CHRONIC LOW BACK PAIN WITHOUT SCIATICA, UNSPECIFIED BACK PAIN LATERALITY: ICD-10-CM

## 2022-02-10 DIAGNOSIS — Z76.0 MEDICATION REFILL: ICD-10-CM

## 2022-02-10 DIAGNOSIS — R20.0 NUMBNESS OR TINGLING: ICD-10-CM

## 2022-02-10 DIAGNOSIS — J45.20 INTERMITTENT ASTHMA WITHOUT COMPLICATION, UNSPECIFIED ASTHMA SEVERITY: ICD-10-CM

## 2022-02-10 PROCEDURE — 2022F DILAT RTA XM EVC RTNOPTHY: CPT | Performed by: INTERNAL MEDICINE

## 2022-02-10 PROCEDURE — 3046F HEMOGLOBIN A1C LEVEL >9.0%: CPT | Performed by: INTERNAL MEDICINE

## 2022-02-10 PROCEDURE — G8510 SCR DEP NEG, NO PLAN REQD: HCPCS | Performed by: INTERNAL MEDICINE

## 2022-02-10 PROCEDURE — G8427 DOCREV CUR MEDS BY ELIG CLIN: HCPCS | Performed by: INTERNAL MEDICINE

## 2022-02-10 PROCEDURE — 99214 OFFICE O/P EST MOD 30 MIN: CPT | Performed by: INTERNAL MEDICINE

## 2022-02-10 PROCEDURE — G8417 CALC BMI ABV UP PARAM F/U: HCPCS | Performed by: INTERNAL MEDICINE

## 2022-02-10 RX ORDER — FLUTICASONE PROPIONATE 50 MCG
2 SPRAY, SUSPENSION (ML) NASAL DAILY
Qty: 1 EACH | Refills: 5 | Status: SHIPPED | OUTPATIENT
Start: 2022-02-10

## 2022-02-10 RX ORDER — ATORVASTATIN CALCIUM 40 MG/1
40 TABLET, FILM COATED ORAL
Qty: 30 TABLET | Refills: 5 | Status: SHIPPED | OUTPATIENT
Start: 2022-02-10 | End: 2022-08-12

## 2022-02-10 RX ORDER — FAMOTIDINE 20 MG/1
20 TABLET, FILM COATED ORAL DAILY
Qty: 90 TABLET | Refills: 5 | Status: SHIPPED | OUTPATIENT
Start: 2022-02-10

## 2022-02-10 NOTE — PROGRESS NOTES
HISTORY OF PRESENT ILLNESS  Mario Stewart is a 32 y.o. female. BP (!) 139/90 (BP 1 Location: Right arm, BP Patient Position: Sitting, BP Cuff Size: Large adult)   Pulse 89   Temp 96.9 °F (36.1 °C) (Temporal)   Resp 19   Ht 5' 1\" (1.549 m)   Wt 256 lb (116.1 kg)   SpO2 93%   BMI 48.37 kg/m²     H/o recurrent ankle sprains  Reports burning and tingling in her left foot off and on    She has been advised to wear high top boots and/or ankle braces        She reports she will behaving additional hear testing in May    She also will f/u with sleep specialist  It has been suggested she might benefit from a T&A. Diabetes  The history is provided by the patient. This is a chronic problem. The current episode started more than 1 week ago. The problem occurs daily. The problem has not changed since onset. Pertinent negatives include no chest pain, no headaches and no shortness of breath. Foot Pain  The history is provided by the patient (left foot pain and tingling  when pressed in a certain spot). This is a new problem. Pertinent negatives include no chest pain, no headaches and no shortness of breath. Review of Systems   Constitutional: Negative for chills and fever. Respiratory: Negative for cough and shortness of breath. Cardiovascular: Negative for chest pain and palpitations. Neurological: Negative for dizziness and headaches. Physical Exam  Vitals and nursing note reviewed. Constitutional:       Appearance: Normal appearance. She is well-developed. HENT:      Head: Normocephalic and atraumatic. Cardiovascular:      Rate and Rhythm: Normal rate and regular rhythm. Pulmonary:      Effort: Pulmonary effort is normal.      Breath sounds: Normal breath sounds. Musculoskeletal:      Right lower leg: No edema. Left lower leg: No edema. Feet:    Skin:     General: Skin is warm and dry. Neurological:      General: No focal deficit present.       Mental Status: She is alert and oriented to person, place, and time. Psychiatric:         Mood and Affect: Mood normal.         Behavior: Behavior normal.         ASSESSMENT and PLAN    ICD-10-CM ICD-9-CM    1. Type 2 diabetes with nephropathy ([facility])  K66.26 414.87 METABOLIC PANEL, COMPREHENSIVE     583.81 LIPID PANEL      HEMOGLOBIN A1C W/O EAG   2. Dilated cardiomyopathy ([facility])  I42.0 425.4    3. Intermittent asthma without complication, unspecified asthma severity  J45.20 493.90    4. Chronic low back pain without sciatica, unspecified back pain laterality  M54.50 724.2     G89.29 338.29    5. Body mass index (BMI) 45.0-49.9, adult ([facility])  Z68.42 V85.42    6. Left foot pain  M79.672 729.5 REFERRAL TO NEUROLOGY   7. Numbness or tingling  R20.0 782.0 REFERRAL TO NEUROLOGY    R20.2     8. Medication refill  Z76.0 V68.1 fluticasone propionate (FLONASE) 50 mcg/actuation nasal spray      famotidine (Pepcid) 20 mg tablet       Update lab today    BP looks good    Discussed BMI/weight, lifestyle, diet and exercise. Discussed effect on blood pressure, blood sugar, and joints especially  Focus on limiting white carbs, portion control, and moving more. Wt down 4#    Refills as noted  Discussed tapering her norco. Will start next refill.     F/u with specialists for now    F/u 4 months for HTN, DM

## 2022-02-10 NOTE — PROGRESS NOTES
1. \"Have you been to the ER, urgent care clinic since your last visit? Hospitalized since your last visit? \" No    2. \"Have you seen or consulted any other health care providers outside of the [address] [place] [address] since your last visit? \" No     3. For patients aged 39-70: Has the patient had a colonoscopy / FIT/ Cologuard? NA - based on age      If the patient is female:    4. For patients aged 41-77: Has the patient had a mammogram within the past 2 years? NA - based on age or sex      11. For patients aged 21-65: Has the patient had a pap smear? Yes - no Care Gap present      Patient declines the influenza, pneumococcal and COVID-19 vaccines at this time.

## 2022-02-11 DIAGNOSIS — E11.21 TYPE 2 DIABETES WITH NEPHROPATHY (HCC): ICD-10-CM

## 2022-02-11 DIAGNOSIS — R79.89 ELEVATED LFTS: Primary | ICD-10-CM

## 2022-02-11 LAB
ALBUMIN SERPL-MCNC: 4.1 G/DL (ref 3.8–4.8)
ALBUMIN/GLOB SERPL: 0.9 {RATIO} (ref 1.2–2.2)
ALP SERPL-CCNC: 120 IU/L (ref 44–121)
ALT SERPL-CCNC: 133 IU/L (ref 0–32)
AST SERPL-CCNC: 135 IU/L (ref 0–40)
BILIRUB SERPL-MCNC: 0.4 MG/DL (ref 0–1.2)
BUN SERPL-MCNC: 7 MG/DL (ref 6–20)
BUN/CREAT SERPL: 7 (ref 9–23)
CALCIUM SERPL-MCNC: 9.2 MG/DL (ref 8.7–10.2)
CHLORIDE SERPL-SCNC: 101 MMOL/L (ref 96–106)
CHOLEST SERPL-MCNC: 161 MG/DL (ref 100–199)
CO2 SERPL-SCNC: 21 MMOL/L (ref 20–29)
CREAT SERPL-MCNC: 0.98 MG/DL (ref 0.57–1)
GLOBULIN SER CALC-MCNC: 4.5 G/DL (ref 1.5–4.5)
GLUCOSE SERPL-MCNC: 113 MG/DL (ref 65–99)
HBA1C MFR BLD: 9.5 % (ref 4.8–5.6)
HDLC SERPL-MCNC: 41 MG/DL
IMP & REVIEW OF LAB RESULTS: NORMAL
LDLC SERPL CALC-MCNC: 99 MG/DL (ref 0–99)
POTASSIUM SERPL-SCNC: 3.9 MMOL/L (ref 3.5–5.2)
PROT SERPL-MCNC: 8.6 G/DL (ref 6–8.5)
SODIUM SERPL-SCNC: 140 MMOL/L (ref 134–144)
TRIGL SERPL-MCNC: 113 MG/DL (ref 0–149)
VLDLC SERPL CALC-MCNC: 21 MG/DL (ref 5–40)

## 2022-02-13 DIAGNOSIS — B37.31 CANDIDA VAGINITIS: ICD-10-CM

## 2022-02-13 DIAGNOSIS — Z76.0 MEDICATION REFILL: ICD-10-CM

## 2022-02-13 RX ORDER — FLUCONAZOLE 200 MG/1
TABLET ORAL
Qty: 3 TABLET | Refills: 2 | Status: SHIPPED | OUTPATIENT
Start: 2022-02-13 | End: 2022-06-06 | Stop reason: SDUPTHER

## 2022-02-17 DIAGNOSIS — R09.89 CHEST CONGESTION: ICD-10-CM

## 2022-02-17 DIAGNOSIS — Z76.0 MEDICATION REFILL: ICD-10-CM

## 2022-02-17 DIAGNOSIS — Z79.891 LONG TERM (CURRENT) USE OF OPIATE ANALGESIC: ICD-10-CM

## 2022-02-17 DIAGNOSIS — M54.50 CHRONIC LOW BACK PAIN WITHOUT SCIATICA, UNSPECIFIED BACK PAIN LATERALITY: ICD-10-CM

## 2022-02-17 DIAGNOSIS — G89.29 CHRONIC LOW BACK PAIN WITHOUT SCIATICA, UNSPECIFIED BACK PAIN LATERALITY: ICD-10-CM

## 2022-02-17 DIAGNOSIS — R09.81 NASAL CONGESTION: ICD-10-CM

## 2022-02-17 RX ORDER — FAMOTIDINE 20 MG/1
20 TABLET, FILM COATED ORAL DAILY
Qty: 90 TABLET | Refills: 5 | Status: CANCELLED | OUTPATIENT
Start: 2022-02-17

## 2022-02-18 RX ORDER — LORATADINE 10 MG
10 TABLET,DISINTEGRATING ORAL DAILY
Qty: 30 TABLET | Refills: 5 | Status: SHIPPED | OUTPATIENT
Start: 2022-02-18

## 2022-02-18 RX ORDER — ERGOCALCIFEROL 1.25 MG/1
50000 CAPSULE ORAL
Qty: 4 CAPSULE | Refills: 5 | Status: SHIPPED | OUTPATIENT
Start: 2022-02-18

## 2022-02-18 RX ORDER — HYDROCODONE BITARTRATE AND ACETAMINOPHEN 5; 325 MG/1; MG/1
1 TABLET ORAL
Qty: 120 TABLET | Refills: 0 | Status: SHIPPED | OUTPATIENT
Start: 2022-02-18 | End: 2022-03-17 | Stop reason: SDUPTHER

## 2022-02-28 ENCOUNTER — PATIENT OUTREACH (OUTPATIENT)
Dept: CASE MANAGEMENT | Age: 32
End: 2022-02-28

## 2022-02-28 NOTE — PROGRESS NOTES
Complex Case Management      Date/Time:  2022 4:00 PM    Method of communication with patient:phone    [facility]) contacted the patient by telephone to perform Ambulatory Care Coordination. Verified name and  (PHI) with patient as identifiers. Provided introduction to self, and explanation of the Ambulatory Care Manager's role. Reviewed most recent clinic visit w/ patient who verbalized understanding. Patient given an opportunity to ask questions. Top Challenges reviewed with the patient   1. Reports that when she contacted neurologist she was told that they are not scheduling new patients until . States she will call endocrinologist about scheduling appointment. Last A1c (2/10/22) was 9.5.   2. States she has been without strips for glucometer last 3-4 days but her last FFSBS was 123. Instructed to notify pharmacy of need for strips when she is down to last week and half of strips so that she will not have this type of gap in monitoring BS, verbalizes understanding. 3. Is attempting to stop smoking, was a 1.5 ppd smoker but is now down to 0.5 ppd.   4. Is wearing compression sock on left foot to help stabilize ankle and decrease burning/tingling. The patient agrees to contact the PCP office or the [facility] for questions related to their healthcare. Provided contact information for future reference. Disease Specific:   CHF - states she was never told to weigh self daily. Denies edema in legs, ankles, fingers or feeling of fullness around abdomen. Denies chest pain, difficulty breathing when lying down. COPD - states she has shortness of breath with exercise, has an occasional cough    Home Health Active: No    DME Active: No    Barriers to care?  Lack of knowledge about diseases    Advance Care Planning:   Does patient have an Advance Directive:  not on file; education provided     Medication(s):   Medication reconciliation was performed with patient, who verbalizes understanding of administration of home medications. There were no barriers to obtaining medications identified at this time. Referral to Pharm D needed: no     Current Outpatient Medications   Medication Sig    loratadine (CLARITIN REDITABS) 10 mg dissolvable tablet Take 1 Tablet by mouth daily.  ergocalciferol (Vitamin D2) 1,250 mcg (50,000 unit) capsule Take 1 Capsule by mouth every seven (7) days.  HYDROcodone-acetaminophen (NORCO) 5-325 mg per tablet Take 1 Tablet by mouth every six (6) hours as needed for Pain for up to 30 days. Max Daily Amount: 4 Tablets.  atorvastatin (LIPITOR) 40 mg tablet Take 1 Tablet by mouth nightly.  fluticasone propionate (FLONASE) 50 mcg/actuation nasal spray 2 Sprays by Both Nostrils route daily.  famotidine (Pepcid) 20 mg tablet Take 1 Tablet by mouth daily.  empagliflozin (JARDIANCE) 25 mg tablet Take 1 Tablet by mouth daily.  glipiZIDE SR (GLUCOTROL XL) 5 mg CR tablet take 1 tablet by mouth once daily    metFORMIN (GLUCOPHAGE) 500 mg tablet Take 1 Tablet by mouth two (2) times daily (with meals).  carvediloL (COREG) 6.25 mg tablet Take 1 Tab by mouth two (2) times daily (with meals).  sacubitriL-valsartan (Entresto) 49-51 mg tab tablet Take 1 Tab by mouth two (2) times a day.  albuterol (PROVENTIL HFA, VENTOLIN HFA, PROAIR HFA) 90 mcg/actuation inhaler Take 2 Puffs by inhalation every six (6) hours as needed for Wheezing.  fluticasone propionate (Flovent HFA) 110 mcg/actuation inhaler Take 1 Puff by inhalation every twelve (12) hours.  aspirin delayed-release 81 mg tablet Take 1 Tab by mouth daily.  diphenhydrAMINE (BENADRYL) 25 mg capsule 25 mg every six (6) hours as needed.     fluconazole (DIFLUCAN) 200 mg tablet take 1 tablet by mouth daily for 3 days (Patient not taking: Reported on 2/28/2022)    albuterol (PROVENTIL VENTOLIN) 2.5 mg /3 mL (0.083 %) nebu 3 mL by Nebulization route three (3) times daily as needed (wheezing). Indications: bronchospasm (Patient not taking: Reported on 2/28/2022)     No current facility-administered medications for this visit. [facility] follow up appointment(s):   Future Appointments   Date Time Provider Janie Worrelli   5/2/2022 10:00 AM [facility] [address] ECHO 1 [facility] BS AMB   5/2/2022 10:45 AM Bjorn Puri MD [facility] BS AMB   5/10/2022  9:00 AM [place], Terisa Dakin, MD [facility] BS AMB        Non-[facility] follow up appointment(s): NA    Goals Addressed                    This Visit's Progress      Coordinate pain management plan for patient. On track      Monitor back pains  Exercise to reduce exacerbation  Compliant with pain medications        Independent self-management skills (pt-stated)   No change      Patient will attend all physician appointments as scheduled    DM  Patient will begin checking blood glucose at least 2x week  Patient will continue home exercise program  Patient will follow ADA diet recommendations    Weight Loss  Patient will continue home exercise program  Patient will force fluids to 6 - 8 glasses per daily to prevent dehydration  Patient will eat healthy diet, high in vegetables and fruits, limit starch intake        Prevent Diabetes worsening   No change      Keep Schedule appointments  Eye examines  Foot examines  Keep Blood sugars below 150   Be compliant with diabetic medications  Maintain DM diet   Exercise /walk daily    Read nutritional labels to keep carbohydrates at 325 gm or less per day  Avoid concentrated sweets  Avoid white flour; choose wheat bread, pasta          Reduce risk of CHF exacerbations and complications.    On track      Keep cardiology appointments( Dr Brad Friedman)  Complaint with Medication Flako Ambrosia)  Monitor increase in weight with SOB ( potential CHF) wt daily    Patient will begin an exercise program- walking 3-4 times a week for 15 to 30 minutes (or if using step counter she will start with 2000 to 2500 steps and slowly, steadily increase to 7000 to 10,000 steps daily)  -Adhere to cardiac diet- low fat, low cholesterol, no added salt. Read nutritional labels and limit sodium to no more than 2000 mg per day  -Patient will assess her heart failure zone daily and notify physician if she is in Yellow zone    Hyla Speck zone. You are doing well. This is where you want to be. · Your weight is stable. This means it is not going up or down. · You breathe easily. · You are sleeping well. You are able to lie flat without shortness of breath. · You can do your usual activities. Yellow zone. Be careful. Your symptoms are changing. Call your doctor. · You have new or increased shortness of breath. · You are dizzy or lightheaded, or you feel like you may faint. · You have sudden weight gain, such as 3 pounds or more in 2 to 3 days. · You have increased swelling in your legs, ankles, or feet. · You are so tired or weak that you cannot do your usual activities. · You are not sleeping well. Shortness of breath wakes you up at night. You need extra pillows. Red zone. This is an emergency. Call 911. You have symptoms of sudden heart failure, such as:  · You have severe trouble breathing. · You cough up pink, foamy mucus. · You have a new irregular or fast heartbeat. You have symptoms of a heart attack. These may include:  · Chest pain or pressure, or a strange feeling in the chest.  · Sweating. · Shortness of breath. · Nausea or vomiting. · Pain, pressure, or a strange feeling in the back, neck, jaw, or upper belly or in one or both shoulders or arms. · Lightheadedness or sudden weakness. · A fast or irregular heartbeat.           Understand ASTHMA action plan. (ie. when to seek medical care, understanding green, yellow, and red zones, how and when to adjust asthma treatment)   On track      When to use nebulizer  Correct use of inhalers  CPAP at     Patient will call physician at first sign of exacerbation and before symptoms become an emergency:       -Temp > 100.5       -Increased coughing       -Sputum green, lerma or brown in color       -Increased shortness of breath       -Fatigue,      -Trouble Concentrating      -Weight Loss      -Pursed Lip Breathing      -Desire to lean forward to improve breathing        Patient will attempt to avoid exacerbation of COPD by using precautions:  -Cover nose and mouth while out in cold weather to keep breath moist and warm  -Avoid smoking  -Avoid household chemicals  -Force fluids  -Get proper rest   -Perform breathing exercises  -Eat well balanced diet  -Perform good handwashing  -Consider receiving flu vaccine

## 2022-03-07 ENCOUNTER — PATIENT OUTREACH (OUTPATIENT)
Dept: CASE MANAGEMENT | Age: 32
End: 2022-03-07

## 2022-03-07 NOTE — PROGRESS NOTES
ED Discharge Follow-Up    Date/Time:     3/7/2022 2:36 PM    Patient presented to [address] [place] [address]  ED on 3/1/22 and was diagnosed with injury of left ankle. Top Challenges reviewed with the provider   Complains that there is a place on her foot that is changing colors, has feeling (that is where it hurts), is able to wiggle toes, denies foot being cold or blue. Ankle remains swollen but she admits she has not been performing RICE therapy as she should. States she has difficulty with the crutches but says she is wearing the CAM boot. Method of communication with provider : chart routing    ACM contacted the  patient  by telephone to perform post ED discharge assessment. Verified name and  with patient as identifiers. Provided introduction to self, and explanation of the Nurse Navigator role. Patient reported assessment: I was in my sister's yard and she had a tree that had fallen. I twisted my ankle when stepped on it. Medication(s):   New Medications at Discharge: Norco 5  Changed Medications at Discharge: no  Discontinued Medications at Discharge: Motrin    There were no barriers to obtaining medications identified at this time. Reviewed discharge instructions and red flags with  patient who voiced understanding. Patient given an opportunity to ask questions and does not have any further questions or concerns at this time. The patient agrees to contact the PCP office for questions related to their healthcare. Patient reminded that there are physicians on call 24 hours a day / 7 days a week (M-F 5pm to 8am and from Friday 5pm until Monday 8am for the weekend) should the patient have questions or concerns. NN provided contact information for future reference. Offered follow up appointment with PCP: Patient states that her plan is to call PCP office and ortho office to schedule appointments.      Disease Specific:   CHF - does not weigh self, ACM instructed patient to get a scale. Denies any edema other than left ankle. Denies chest pain. COPD - denies shortness of breath at present time, states she has occasional 'smokers' cough, cough type and frequency has not changed. Referral to Pharm D needed: no     Current Outpatient Medications   Medication Sig    loratadine (CLARITIN REDITABS) 10 mg dissolvable tablet Take 1 Tablet by mouth daily.  ergocalciferol (Vitamin D2) 1,250 mcg (50,000 unit) capsule Take 1 Capsule by mouth every seven (7) days.  HYDROcodone-acetaminophen (NORCO) 5-325 mg per tablet Take 1 Tablet by mouth every six (6) hours as needed for Pain for up to 30 days. Max Daily Amount: 4 Tablets.  atorvastatin (LIPITOR) 40 mg tablet Take 1 Tablet by mouth nightly.  fluticasone propionate (FLONASE) 50 mcg/actuation nasal spray 2 Sprays by Both Nostrils route daily.  famotidine (Pepcid) 20 mg tablet Take 1 Tablet by mouth daily.  empagliflozin (JARDIANCE) 25 mg tablet Take 1 Tablet by mouth daily.  glipiZIDE SR (GLUCOTROL XL) 5 mg CR tablet take 1 tablet by mouth once daily    metFORMIN (GLUCOPHAGE) 500 mg tablet Take 1 Tablet by mouth two (2) times daily (with meals).  carvediloL (COREG) 6.25 mg tablet Take 1 Tab by mouth two (2) times daily (with meals).  sacubitriL-valsartan (Entresto) 49-51 mg tab tablet Take 1 Tab by mouth two (2) times a day.  albuterol (PROVENTIL HFA, VENTOLIN HFA, PROAIR HFA) 90 mcg/actuation inhaler Take 2 Puffs by inhalation every six (6) hours as needed for Wheezing.  fluticasone propionate (Flovent HFA) 110 mcg/actuation inhaler Take 1 Puff by inhalation every twelve (12) hours.  aspirin delayed-release 81 mg tablet Take 1 Tab by mouth daily.  albuterol (PROVENTIL VENTOLIN) 2.5 mg /3 mL (0.083 %) nebu 3 mL by Nebulization route three (3) times daily as needed (wheezing).  Indications: bronchospasm    diphenhydrAMINE (BENADRYL) 25 mg capsule 25 mg every six (6) hours as needed.  fluconazole (DIFLUCAN) 200 mg tablet take 1 tablet by mouth daily for 3 days (Patient not taking: Reported on 2/28/2022)     No current facility-administered medications for this visit. [facility] follow up appointment(s):   Future Appointments   Date Time Provider Janie Jane   5/2/2022 10:00 AM [facility] [address] [facility] ECHO 1 [facility] BS AMB   5/2/2022 10:45 AM Arnold Cabello MD [facility] BS AMB   5/10/2022  9:00 AM Logan Pendleton MD A BS AMB        Non-[facility] follow up appointment(s): NA    Goals Addressed                    This Visit's Progress      COMPLETED: Coordinate pain management plan for patient. Monitor back pains  Exercise to reduce exacerbation  Compliant with pain medications        Independent self-management skills (pt-stated)   Improving      Patient will attend all physician appointments as scheduled    DM  Patient will begin checking blood glucose at least 2x week  Patient will continue home exercise program  Patient will follow ADA diet recommendations    Weight Loss  Patient will continue home exercise program  Patient will force fluids to 6 - 8 glasses per daily to prevent dehydration  Patient will eat healthy diet, high in vegetables and fruits, limit starch intake        Prevent Diabetes worsening   On track      Keep Schedule appointments  Eye examines  Foot examines  Keep Blood sugars below 150   Be compliant with diabetic medications  Maintain DM diet   Exercise /walk daily    Read nutritional labels to keep carbohydrates at 325 gm or less per day  Avoid concentrated sweets  Avoid white flour; choose wheat bread, pasta          Reduce risk of CHF exacerbations and complications.    On track      Keep cardiology appointments( Dr Pau Ornelas)  Complaint with Medication Edinson Janine)  Monitor increase in weight with SOB ( potential CHF) wt daily    Patient will begin an exercise program- walking 3-4 times a week for 15 to 30 minutes (or if using step counter she will start with 2000 to 2500 steps and slowly, steadily increase to 7000 to 10,000 steps daily)  -Adhere to cardiac diet- low fat, low cholesterol, no added salt. Read nutritional labels and limit sodium to no more than 2000 mg per day  -Patient will assess her heart failure zone daily and notify physician if she is in Yellow zone    Eulene Saris zone. You are doing well. This is where you want to be. · Your weight is stable. This means it is not going up or down. · You breathe easily. · You are sleeping well. You are able to lie flat without shortness of breath. · You can do your usual activities. Yellow zone. Be careful. Your symptoms are changing. Call your doctor. · You have new or increased shortness of breath. · You are dizzy or lightheaded, or you feel like you may faint. · You have sudden weight gain, such as 3 pounds or more in 2 to 3 days. · You have increased swelling in your legs, ankles, or feet. · You are so tired or weak that you cannot do your usual activities. · You are not sleeping well. Shortness of breath wakes you up at night. You need extra pillows. Red zone. This is an emergency. Call 911. You have symptoms of sudden heart failure, such as:  · You have severe trouble breathing. · You cough up pink, foamy mucus. · You have a new irregular or fast heartbeat. You have symptoms of a heart attack. These may include:  · Chest pain or pressure, or a strange feeling in the chest.  · Sweating. · Shortness of breath. · Nausea or vomiting. · Pain, pressure, or a strange feeling in the back, neck, jaw, or upper belly or in one or both shoulders or arms. · Lightheadedness or sudden weakness. · A fast or irregular heartbeat.           Understand ASTHMA action plan. (ie. when to seek medical care, understanding green, yellow, and red zones, how and when to adjust asthma treatment)   On track      When to use nebulizer  Correct use of inhalers  CPAP at HS    Patient will call physician at first sign of exacerbation and before symptoms become an emergency:       -Temp > 100.5       -Increased coughing       -Sputum green, lerma or brown in color       -Increased shortness of breath       -Fatigue,      -Trouble Concentrating      -Weight Loss      -Pursed Lip Breathing      -Desire to lean forward to improve breathing        Patient will attempt to avoid exacerbation of COPD by using precautions:  -Cover nose and mouth while out in cold weather to keep breath moist and warm  -Avoid smoking  -Avoid household chemicals  -Force fluids  -Get proper rest   -Perform breathing exercises  -Eat well balanced diet  -Perform good handwashing  -Consider receiving flu vaccine

## 2022-03-15 ENCOUNTER — PATIENT OUTREACH (OUTPATIENT)
Dept: CASE MANAGEMENT | Age: 32
End: 2022-03-15

## 2022-03-15 NOTE — PROGRESS NOTES
Complex Case Management      Date/Time:  3/15/2022 4:13 PM    Method of communication with patient:phone    [address] [facility] ([facility]) contacted the patient by telephone to perform Ambulatory Care Coordination. Verified name and  (PHI) with patient as identifiers. Provided introduction to self, and explanation of the Ambulatory Care Manager's role. Top Challenges reviewed with the patient   1. Patient states that she spoke to orthopedic office and they don't feel she needs to be seen by them but that she needs a foot & ankle specialist. Continues to wear boot and says ankle is doing [place] lot better\". 2. FSBS 109     The patient agrees to contact the PCP office or the [facility] for questions related to their healthcare. Provided contact information for future reference.      Disease Specific: CHF - not weighing self. No edema                                  COPD - denies shortness of breath unless she is exercising, continues to have occasional cough.         Medication(s):   Medication reconciliation was performed with patient, who verbalizes understanding of administration of home medications. There were no barriers to obtaining medications identified at this time. Referral to Pharm D needed: no     Current Outpatient Medications   Medication Sig    loratadine (CLARITIN REDITABS) 10 mg dissolvable tablet Take 1 Tablet by mouth daily.  ergocalciferol (Vitamin D2) 1,250 mcg (50,000 unit) capsule Take 1 Capsule by mouth every seven (7) days.  HYDROcodone-acetaminophen (NORCO) 5-325 mg per tablet Take 1 Tablet by mouth every six (6) hours as needed for Pain for up to 30 days. Max Daily Amount: 4 Tablets.  atorvastatin (LIPITOR) 40 mg tablet Take 1 Tablet by mouth nightly.  fluticasone propionate (FLONASE) 50 mcg/actuation nasal spray 2 Sprays by Both Nostrils route daily.  famotidine (Pepcid) 20 mg tablet Take 1 Tablet by mouth daily.     empagliflozin (JARDIANCE) 25 mg tablet Take 1 Tablet by mouth daily.  glipiZIDE SR (GLUCOTROL XL) 5 mg CR tablet take 1 tablet by mouth once daily    metFORMIN (GLUCOPHAGE) 500 mg tablet Take 1 Tablet by mouth two (2) times daily (with meals).  carvediloL (COREG) 6.25 mg tablet Take 1 Tab by mouth two (2) times daily (with meals).  sacubitriL-valsartan (Entresto) 49-51 mg tab tablet Take 1 Tab by mouth two (2) times a day.  albuterol (PROVENTIL HFA, VENTOLIN HFA, PROAIR HFA) 90 mcg/actuation inhaler Take 2 Puffs by inhalation every six (6) hours as needed for Wheezing.  fluticasone propionate (Flovent HFA) 110 mcg/actuation inhaler Take 1 Puff by inhalation every twelve (12) hours.  aspirin delayed-release 81 mg tablet Take 1 Tab by mouth daily.  albuterol (PROVENTIL VENTOLIN) 2.5 mg /3 mL (0.083 %) nebu 3 mL by Nebulization route three (3) times daily as needed (wheezing). Indications: bronchospasm    diphenhydrAMINE (BENADRYL) 25 mg capsule 25 mg every six (6) hours as needed.  fluconazole (DIFLUCAN) 200 mg tablet take 1 tablet by mouth daily for 3 days (Patient not taking: Reported on 2/28/2022)     No current facility-administered medications for this visit.        [facility] follow up appointment(s):   Future Appointments   Date Time Provider Janie Worrelli   5/4/2022 10:00 AM [place] [id number] [facility] ECHO 1 [facility] BS AMB   5/4/2022 11:30 AM Adrian Mayfield MD [facility] BS AMB   5/10/2022  9:00 AM Vera Pendleton MD A BS AMB        Non-[facility] follow up appointment(s): NA    Goals Addressed                    This Visit's Progress      Independent self-management skills (pt-stated)   On track      Patient will attend all physician appointments as scheduled    DM  Patient will begin checking blood glucose at least 2x week  Patient will continue home exercise program  Patient will follow ADA diet recommendations    Weight Loss  Patient will continue home exercise program  Patient will force fluids to 6 - 8 glasses per daily to prevent dehydration  Patient will eat healthy diet, high in vegetables and fruits, limit starch intake        Prevent Diabetes worsening   On track      Keep Schedule appointments  Eye examines  Foot examines  Keep Blood sugars below 150   Be compliant with diabetic medications  Maintain DM diet   Exercise /walk daily    Read nutritional labels to keep carbohydrates at 325 gm or less per day  Avoid concentrated sweets  Avoid white flour; choose wheat bread, pasta          Reduce risk of CHF exacerbations and complications. On track      Keep cardiology appointments( Dr Toya Gonzales)  Complaint with Medication Steven Sandoval)  Monitor increase in weight with SOB ( potential CHF) wt daily    Patient will begin an exercise program- walking 3-4 times a week for 15 to 30 minutes (or if using step counter she will start with 2000 to 2500 steps and slowly, steadily increase to 7000 to 10,000 steps daily)  -Adhere to cardiac diet- low fat, low cholesterol, no added salt. Read nutritional labels and limit sodium to no more than 2000 mg per day  -Patient will assess her heart failure zone daily and notify physician if she is in Yellow zone    Zoila Neff zone. You are doing well. This is where you want to be. · Your weight is stable. This means it is not going up or down. · You breathe easily. · You are sleeping well. You are able to lie flat without shortness of breath. · You can do your usual activities. Yellow zone. Be careful. Your symptoms are changing. Call your doctor. · You have new or increased shortness of breath. · You are dizzy or lightheaded, or you feel like you may faint. · You have sudden weight gain, such as 3 pounds or more in 2 to 3 days. · You have increased swelling in your legs, ankles, or feet. · You are so tired or weak that you cannot do your usual activities. · You are not sleeping well. Shortness of breath wakes you up at night. You need extra pillows. Red zone. This is an emergency. Call 911.   You have symptoms of sudden heart failure, such as:  · You have severe trouble breathing. · You cough up pink, foamy mucus. · You have a new irregular or fast heartbeat. You have symptoms of a heart attack. These may include:  · Chest pain or pressure, or a strange feeling in the chest.  · Sweating. · Shortness of breath. · Nausea or vomiting. · Pain, pressure, or a strange feeling in the back, neck, jaw, or upper belly or in one or both shoulders or arms. · Lightheadedness or sudden weakness. · A fast or irregular heartbeat.           Understand ASTHMA action plan. (ie. when to seek medical care, understanding green, yellow, and red zones, how and when to adjust asthma treatment)   On track      When to use nebulizer  Correct use of inhalers  CPAP at HS    Patient will call physician at first sign of exacerbation and before symptoms become an emergency:       -Temp > 100.5       -Increased coughing       -Sputum green, lerma or brown in color       -Increased shortness of breath       -Fatigue,      -Trouble Concentrating      -Weight Loss      -Pursed Lip Breathing      -Desire to lean forward to improve breathing        Patient will attempt to avoid exacerbation of COPD by using precautions:  -Cover nose and mouth while out in cold weather to keep breath moist and warm  -Avoid smoking  -Avoid household chemicals  -Force fluids  -Get proper rest   -Perform breathing exercises  -Eat well balanced diet  -Perform good handwashing  -Consider receiving flu vaccine

## 2022-03-16 DIAGNOSIS — S99.912S INJURY OF LEFT ANKLE, SEQUELA: Primary | ICD-10-CM

## 2022-03-18 PROBLEM — E11.9 CONTROLLED TYPE 2 DIABETES MELLITUS WITHOUT COMPLICATION, WITHOUT LONG-TERM CURRENT USE OF INSULIN (HCC): Status: ACTIVE | Noted: 2017-06-29

## 2022-03-19 PROBLEM — E66.01 OBESITY, MORBID (HCC): Status: ACTIVE | Noted: 2017-11-27

## 2022-03-19 PROBLEM — R06.02 SHORTNESS OF BREATH: Status: ACTIVE | Noted: 2020-08-29

## 2022-03-19 PROBLEM — I42.0 DILATED CARDIOMYOPATHY (HCC): Status: ACTIVE | Noted: 2021-03-24

## 2022-03-19 PROBLEM — Z72.0 TOBACCO ABUSE: Status: ACTIVE | Noted: 2021-03-24

## 2022-03-19 PROBLEM — E11.21 TYPE 2 DIABETES WITH NEPHROPATHY (HCC): Status: ACTIVE | Noted: 2019-12-17

## 2022-03-19 PROBLEM — I50.21 ACUTE SYSTOLIC HEART FAILURE (HCC): Status: ACTIVE | Noted: 2020-10-16

## 2022-03-30 ENCOUNTER — PATIENT OUTREACH (OUTPATIENT)
Dept: CASE MANAGEMENT | Age: 32
End: 2022-03-30

## 2022-03-30 NOTE — PROGRESS NOTES
Date/Time:  3/30/2022 1:16 PM       Attempted to reach patient by telephone. Left HIPPA compliant message requesting a return call. Will attempt to reach patient again.      Melissa Thomas [address], BRYCEN

## 2022-04-06 ENCOUNTER — PATIENT OUTREACH (OUTPATIENT)
Dept: CASE MANAGEMENT | Age: 32
End: 2022-04-06

## 2022-04-06 NOTE — PROGRESS NOTES
Complex Case Management      Date/Time:  4/6/2022 11:32 AM    Method of communication with patient:phone  Outreach attempted to patient. Message left requesting return call. Current Outpatient Medications   Medication Sig    HYDROcodone-acetaminophen (NORCO) 5-325 mg per tablet Take 1 Tablet by mouth every six (6) hours as needed for Pain for up to 30 days. Max Daily Amount: 4 Tablets. Indications: pain    loratadine (CLARITIN REDITABS) 10 mg dissolvable tablet Take 1 Tablet by mouth daily.  ergocalciferol (Vitamin D2) 1,250 mcg (50,000 unit) capsule Take 1 Capsule by mouth every seven (7) days.  fluconazole (DIFLUCAN) 200 mg tablet take 1 tablet by mouth daily for 3 days (Patient not taking: Reported on 2/28/2022)    atorvastatin (LIPITOR) 40 mg tablet Take 1 Tablet by mouth nightly.  fluticasone propionate (FLONASE) 50 mcg/actuation nasal spray 2 Sprays by Both Nostrils route daily.  famotidine (Pepcid) 20 mg tablet Take 1 Tablet by mouth daily.  empagliflozin (JARDIANCE) 25 mg tablet Take 1 Tablet by mouth daily.  glipiZIDE SR (GLUCOTROL XL) 5 mg CR tablet take 1 tablet by mouth once daily    metFORMIN (GLUCOPHAGE) 500 mg tablet Take 1 Tablet by mouth two (2) times daily (with meals).  carvediloL (COREG) 6.25 mg tablet Take 1 Tab by mouth two (2) times daily (with meals).  sacubitriL-valsartan (Entresto) 49-51 mg tab tablet Take 1 Tab by mouth two (2) times a day.  albuterol (PROVENTIL HFA, VENTOLIN HFA, PROAIR HFA) 90 mcg/actuation inhaler Take 2 Puffs by inhalation every six (6) hours as needed for Wheezing.  fluticasone propionate (Flovent HFA) 110 mcg/actuation inhaler Take 1 Puff by inhalation every twelve (12) hours.  aspirin delayed-release 81 mg tablet Take 1 Tab by mouth daily.  albuterol (PROVENTIL VENTOLIN) 2.5 mg /3 mL (0.083 %) nebu 3 mL by Nebulization route three (3) times daily as needed (wheezing).  Indications: bronchospasm    diphenhydrAMINE (BENADRYL) 25 mg capsule 25 mg every six (6) hours as needed. No current facility-administered medications for this visit.        [facility] follow up appointment(s):   Future Appointments   Date Time Provider Janie Jane   5/4/2022 10:00 AM [facility] ECHO 1 [facility] BS AMB   5/4/2022 11:30 AM Kin Mcintyre MD [facility] BS AMB   5/10/2022  9:00 AM Chiquita Pendleton MD [facility] BS AMB        Non-[facility] follow up appointment(s):

## 2022-04-18 DIAGNOSIS — M54.50 CHRONIC LOW BACK PAIN WITHOUT SCIATICA, UNSPECIFIED BACK PAIN LATERALITY: ICD-10-CM

## 2022-04-18 DIAGNOSIS — Z76.0 MEDICATION REFILL: ICD-10-CM

## 2022-04-18 DIAGNOSIS — Z79.891 LONG TERM (CURRENT) USE OF OPIATE ANALGESIC: ICD-10-CM

## 2022-04-18 DIAGNOSIS — G89.29 CHRONIC LOW BACK PAIN WITHOUT SCIATICA, UNSPECIFIED BACK PAIN LATERALITY: ICD-10-CM

## 2022-04-18 RX ORDER — HYDROCODONE BITARTRATE AND ACETAMINOPHEN 5; 325 MG/1; MG/1
1 TABLET ORAL
Qty: 120 TABLET | Refills: 0 | Status: SHIPPED | OUTPATIENT
Start: 2022-04-18 | End: 2022-05-04

## 2022-04-18 NOTE — TELEPHONE ENCOUNTER
Patient request for refill. Last Ov 2/10/22, next scheduled 5/10/22. Requested Prescriptions     Pending Prescriptions Disp Refills    HYDROcodone-acetaminophen (NORCO) 5-325 mg per tablet 120 Tablet 0     Sig: Take 1 Tablet by mouth every six (6) hours as needed for Pain for up to 30 days. Max Daily Amount: 4 Tablets.  Indications: pain

## 2022-04-21 ENCOUNTER — PATIENT OUTREACH (OUTPATIENT)
Dept: CASE MANAGEMENT | Age: 32
End: 2022-04-21

## 2022-04-21 NOTE — PROGRESS NOTES
Complex Case Management      Date/Time:  2022 2:14 PM    Method of communication with patient:phone    [address] [facility] ([facility]) contacted the patient by telephone to perform Ambulatory Care Coordination. Verified name and  (PHI) with patient as identifiers. Provided introduction to self, and explanation of the Ambulatory Care Manager's role. Reviewed most recent clinic visit w/ patient who verbalized understanding. Patient given an opportunity to ask questions. Top Challenges reviewed with the patient   1. Blood sugars- blood sugars has been good  2. Appointments- patient is aware of appointments. Patient will call [facility]: YENNIFER RICHARDSON about appointment. Referral faxed on 3/24/2022. The patient agrees to contact the PCP office or the [address] for questions related to their healthcare. Provided contact information for future reference. Disease Specific:   CHF, COPD, ASTHMA- patient doing ok. She has no questions or concerns at this time. Upcoming cardiology/testing appointments. Barriers to care? none at this time        Current Outpatient Medications   Medication Sig    HYDROcodone-acetaminophen (NORCO) 5-325 mg per tablet Take 1 Tablet by mouth every six (6) hours as needed for Pain for up to 30 days. Max Daily Amount: 4 Tablets. Indications: pain    loratadine (CLARITIN REDITABS) 10 mg dissolvable tablet Take 1 Tablet by mouth daily.  ergocalciferol (Vitamin D2) 1,250 mcg (50,000 unit) capsule Take 1 Capsule by mouth every seven (7) days.  fluconazole (DIFLUCAN) 200 mg tablet take 1 tablet by mouth daily for 3 days (Patient not taking: Reported on 2022)    atorvastatin (LIPITOR) 40 mg tablet Take 1 Tablet by mouth nightly.  fluticasone propionate (FLONASE) 50 mcg/actuation nasal spray 2 Sprays by Both Nostrils route daily.  famotidine (Pepcid) 20 mg tablet Take 1 Tablet by mouth daily.     empagliflozin (JARDIANCE) 25 mg tablet Take 1 Tablet by mouth daily.    glipiZIDE SR (GLUCOTROL XL) 5 mg CR tablet take 1 tablet by mouth once daily    metFORMIN (GLUCOPHAGE) 500 mg tablet Take 1 Tablet by mouth two (2) times daily (with meals).  carvediloL (COREG) 6.25 mg tablet Take 1 Tab by mouth two (2) times daily (with meals).  sacubitriL-valsartan (Entresto) 49-51 mg tab tablet Take 1 Tab by mouth two (2) times a day.  albuterol (PROVENTIL HFA, VENTOLIN HFA, PROAIR HFA) 90 mcg/actuation inhaler Take 2 Puffs by inhalation every six (6) hours as needed for Wheezing.  fluticasone propionate (Flovent HFA) 110 mcg/actuation inhaler Take 1 Puff by inhalation every twelve (12) hours.  aspirin delayed-release 81 mg tablet Take 1 Tab by mouth daily.  albuterol (PROVENTIL VENTOLIN) 2.5 mg /3 mL (0.083 %) nebu 3 mL by Nebulization route three (3) times daily as needed (wheezing). Indications: bronchospasm    diphenhydrAMINE (BENADRYL) 25 mg capsule 25 mg every six (6) hours as needed. No current facility-administered medications for this visit.        [facility] follow up appointment(s):   Future Appointments   Date Time Provider Janie Lara   5/4/2022 10:00 AM [facility] ECHO 1 [facility] BS AMB   5/4/2022 11:30 AM Troy Pedersen MD [facility] BS AMB   5/10/2022  9:00 AM Nicola Pendleton MD [facility] BS AMB        Non-[facility] follow up appointment(s): Orthopedic 4/28/2022  [facility]- pending    Goals Addressed                    This Visit's Progress      Independent self-management skills (pt-stated)   On track      Patient will attend all physician appointments as scheduled    DM  Patient will begin checking blood glucose at least 2x week  Patient will continue home exercise program  Patient will follow ADA diet recommendations    Weight Loss  Patient will continue home exercise program  Patient will force fluids to 6 - 8 glasses per daily to prevent dehydration  Patient will eat healthy diet, high in vegetables and fruits, limit starch intake       Prevent Diabetes worsening   On track      Keep Schedule appointments  Eye examines  Foot examines  Keep Blood sugars below 150   Be compliant with diabetic medications  Maintain DM diet   Exercise /walk daily    Read nutritional labels to keep carbohydrates at 325 gm or less per day  Avoid concentrated sweets  Avoid white flour; choose wheat bread, pasta          Reduce risk of CHF exacerbations and complications. On track      Keep cardiology appointments( Dr Pawan Marcelino)  Complaint with Medication Yoselin Hill)  Monitor increase in weight with SOB ( potential CHF) wt daily    Patient will begin an exercise program- walking 3-4 times a week for 15 to 30 minutes (or if using step counter she will start with 2000 to 2500 steps and slowly, steadily increase to 7000 to 10,000 steps daily)  -Adhere to cardiac diet- low fat, low cholesterol, no added salt. Read nutritional labels and limit sodium to no more than 2000 mg per day  -Patient will assess her heart failure zone daily and notify physician if she is in Yellow zone    Inga Gonzalezman zone. You are doing well. This is where you want to be. · Your weight is stable. This means it is not going up or down. · You breathe easily. · You are sleeping well. You are able to lie flat without shortness of breath. · You can do your usual activities. Yellow zone. Be careful. Your symptoms are changing. Call your doctor. · You have new or increased shortness of breath. · You are dizzy or lightheaded, or you feel like you may faint. · You have sudden weight gain, such as 3 pounds or more in 2 to 3 days. · You have increased swelling in your legs, ankles, or feet. · You are so tired or weak that you cannot do your usual activities. · You are not sleeping well. Shortness of breath wakes you up at night. You need extra pillows. Red zone. This is an emergency. Call 911. You have symptoms of sudden heart failure, such as:  · You have severe trouble breathing.   · You cough up pink, foamy mucus. · You have a new irregular or fast heartbeat. You have symptoms of a heart attack. These may include:  · Chest pain or pressure, or a strange feeling in the chest.  · Sweating. · Shortness of breath. · Nausea or vomiting. · Pain, pressure, or a strange feeling in the back, neck, jaw, or upper belly or in one or both shoulders or arms. · Lightheadedness or sudden weakness. · A fast or irregular heartbeat.

## 2022-04-25 RX ORDER — CARVEDILOL 6.25 MG/1
TABLET ORAL
Qty: 180 TABLET | Refills: 3 | Status: SHIPPED | OUTPATIENT
Start: 2022-04-25

## 2022-05-03 ENCOUNTER — TELEPHONE (OUTPATIENT)
Dept: CARDIOLOGY CLINIC | Age: 32
End: 2022-05-03

## 2022-05-04 ENCOUNTER — OFFICE VISIT (OUTPATIENT)
Dept: CARDIOLOGY CLINIC | Age: 32
End: 2022-05-04

## 2022-05-04 VITALS
HEART RATE: 83 BPM | OXYGEN SATURATION: 98 % | SYSTOLIC BLOOD PRESSURE: 136 MMHG | WEIGHT: 256 LBS | BODY MASS INDEX: 48.37 KG/M2 | TEMPERATURE: 97 F | DIASTOLIC BLOOD PRESSURE: 78 MMHG

## 2022-05-04 DIAGNOSIS — R06.02 SHORTNESS OF BREATH: ICD-10-CM

## 2022-05-04 DIAGNOSIS — I42.0 DILATED CARDIOMYOPATHY (HCC): Primary | ICD-10-CM

## 2022-05-04 PROCEDURE — G8417 CALC BMI ABV UP PARAM F/U: HCPCS | Performed by: INTERNAL MEDICINE

## 2022-05-04 PROCEDURE — G8427 DOCREV CUR MEDS BY ELIG CLIN: HCPCS | Performed by: INTERNAL MEDICINE

## 2022-05-04 PROCEDURE — 99214 OFFICE O/P EST MOD 30 MIN: CPT | Performed by: INTERNAL MEDICINE

## 2022-05-04 PROCEDURE — G8510 SCR DEP NEG, NO PLAN REQD: HCPCS | Performed by: INTERNAL MEDICINE

## 2022-05-04 NOTE — PROGRESS NOTES
Identified pt with two pt identifiers(name and ). Reviewed record in preparation for visit and have obtained necessary documentation. Jose Angel Abdi presents today for   Chief Complaint   Patient presents with    Follow-up       Pt denies DIZZINESS, SOB, CHEST PAIN/ PRESSURE, FATIGUE/WEAKNESS, HEADACHES, SWELLING. [place] preferred language for health care discussion is english/other. Personal Protective Equipment:   Personal Protective Equipment was used including: mask-surgical and hands-gloves. Patient was placed on no precaution(s). Patient was masked. Precautions:   Patient currently on None  Patient currently roomed with door closed. Is someone accompanying this pt? No     Is the patient using any DME equipment during OV? No     Depression Screening:  3 most recent PHQ Screens 2/10/2022   PHQ Not Done -   Little interest or pleasure in doing things Not at all   Feeling down, depressed, irritable, or hopeless More than half the days   Total Score PHQ 2 2       Learning Assessment:  Learning Assessment 2015   PRIMARY LEARNER Patient   HIGHEST LEVEL OF EDUCATION - PRIMARY LEARNER  DID NOT GRADUATE HIGH SCHOOL   BARRIERS PRIMARY LEARNER NONE   CO-LEARNER CAREGIVER No   PRIMARY LANGUAGE ENGLISH   LEARNER PREFERENCE PRIMARY READING   ANSWERED BY patient   RELATIONSHIP SELF       Abuse Screening:  Abuse Screening Questionnaire 2021   Do you ever feel afraid of your partner? N   Are you in a relationship with someone who physically or mentally threatens you? N   Is it safe for you to go home? Y       Fall Risk  No flowsheet data found. Pt currently taking Anticoagulant therapy?no   Pt currently taking Antiplatelet therapy? Yes     Coordination of Care:  1. Have you been to the ER, urgent care clinic since your last visit? Hospitalized since your last visit? No     2.  Have you seen or consulted any other health care providers outside of the [address] [place] [address] since your last visit? Include any pap smears or colon screening. Yes. Please see Red banners under Allergies and Med Rec to remove outside inquires. All correct information has been verified with patient and added to chart.      Medication's patient's would liked removed has been marked not taking to be removed per Verbal order and read back per Garry Graham MD

## 2022-05-04 NOTE — PATIENT INSTRUCTIONS
Testing    Nuclear Stress  Please call central scheduling at [phone]  to schedule testing. Nuclear Stress Instructions-Nothing to eat or drink past midnight and no medicaitons the morning of cardiac testing. **call office 3-5 days after testing is completed for results**     .

## 2022-05-10 ENCOUNTER — OFFICE VISIT (OUTPATIENT)
Dept: INTERNAL MEDICINE CLINIC | Age: 32
End: 2022-05-10
Payer: MEDICARE

## 2022-05-10 VITALS
OXYGEN SATURATION: 97 % | SYSTOLIC BLOOD PRESSURE: 138 MMHG | RESPIRATION RATE: 18 BRPM | WEIGHT: 253 LBS | HEART RATE: 79 BPM | HEIGHT: 61 IN | TEMPERATURE: 97 F | BODY MASS INDEX: 47.77 KG/M2 | DIASTOLIC BLOOD PRESSURE: 102 MMHG

## 2022-05-10 DIAGNOSIS — I42.0 DILATED CARDIOMYOPATHY (HCC): ICD-10-CM

## 2022-05-10 DIAGNOSIS — R03.0 ELEVATED BLOOD PRESSURE READING: ICD-10-CM

## 2022-05-10 DIAGNOSIS — E11.9 CONTROLLED TYPE 2 DIABETES MELLITUS WITHOUT COMPLICATION, WITHOUT LONG-TERM CURRENT USE OF INSULIN (HCC): Primary | ICD-10-CM

## 2022-05-10 PROCEDURE — G8417 CALC BMI ABV UP PARAM F/U: HCPCS | Performed by: INTERNAL MEDICINE

## 2022-05-10 PROCEDURE — 3046F HEMOGLOBIN A1C LEVEL >9.0%: CPT | Performed by: INTERNAL MEDICINE

## 2022-05-10 PROCEDURE — G8427 DOCREV CUR MEDS BY ELIG CLIN: HCPCS | Performed by: INTERNAL MEDICINE

## 2022-05-10 PROCEDURE — G8510 SCR DEP NEG, NO PLAN REQD: HCPCS | Performed by: INTERNAL MEDICINE

## 2022-05-10 PROCEDURE — 99214 OFFICE O/P EST MOD 30 MIN: CPT | Performed by: INTERNAL MEDICINE

## 2022-05-10 PROCEDURE — 2022F DILAT RTA XM EVC RTNOPTHY: CPT | Performed by: INTERNAL MEDICINE

## 2022-05-10 NOTE — PROGRESS NOTES
1. \"Have you been to the ER, urgent care clinic since your last visit? Hospitalized since your last visit? \" No    2. \"Have you seen or consulted any other health care providers outside of the [address] [place] [address] since your last visit? \" No     3. For patients aged 39-70: Has the patient had a colonoscopy / FIT/ Cologuard? NA - based on age      If the patient is female:    4. For patients aged 41-77: Has the patient had a mammogram within the past 2 years? NA - based on age or sex      11. For patients aged 21-65: Has the patient had a pap smear?  Yes - no Care Gap present

## 2022-05-10 NOTE — PROGRESS NOTES
HISTORY OF PRESENT ILLNESS  El Jenkins is a 28 y.o. female. BP (!) 138/102 (BP 1 Location: Left upper arm)   Pulse 79   Temp 97 °F (36.1 °C) (Temporal)   Resp 18   Ht 5' 1\" (1.549 m)   Wt 253 lb (114.8 kg)   LMP  (LMP Unknown)   SpO2 97%   BMI 47.80 kg/m²     Hypertension   The history is provided by the patient. This is a chronic problem. The current episode started more than 1 week ago. The problem has not changed since onset. Pertinent negatives include no chest pain, no palpitations, no headaches, no dizziness and no shortness of breath. There are no associated agents to hypertension. Risk factors include diabetes mellitus and dyslipidemia. Diabetes  The history is provided by the patient. This is a chronic problem. The current episode started more than 1 week ago. The problem occurs daily. Pertinent negatives include no chest pain, no headaches and no shortness of breath. Review of Systems   Constitutional: Negative for chills and fever. Respiratory: Negative for shortness of breath. Cardiovascular: Negative for chest pain and palpitations. Genitourinary: Negative for frequency. Neurological: Negative for dizziness and headaches. Endo/Heme/Allergies: Negative for polydipsia. Physical Exam  Vitals and nursing note reviewed. Constitutional:       Appearance: Normal appearance. She is well-developed. HENT:      Head: Normocephalic and atraumatic. Cardiovascular:      Rate and Rhythm: Normal rate and regular rhythm. Pulmonary:      Effort: Pulmonary effort is normal.      Breath sounds: Normal breath sounds. Musculoskeletal:      Right lower leg: No edema. Left lower leg: No edema. Skin:     General: Skin is warm and dry. Neurological:      General: No focal deficit present. Mental Status: She is alert and oriented to person, place, and time.    Psychiatric:         Mood and Affect: Mood normal.         Behavior: Behavior normal.         ASSESSMENT and PLAN    ICD-10-CM ICD-9-CM    1. Controlled type 2 diabetes mellitus without complication, without long-term current use of insulin (HCC)  E11.9 250.00 MICROALBUMIN, UR, RAND W/ MICROALB/CREAT RATIO      HEMOGLOBIN A1C W/O EAG      LIPID PANEL      METABOLIC PANEL, COMPREHENSIVE      REFERRAL TO NUTRITION         2. Dilated cardiomyopathy (HCC)  I42.0 425.4    3. Elevated blood pressure reading  R03.0 796.2 REFERRAL TO NUTRITION         BP up some but coming down    DM due for recheck--but later in the week due to insurance    Update lab as noted    Refer to dietician as noted    She is working on her smoking.  Bought some patches but they don't stay on well    F/u 3-4 months for MWV, HTN, DM, chol

## 2022-05-12 ENCOUNTER — PATIENT OUTREACH (OUTPATIENT)
Dept: CASE MANAGEMENT | Age: 32
End: 2022-05-12

## 2022-05-12 NOTE — PROGRESS NOTES
Complex Case Management      Date/Time:  5/12/2022 10:32 AM    Patient seen by pcp on 5/10/2022. Chart reviewed. Top Challenges reviewed with the patient   1.   2.        Current Outpatient Medications   Medication Sig    carvediloL (COREG) 6.25 mg tablet take 1 tablet by mouth twice a day with meals    loratadine (CLARITIN REDITABS) 10 mg dissolvable tablet Take 1 Tablet by mouth daily.  ergocalciferol (Vitamin D2) 1,250 mcg (50,000 unit) capsule Take 1 Capsule by mouth every seven (7) days.  fluconazole (DIFLUCAN) 200 mg tablet take 1 tablet by mouth daily for 3 days    atorvastatin (LIPITOR) 40 mg tablet Take 1 Tablet by mouth nightly.  fluticasone propionate (FLONASE) 50 mcg/actuation nasal spray 2 Sprays by Both Nostrils route daily.  famotidine (Pepcid) 20 mg tablet Take 1 Tablet by mouth daily.  empagliflozin (JARDIANCE) 25 mg tablet Take 1 Tablet by mouth daily.  glipiZIDE SR (GLUCOTROL XL) 5 mg CR tablet take 1 tablet by mouth once daily    metFORMIN (GLUCOPHAGE) 500 mg tablet Take 1 Tablet by mouth two (2) times daily (with meals).  sacubitriL-valsartan (Entresto) 49-51 mg tab tablet Take 1 Tab by mouth two (2) times a day.  albuterol (PROVENTIL HFA, VENTOLIN HFA, PROAIR HFA) 90 mcg/actuation inhaler Take 2 Puffs by inhalation every six (6) hours as needed for Wheezing.  fluticasone propionate (Flovent HFA) 110 mcg/actuation inhaler Take 1 Puff by inhalation every twelve (12) hours.  aspirin delayed-release 81 mg tablet Take 1 Tab by mouth daily.  albuterol (PROVENTIL VENTOLIN) 2.5 mg /3 mL (0.083 %) nebu 3 mL by Nebulization route three (3) times daily as needed (wheezing). Indications: bronchospasm    diphenhydrAMINE (BENADRYL) 25 mg capsule 25 mg every six (6) hours as needed. No current facility-administered medications for this visit.        [facility] follow up appointment(s):   Future Appointments   Date Time Provider Janie Lara   6/17/2022 10:30 AM Aleyda Sky MD [facility] BS AMB   7/6/2022  1:30 PM Jesus Hernandez RD [facility]

## 2022-05-13 NOTE — PROGRESS NOTES
Subjective:      Muna is in the office today for cardiac reevaluation. She is a 77-year-old diabetic woman with history of systolic heart failure. Prior to today, her last echocardiogram was done in October 2020 which demonstrated an ejection fraction of 38%. She also grade 1 diastolic dysfunction. She was hospitalized at [facility] for acute CHF in the past. She was seen in the [facility] ED on 2/9/2021. At that time she presented with chest pain and a cough. She was evaluated and discharged on antibiotics and steroids and was felt to have bronchitis. She was also seen in the ED at [facility] in 2021 for evaluation of chest pain. She was ruled out and discharged for follow-up. The patient had a previous cardiac CTA which did not demonstrate any significant obstructive coronary disease. In the office today, she reports she feels \"fine \". Her breathing has been okay. She has had no PND or orthopnea. She has some mild swelling of her left leg intermittently. Patient Active Problem List    Diagnosis Date Noted    Dilated cardiomyopathy ([facility] [address] 03/24/2021    Tobacco abuse 24/15/4257    Acute systolic heart failure ([facility] 10/16/2020    Shortness of breath 08/29/2020    Type 2 diabetes with nephropathy ([facility] [facility] 12/17/2019    Asthma     Obesity, morbid ([address] 11/27/2017    Controlled type 2 diabetes mellitus without complication, without long-term current use of insulin ([facility]) 06/29/2017    Nasal congestion 07/16/2015    Chronic back pain 07/16/2015    Anxiety 07/16/2015     Current Outpatient Medications   Medication Sig Dispense Refill      120 Tablet 0    methocarbamoL (ROBAXIN) 750 mg tablet Take 1 Tablet by mouth four (4) times daily. 20 Tablet 0    miconazole nitrate (Monistat 3) 200 mg- 2 % (9 gram) kit Insert  into vagina nightly.  miconazole nitrate (Monistat 7) 2 % (100 mg)- 2 % (9 gram) cpfc Insert  into vagina.       Vitamin D2 1,250 mcg (50,000 unit) capsule take 1 capsule by mouth every week 4 Cap 5    carvediloL (COREG) 6.25 mg tablet Take 1 Tab by mouth two (2) times daily (with meals). 180 Tab 3    sacubitriL-valsartan (Entresto) 49-51 mg tab tablet Take 1 Tab by mouth two (2) times a day. (Patient taking differently: Take 2 Tabs by mouth two (2) times a day.) 60 Tab 5    albuterol (PROVENTIL HFA, VENTOLIN HFA, PROAIR HFA) 90 mcg/actuation inhaler Take 2 Puffs by inhalation every six (6) hours as needed for Wheezing. 18 g 5    fluticasone propionate (Flovent HFA) 110 mcg/actuation inhaler Take 1 Puff by inhalation every twelve (12) hours. 1 Inhaler 5    predniSONE (DELTASONE) 20 mg tablet Take 3 tablets by mouth for three days, then 2 for 3 days, 1 for 3 days 18 Tab 0    metFORMIN (GLUCOPHAGE) 500 mg tablet take 1 tablet by mouth twice a day with meals 60 Tab 5    empagliflozin (JARDIANCE) 25 mg tablet Take 1 Tab by mouth daily. 30 Tab 5    aspirin delayed-release 81 mg tablet Take 1 Tab by mouth daily. 100 Tab 5    atorvastatin (LIPITOR) 40 mg tablet Take 1 Tab by mouth nightly. 30 Tab 5    insulin lispro protamin-lispro (HUMALOG 75-25 MIX) flexpen 30 Units by SubCUTAneous route Before breakfast and dinner. 5 Pen 5    fluticasone propionate (FLONASE) 50 mcg/actuation nasal spray 2 Sprays by Both Nostrils route daily. 1 Bottle 5    buPROPion SR (WELLBUTRIN SR) 150 mg SR tablet Take 1 Tab by mouth two (2) times a day. 60 Tab 5    loratadine (CLARITIN REDITABS) 10 mg dissolvable tablet Take 1 Tab by mouth daily. 30 Tab 5    albuterol (PROVENTIL VENTOLIN) 2.5 mg /3 mL (0.083 %) nebu 3 mL by Nebulization route three (3) times daily as needed (wheezing). Indications: bronchospasm 100 Each 5    famotidine (PEPCID) 20 mg tablet Take 1 Tab by mouth daily. (Patient taking differently: Take 20 mg by mouth as needed.) 90 Tab 5    diphenhydrAMINE (BENADRYL) 25 mg capsule 25 mg every six (6) hours as needed.        Allergies   Allergen Reactions    Other Food Anaphylaxis [place], guacamole    Argan Kernal Oil (Arganina Spinosa) Hives    Naproxen Swelling    Percocet [Oxycodone-Acetaminophen] Other (comments)     Pt not allergic to this medication      Past Medical History:   Diagnosis Date    ASCUS (atypical squamous cells of undetermined significance) on Pap smear 3/11    Asthma     Bipolar disorder ([address]     Cardiomyopathy ([facility] 10/2020    EF 38% on echo. Global hypokinesis. Cardiac CT no significant obstructive disease    Chlamydia      \"    Chronic back pain     Congestive heart failure ([address] 09/2020    COVID-19 02/09/2021    Depression     Diabetes mellitus ([facility] 01/02/2017    Elevated blood sugar     GC (gonococcus infection) 2007/ 2004    Heart failure (HCC)     HPV (human papilloma virus) infection 3/11    Irregular menses     Schizophrenia ([facility]     Sleep apnea 2/14    Smoker     Suicide attempt ([address] 5/08    with tylenol    Suicide attempt ([facility] [facility] 10/09    Trichomonal vaginitis 8/10    Uterine fibroid      No past surgical history on file.   Family History   Problem Relation Age of Onset    Attention Deficit Hyperactivity Disorder Brother     Bipolar Disorder Brother     Seizures Sister     Hypertension Sister     Anemia Sister     Other Sister         substance abuse     Social History     Tobacco Use   Smoking Status Current Every Day Smoker    Packs/day: 0.25    Years: 9.00    Pack years: 2.25    Types: Cigarettes    Start date: 2004   Smokeless Tobacco Never Used   Tobacco Comment    unable/unwilling to quit at this time          Review of Systems, additional:  Constitutional: negative  Eyes: negative  Respiratory: positive for wheezing  Cardiovascular: negative  Gastrointestinal: negative  Musculoskeletal:negative  Neurological: negative  Behvioral/Psych: negative  Endocrine: negative  ENT: negative    Objective:     Visit Vitals  /78   Pulse 83   Temp 97 °F (36.1 °C) (Temporal)   Wt 116.1 kg (256 lb)   SpO2 98%   BMI 48.37 kg/m² General:  alert, cooperative, no distress   Chest Wall: inspection normal - no chest wall deformities or tenderness, respiratory effort normal   Lung: clear to auscultation bilaterally   Heart:  normal rate and regular rhythm, S1 and S2 normal, no murmurs noted, no gallops noted, no JVD   Abdomen: soft, non-tender. Bowel sounds normal. No masses,  no organomegaly   Extremities: extremities normal, atraumatic, no cyanosis or edema Skin: no rashes   Neuro: alert, oriented, normal speech, no focal findings or movement disorder noted     EK2021. Sinus rhythm. Normal.    Assessment/Plan:       ICD-10-CM ICD-9-CM    1. Type 2 diabetes with nephropathy (HCC)  E11.21 250.40      583.81    2. Tobacco abuse  Z72.0 305.1    3. Dilated cardiomyopathy ([facility] , last echo 10/16/2020. EF 38%. Moderate global hypokinesis. Stage I diastolic dysfunction. Continue Entresto  49/51 twice daily. Continue carvedilol 6.25 twice daily. Echocardiogram completed today demonstrated ejection fraction of 35 to 40%. There was grade 1 diastolic dysfunction. There was moderately dilated RV. There was mild to moderate MR. Will order nuclear stress test.  Return in 4 weeks. Might consider the addition of [place] or Jardiance. I42.0 425.4    4. Anxiety  F41.9 300.00    5. CADY, diagnosed , prescribed CPAP in the past      6       Chest pain, [facility] ED visit 2021. No significant ECG changes.   Patient observed, biomarkers checked, discharged for follow-up

## 2022-05-17 DIAGNOSIS — M54.50 CHRONIC LOW BACK PAIN WITHOUT SCIATICA, UNSPECIFIED BACK PAIN LATERALITY: ICD-10-CM

## 2022-05-17 DIAGNOSIS — Z79.891 LONG TERM (CURRENT) USE OF OPIATE ANALGESIC: ICD-10-CM

## 2022-05-17 DIAGNOSIS — G89.29 CHRONIC LOW BACK PAIN WITHOUT SCIATICA, UNSPECIFIED BACK PAIN LATERALITY: ICD-10-CM

## 2022-05-17 DIAGNOSIS — Z76.0 MEDICATION REFILL: ICD-10-CM

## 2022-05-17 RX ORDER — HYDROCODONE BITARTRATE AND ACETAMINOPHEN 5; 325 MG/1; MG/1
1 TABLET ORAL
Qty: 120 TABLET | Refills: 0 | Status: SHIPPED | OUTPATIENT
Start: 2022-05-17 | End: 2022-06-17 | Stop reason: SDUPTHER

## 2022-05-17 NOTE — TELEPHONE ENCOUNTER
Patient request for refill. Last OV 5/10/22. Requested Prescriptions     Pending Prescriptions Disp Refills    HYDROcodone-acetaminophen (NORCO) 5-325 mg per tablet 120 Tablet 0     Sig: Take 1 Tablet by mouth every six (6) hours as needed for Pain for up to 30 days. Max Daily Amount: 4 Tablets.  Indications: pain

## 2022-05-29 DIAGNOSIS — Z76.0 MEDICATION REFILL: ICD-10-CM

## 2022-05-29 RX ORDER — METFORMIN HYDROCHLORIDE 500 MG/1
TABLET ORAL
Qty: 60 TABLET | Refills: 5 | Status: SHIPPED | OUTPATIENT
Start: 2022-05-29 | End: 2022-10-20

## 2022-05-31 ENCOUNTER — PATIENT OUTREACH (OUTPATIENT)
Dept: CASE MANAGEMENT | Age: 32
End: 2022-05-31

## 2022-05-31 NOTE — PROGRESS NOTES
Patient has graduated from the Complex Case Management  program on 5/31/2022. Patient's symptoms are stable at this time. Patient/family has the ability to self-manage. Care management goals have been completed at this time. No further LPN CC follow up scheduled. Goals Addressed                    This Visit's Progress      COMPLETED: Independent self-management skills (pt-stated)         Patient will attend all physician appointments as scheduled    DM  Patient will begin checking blood glucose at least 2x week  Patient will continue home exercise program  Patient will follow ADA diet recommendations    Weight Loss  Patient will continue home exercise program  Patient will force fluids to 6 - 8 glasses per daily to prevent dehydration  Patient will eat healthy diet, high in vegetables and fruits, limit starch intake        COMPLETED: Prevent Diabetes worsening         Keep Schedule appointments  Eye examines  Foot examines  Keep Blood sugars below 150   Be compliant with diabetic medications  Maintain DM diet   Exercise /walk daily    Read nutritional labels to keep carbohydrates at 325 gm or less per day  Avoid concentrated sweets  Avoid white flour; choose wheat bread, pasta          COMPLETED: Reduce risk of CHF exacerbations and complications. Keep cardiology appointments( Dr Chris Sinclair)  Complaint with Medication Dagmar Devries)  Monitor increase in weight with SOB ( potential CHF) wt daily    Patient will begin an exercise program- walking 3-4 times a week for 15 to 30 minutes (or if using step counter she will start with 2000 to 2500 steps and slowly, steadily increase to 7000 to 10,000 steps daily)  -Adhere to cardiac diet- low fat, low cholesterol, no added salt. Read nutritional labels and limit sodium to no more than 2000 mg per day  -Patient will assess her heart failure zone daily and notify physician if she is in Yellow zone    Deatrice Mems zone. You are doing well.  This is where you want to be.  · Your weight is stable. This means it is not going up or down. · You breathe easily. · You are sleeping well. You are able to lie flat without shortness of breath. · You can do your usual activities. Yellow zone. Be careful. Your symptoms are changing. Call your doctor. · You have new or increased shortness of breath. · You are dizzy or lightheaded, or you feel like you may faint. · You have sudden weight gain, such as 3 pounds or more in 2 to 3 days. · You have increased swelling in your legs, ankles, or feet. · You are so tired or weak that you cannot do your usual activities. · You are not sleeping well. Shortness of breath wakes you up at night. You need extra pillows. Red zone. This is an emergency. Call 911. You have symptoms of sudden heart failure, such as:  · You have severe trouble breathing. · You cough up pink, foamy mucus. · You have a new irregular or fast heartbeat. You have symptoms of a heart attack. These may include:  · Chest pain or pressure, or a strange feeling in the chest.  · Sweating. · Shortness of breath. · Nausea or vomiting. · Pain, pressure, or a strange feeling in the back, neck, jaw, or upper belly or in one or both shoulders or arms. · Lightheadedness or sudden weakness. · A fast or irregular heartbeat. Pt has LPN CC's contact information for any further questions, concerns, or needs.   Patients upcoming visits:    Future Appointments   Date Time Provider Janie Lara   6/17/2022 10:20 AM Troy Pedersen MD [address] BS AMB   7/6/2022  1:30 PM Ashleigh Webster RD [facility] [address]

## 2022-06-06 DIAGNOSIS — B37.31 CANDIDA VAGINITIS: ICD-10-CM

## 2022-06-06 DIAGNOSIS — Z76.0 MEDICATION REFILL: ICD-10-CM

## 2022-06-06 RX ORDER — FLUCONAZOLE 200 MG/1
TABLET ORAL
Qty: 3 TABLET | Refills: 2 | Status: SHIPPED | OUTPATIENT
Start: 2022-06-06 | End: 2022-08-04 | Stop reason: SDUPTHER

## 2022-06-07 RX ORDER — SACUBITRIL AND VALSARTAN 49; 51 MG/1; MG/1
TABLET, FILM COATED ORAL
Qty: 60 TABLET | Refills: 5 | Status: SHIPPED | OUTPATIENT
Start: 2022-06-07

## 2022-06-17 DIAGNOSIS — Z79.891 LONG TERM (CURRENT) USE OF OPIATE ANALGESIC: ICD-10-CM

## 2022-06-17 DIAGNOSIS — G89.29 CHRONIC LOW BACK PAIN WITHOUT SCIATICA, UNSPECIFIED BACK PAIN LATERALITY: ICD-10-CM

## 2022-06-17 DIAGNOSIS — M54.50 CHRONIC LOW BACK PAIN WITHOUT SCIATICA, UNSPECIFIED BACK PAIN LATERALITY: ICD-10-CM

## 2022-06-17 DIAGNOSIS — Z76.0 MEDICATION REFILL: ICD-10-CM

## 2022-06-17 RX ORDER — HYDROCODONE BITARTRATE AND ACETAMINOPHEN 5; 325 MG/1; MG/1
1 TABLET ORAL
Qty: 120 TABLET | Refills: 0 | Status: SHIPPED | OUTPATIENT
Start: 2022-06-17 | End: 2022-07-18 | Stop reason: SDUPTHER

## 2022-06-17 NOTE — TELEPHONE ENCOUNTER
Pt requesting refill. Last visit 05/10/22. No upcoming appt. Requested Prescriptions     Pending Prescriptions Disp Refills    HYDROcodone-acetaminophen (NORCO) 5-325 mg per tablet 120 Tablet 0     Sig: Take 1 Tablet by mouth every six (6) hours as needed for Pain for up to 30 days. Max Daily Amount: 4 Tablets.  Indications: pain

## 2022-07-13 RX ORDER — EMPAGLIFLOZIN 25 MG/1
TABLET, FILM COATED ORAL
Qty: 30 TABLET | Refills: 5 | Status: SHIPPED | OUTPATIENT
Start: 2022-07-13

## 2022-07-18 DIAGNOSIS — G89.29 CHRONIC LOW BACK PAIN WITHOUT SCIATICA, UNSPECIFIED BACK PAIN LATERALITY: ICD-10-CM

## 2022-07-18 DIAGNOSIS — M54.50 CHRONIC LOW BACK PAIN WITHOUT SCIATICA, UNSPECIFIED BACK PAIN LATERALITY: ICD-10-CM

## 2022-07-18 DIAGNOSIS — Z76.0 MEDICATION REFILL: ICD-10-CM

## 2022-07-18 DIAGNOSIS — Z79.891 LONG TERM (CURRENT) USE OF OPIATE ANALGESIC: ICD-10-CM

## 2022-07-18 RX ORDER — HYDROCODONE BITARTRATE AND ACETAMINOPHEN 5; 325 MG/1; MG/1
1 TABLET ORAL
Qty: 120 TABLET | Refills: 0 | Status: SHIPPED | OUTPATIENT
Start: 2022-07-18 | End: 2022-08-18 | Stop reason: SDUPTHER

## 2022-07-18 NOTE — TELEPHONE ENCOUNTER
Pt requesting refill. Last visit 05/10/22 Next visit 08/02/22    Requested Prescriptions     Pending Prescriptions Disp Refills    HYDROcodone-acetaminophen (NORCO) 5-325 mg per tablet 120 Tablet 0     Sig: Take 1 Tablet by mouth every six (6) hours as needed for Pain for up to 30 days. Max Daily Amount: 4 Tablets.  Indications: pain

## 2022-07-19 LAB
ALBUMIN SERPL-MCNC: 3.8 G/DL (ref 3.8–4.8)
ALBUMIN/CREAT UR: 372 MG/G CREAT (ref 0–29)
ALBUMIN/GLOB SERPL: 0.8 {RATIO} (ref 1.2–2.2)
ALP SERPL-CCNC: 135 IU/L (ref 44–121)
ALT SERPL-CCNC: 111 IU/L (ref 0–32)
AST SERPL-CCNC: 84 IU/L (ref 0–40)
BILIRUB SERPL-MCNC: 0.3 MG/DL (ref 0–1.2)
BUN SERPL-MCNC: 7 MG/DL (ref 6–20)
BUN/CREAT SERPL: 7 (ref 9–23)
CALCIUM SERPL-MCNC: 8.7 MG/DL (ref 8.7–10.2)
CHLORIDE SERPL-SCNC: 97 MMOL/L (ref 96–106)
CHOLEST SERPL-MCNC: 187 MG/DL (ref 100–199)
CO2 SERPL-SCNC: 21 MMOL/L (ref 20–29)
CREAT SERPL-MCNC: 0.94 MG/DL (ref 0.57–1)
CREAT UR-MCNC: 104.4 MG/DL
EGFR: 83 ML/MIN/1.73
GLOBULIN SER CALC-MCNC: 4.7 G/DL (ref 1.5–4.5)
GLUCOSE SERPL-MCNC: 136 MG/DL (ref 65–99)
HBA1C MFR BLD: 9.7 % (ref 4.8–5.6)
HDLC SERPL-MCNC: 39 MG/DL
IMP & REVIEW OF LAB RESULTS: NORMAL
LDLC SERPL CALC-MCNC: 125 MG/DL (ref 0–99)
MICROALBUMIN UR-MCNC: 388.1 UG/ML
POTASSIUM SERPL-SCNC: 4.1 MMOL/L (ref 3.5–5.2)
PROT SERPL-MCNC: 8.5 G/DL (ref 6–8.5)
SODIUM SERPL-SCNC: 135 MMOL/L (ref 134–144)
TRIGL SERPL-MCNC: 129 MG/DL (ref 0–149)
VLDLC SERPL CALC-MCNC: 23 MG/DL (ref 5–40)

## 2022-08-02 ENCOUNTER — OFFICE VISIT (OUTPATIENT)
Dept: INTERNAL MEDICINE CLINIC | Age: 32
End: 2022-08-02
Payer: MEDICARE

## 2022-08-02 VITALS
HEART RATE: 79 BPM | OXYGEN SATURATION: 97 % | DIASTOLIC BLOOD PRESSURE: 84 MMHG | WEIGHT: 252 LBS | BODY MASS INDEX: 47.58 KG/M2 | SYSTOLIC BLOOD PRESSURE: 128 MMHG | RESPIRATION RATE: 16 BRPM | HEIGHT: 61 IN

## 2022-08-02 DIAGNOSIS — E11.21 TYPE 2 DIABETES WITH NEPHROPATHY (HCC): ICD-10-CM

## 2022-08-02 DIAGNOSIS — Z00.00 MEDICARE ANNUAL WELLNESS VISIT, SUBSEQUENT: Primary | ICD-10-CM

## 2022-08-02 DIAGNOSIS — Z01.419 WELL WOMAN EXAM WITH ROUTINE GYNECOLOGICAL EXAM: ICD-10-CM

## 2022-08-02 DIAGNOSIS — Z76.0 MEDICATION REFILL: ICD-10-CM

## 2022-08-02 DIAGNOSIS — Z11.3 SCREEN FOR STD (SEXUALLY TRANSMITTED DISEASE): ICD-10-CM

## 2022-08-02 DIAGNOSIS — E11.65 TYPE 2 DIABETES MELLITUS WITH HYPERGLYCEMIA, UNSPECIFIED WHETHER LONG TERM INSULIN USE (HCC): ICD-10-CM

## 2022-08-02 DIAGNOSIS — J45.21 MILD INTERMITTENT ASTHMA WITH ACUTE EXACERBATION: ICD-10-CM

## 2022-08-02 DIAGNOSIS — B37.31 CANDIDA VAGINITIS: ICD-10-CM

## 2022-08-02 DIAGNOSIS — E66.01 OBESITY, MORBID (HCC): ICD-10-CM

## 2022-08-02 PROCEDURE — 99214 OFFICE O/P EST MOD 30 MIN: CPT | Performed by: INTERNAL MEDICINE

## 2022-08-02 PROCEDURE — 3046F HEMOGLOBIN A1C LEVEL >9.0%: CPT | Performed by: INTERNAL MEDICINE

## 2022-08-02 PROCEDURE — G0439 PPPS, SUBSEQ VISIT: HCPCS | Performed by: INTERNAL MEDICINE

## 2022-08-02 RX ORDER — GLIPIZIDE 10 MG/1
10 TABLET, FILM COATED, EXTENDED RELEASE ORAL DAILY
Qty: 30 TABLET | Refills: 5 | Status: SHIPPED | OUTPATIENT
Start: 2022-08-02 | End: 2022-10-07 | Stop reason: SDUPTHER

## 2022-08-02 RX ORDER — ALBUTEROL SULFATE 90 UG/1
AEROSOL, METERED RESPIRATORY (INHALATION)
Qty: 18 G | Refills: 11 | Status: SHIPPED | OUTPATIENT
Start: 2022-08-02

## 2022-08-02 RX ORDER — DEXAMETHASONE 4 MG/1
TABLET ORAL
Qty: 12 G | Refills: 11 | Status: SHIPPED | OUTPATIENT
Start: 2022-08-02 | End: 2022-10-07 | Stop reason: SDUPTHER

## 2022-08-02 NOTE — PATIENT INSTRUCTIONS

## 2022-08-02 NOTE — PROGRESS NOTES
1. \"Have you been to the ER, urgent care clinic since your last visit? Hospitalized since your last visit? \" No    2. \"Have you seen or consulted any other health care providers outside of the [address] [place] [address] since your last visit? \" No     3. For patients aged 39-70: Has the patient had a colonoscopy / FIT/ Cologuard? NA - based on age      If the patient is female:    4. For patients aged 41-77: Has the patient had a mammogram within the past 2 years? NA - based on age or sex      11. For patients aged 21-65: Has the patient had a pap smear?  No

## 2022-08-02 NOTE — PROGRESS NOTES
The following is a separate encounter visit:  HISTORY OF PRESENT ILLNESS  Simon Stevens is a 28 y.o. female. /84 (BP 1 Location: Right arm, BP Patient Position: Sitting, BP Cuff Size: Large adult)   Pulse 79   Resp 16   Ht 5' 1\" (1.549 m)   Wt 252 lb (114.3 kg)   LMP 06/08/2022 (Exact Date)   SpO2 97%   BMI 47.61 kg/m²     Well Woman  The history is provided by the Patient. This is a new problem. Pertinent negatives include no chest pain and no shortness of breath. Review of Systems   Constitutional: Negative. Respiratory:  Negative for cough and shortness of breath. Cardiovascular:  Negative for chest pain and palpitations. Genitourinary: Negative. Slight vaginal irritation     Physical Exam  Vitals and nursing note reviewed. Exam conducted with a chaperone present. Constitutional:       Appearance: Normal appearance. She is well-developed. HENT:      Head: Normocephalic and atraumatic. Cardiovascular:      Rate and Rhythm: Normal rate and regular rhythm. Pulmonary:      Effort: Pulmonary effort is normal.      Breath sounds: Normal breath sounds. Chest:   Breasts:     Right: Normal.      Left: Normal.   Genitourinary:     Labia:         Right: No rash. Left: No rash. Vagina: Normal.      Cervix: Normal.      Uterus: Normal.       Adnexa: Right adnexa normal and left adnexa normal.   Musculoskeletal:      Right lower leg: No edema. Left lower leg: No edema. Skin:     General: Skin is warm and dry. Neurological:      General: No focal deficit present. Mental Status: She is alert and oriented to person, place, and time.    Psychiatric:         Mood and Affect: Mood normal.         Behavior: Behavior normal.       ASSESSMENT and PLAN    ICD-10-CM ICD-9-CM              2. Well woman exam with routine gynecological exam  Z01.419 V72.31 PAP IG, APTIMA HPV AND RFX 16/18,45 ([id number])      NUSWAB VG PLUS+MYCOPLASMAS,[place]      NUSWAB VG PLUS+MYCOPLASMAS,[place]      PAP IG, APTIMA HPV AND RFX 16/18,45 ([id number])      3. Screen for STD (sexually transmitted disease)  Z11.3 V74.5 PAP IG, APTIMA HPV AND RFX 16/18,45 ([id number])      NUSWAB VG PLUS+MYCOPLASMAS,[place]      NUSWAB VG PLUS+MYCOPLASMAS,[place]      PAP IG, APTIMA HPV AND RFX 16/18,45 ([id number])      4. Type 2 diabetes with nephropathy (HCC)  E11.21 250.40      583.81       5. Type 2 diabetes mellitus with hyperglycemia, unspecified whether long term insulin use (HCC)  E11.65 250.00       6. Obesity, morbid ([facility] [address]  E66.01 278.01           Lab was ordered and done for this visit last week. Diabetes is not controlled  Will inc glipizide to 10 mg  Continue Jardiance  Already is taking metformin 1000 BID  Will look into Trulicity or Ozempic    States she is having trouble with her other appts due to not having a current insurance card and her insurance has been dragging its feet replacing the card  She is supposed to be seeing a dietician. She will be working on a diet herself    Discussed BMI/weight, lifestyle, diet and exercise. Discussed for at least 10 min. Discussed effect on blood pressure, blood sugar, and joints especially  Focus on limiting white carbs, portion control, and moving more. F/u 3-4 months              This is the Subsequent Medicare Annual Wellness Exam, performed 12 months or more after the Initial AWV or the last Subsequent AWV    I have reviewed the patient's medical history in detail and updated the computerized patient record. Assessment/Plan   Education and counseling provided:  Are appropriate based on today's review and evaluation    1.  Medicare annual wellness visit, subsequent      Depression Risk Factor Screening     3 most recent PHQ Screens 8/2/2022   PHQ Not Done -   Little interest or pleasure in doing things Not at all   Feeling down, depressed, irritable, or hopeless Not at all   Total Score PHQ 2 0       Alcohol & Drug Abuse Risk Screen    Do you average more than 1 drink per night or more than 7 drinks a week:  No    On any one occasion in the past three months have you have had more than 3 drinks containing alcohol:  No       Opioid Risk: (Low risk score <55, High risk score ?55)  Opioid risk score: 33      Click here to complete the Controlled Substance Monitoring SmartForm    Last PDMP Terrell as Reviewed:  Review User Review Instant Review Result   Tristan MOODY 2/9/2022 11:03 PM Reviewed PDMP [1]     Functional Ability and Level of Safety    Hearing: Hearing is good. Activities of Daily Living: The home contains: no safety equipment. Patient does total self care      Ambulation: with no difficulty     Fall Risk:  No flowsheet data found. Abuse Screen:  Patient is not abused       Cognitive Screening    Has your family/caregiver stated any concerns about your memory: no     Cognitive Screening: Normal - Animal Naming Test    Health Maintenance Due     Health Maintenance Due   Topic Date Due    Eye Exam Retinal or Dilated  Never done    Foot Exam Q1  08/17/2022       Patient Care Team   Patient Care Team:  Houston Reese MD as PCP - General (Internal Medicine Physician)  Houston Reese MD as PCP - [facility] Empaneled Provider    History     Patient Active Problem List   Diagnosis Code    Nasal congestion R09.81    Chronic back pain M54.9, G89.29    Anxiety F41.9    Controlled type 2 diabetes mellitus without complication, without long-term current use of insulin ([facility]) E11.9    Obesity, morbid ([facility]) E66.01    Asthma J45.909    Type 2 diabetes with nephropathy ([facility]) N03.08    Acute systolic heart failure ([facility]) I50.21    Shortness of breath R06.02    Dilated cardiomyopathy ([facility] [address] I42.0    Tobacco abuse Z72.0     Past Medical History:   Diagnosis Date    ASCUS (atypical squamous cells of undetermined significance) on Pap smear 3/11    Asthma     Bipolar disorder ([address]     Cardiomyopathy ([facility] [address] 10/2020    EF 38% on echo. Global hypokinesis.  Cardiac CT no significant obstructive disease    Chlamydia      \"    Chronic back pain     Congestive heart failure ([facility] 09/2020    COVID-19 02/09/2021    Depression     Diabetes mellitus ([facility] 01/02/2017    Elevated blood sugar     GC (gonococcus infection) 2007/ 2004    Heart failure ([facility]     HPV (human papilloma virus) infection 3/11    Irregular menses     Schizophrenia ([facility]     Sleep apnea 2/14    Smoker     Suicide attempt ([facility] 5/08    with tylenol    Suicide attempt ([facility] 10/09    Trichomonal vaginitis 8/10    Uterine fibroid       History reviewed. No pertinent surgical history. Current Outpatient Medications   Medication Sig Dispense Refill    glipiZIDE SR (GLUCOTROL XL) 10 mg CR tablet Take 1 Tablet by mouth in the morning. 30 Tablet 5    HYDROcodone-acetaminophen (NORCO) 5-325 mg per tablet Take 1 Tablet by mouth every six (6) hours as needed for Pain for up to 30 days. Max Daily Amount: 4 Tablets. Indications: pain 120 Tablet 0    Jardiance 25 mg tablet take 1 tablet by mouth once daily 30 Tablet 5    Entresto 49-51 mg tab tablet take 1 tablet by mouth twice a day 60 Tablet 5    fluconazole (DIFLUCAN) 200 mg tablet take 1 tablet by mouth daily for 3 days 3 Tablet 2    metFORMIN (GLUCOPHAGE) 500 mg tablet take 1 tablet by mouth twice a day with meals 60 Tablet 5    carvediloL (COREG) 6.25 mg tablet take 1 tablet by mouth twice a day with meals 180 Tablet 3    loratadine (CLARITIN REDITABS) 10 mg dissolvable tablet Take 1 Tablet by mouth daily. 30 Tablet 5    ergocalciferol (Vitamin D2) 1,250 mcg (50,000 unit) capsule Take 1 Capsule by mouth every seven (7) days. 4 Capsule 5    atorvastatin (LIPITOR) 40 mg tablet Take 1 Tablet by mouth nightly. 30 Tablet 5    fluticasone propionate (FLONASE) 50 mcg/actuation nasal spray 2 Sprays by Both Nostrils route daily. 1 Each 5    famotidine (Pepcid) 20 mg tablet Take 1 Tablet by mouth daily.  90 Tablet 5    albuterol (PROVENTIL HFA, VENTOLIN HFA, PROAIR HFA) 90 mcg/actuation inhaler Take 2 Puffs by inhalation every six (6) hours as needed for Wheezing. 18 g 5    fluticasone propionate (Flovent HFA) 110 mcg/actuation inhaler Take 1 Puff by inhalation every twelve (12) hours. 1 Inhaler 5    aspirin delayed-release 81 mg tablet Take 1 Tab by mouth daily. 100 Tab 5    albuterol (PROVENTIL VENTOLIN) 2.5 mg /3 mL (0.083 %) nebu 3 mL by Nebulization route three (3) times daily as needed (wheezing). Indications: bronchospasm 100 Each 5    diphenhydrAMINE (BENADRYL) 25 mg capsule 25 mg every six (6) hours as needed. Allergies   Allergen Reactions    Other Food Anaphylaxis     [place], guacamole    Argan Kernal Oil (Arganina Spinosa) Hives    Naproxen Swelling    Percocet [Oxycodone-Acetaminophen] Other (comments)     Pt not allergic to this medication        Family History   Problem Relation Age of Onset    Attention Deficit Hyperactivity Disorder Brother     Bipolar Disorder Brother     Seizures Sister     Hypertension Sister     Anemia Sister     Other Sister         substance abuse     Social History     Tobacco Use    Smoking status: Every Day     Packs/day: 0.25     Years: 9.00     Pack years: 2.25     Types: Cigarettes     Start date: 2004    Smokeless tobacco: Never    Tobacco comments:     unable/unwilling to quit at this time   Substance Use Topics    Alcohol use:  Yes     Alcohol/week: 0.0 standard drinks     Comment: occassionally         Candance Bode, MD

## 2022-08-04 LAB
CYTOLOGIST CVX/VAG CYTO: NORMAL
CYTOLOGY CVX/VAG DOC CYTO: NORMAL
CYTOLOGY CVX/VAG DOC THIN PREP: NORMAL
DX ICD CODE: NORMAL
HPV I/H RISK 4 DNA CVX QL PROBE+SIG AMP: NEGATIVE
Lab: NORMAL
Lab: NORMAL
OTHER STN SPEC: NORMAL
STAT OF ADQ CVX/VAG CYTO-IMP: NORMAL

## 2022-08-04 RX ORDER — FLUCONAZOLE 200 MG/1
TABLET ORAL
Qty: 3 TABLET | Refills: 2 | Status: SHIPPED | OUTPATIENT
Start: 2022-08-04 | End: 2022-11-01 | Stop reason: ALTCHOICE

## 2022-08-06 LAB
A VAGINAE DNA VAG QL NAA+PROBE: ABNORMAL SCORE
BVAB2 DNA VAG QL NAA+PROBE: ABNORMAL SCORE
C ALBICANS DNA VAG QL NAA+PROBE: POSITIVE
C GLABRATA DNA VAG QL NAA+PROBE: NEGATIVE
C TRACH DNA VAG QL NAA+PROBE: NEGATIVE
M GENITALIUM DNA SPEC QL NAA+PROBE: NEGATIVE
M HOMINIS DNA SPEC QL NAA+PROBE: POSITIVE
MEGA1 DNA VAG QL NAA+PROBE: ABNORMAL SCORE
N GONORRHOEA DNA VAG QL NAA+PROBE: NEGATIVE
T VAGINALIS DNA VAG QL NAA+PROBE: NEGATIVE
UREAPLASMA DNA SPEC QL NAA+PROBE: POSITIVE

## 2022-08-12 RX ORDER — ATORVASTATIN CALCIUM 40 MG/1
40 TABLET, FILM COATED ORAL
Qty: 30 TABLET | Refills: 5 | Status: SHIPPED | OUTPATIENT
Start: 2022-08-12

## 2022-08-18 DIAGNOSIS — Z79.891 LONG TERM (CURRENT) USE OF OPIATE ANALGESIC: ICD-10-CM

## 2022-08-18 DIAGNOSIS — G89.29 CHRONIC LOW BACK PAIN WITHOUT SCIATICA, UNSPECIFIED BACK PAIN LATERALITY: ICD-10-CM

## 2022-08-18 DIAGNOSIS — Z76.0 MEDICATION REFILL: ICD-10-CM

## 2022-08-18 DIAGNOSIS — M54.50 CHRONIC LOW BACK PAIN WITHOUT SCIATICA, UNSPECIFIED BACK PAIN LATERALITY: ICD-10-CM

## 2022-08-18 RX ORDER — HYDROCODONE BITARTRATE AND ACETAMINOPHEN 5; 325 MG/1; MG/1
1 TABLET ORAL
Qty: 120 TABLET | Refills: 0 | Status: SHIPPED | OUTPATIENT
Start: 2022-08-18 | End: 2022-09-15 | Stop reason: SDUPTHER

## 2022-08-18 NOTE — TELEPHONE ENCOUNTER
Pt is requesting a refill. Last visit 08/02/22 Next visit 11/15/22    Requested Prescriptions     Pending Prescriptions Disp Refills    HYDROcodone-acetaminophen (NORCO) 5-325 mg per tablet 120 Tablet 0     Sig: Take 1 Tablet by mouth every six (6) hours as needed for Pain for up to 30 days. Max Daily Amount: 4 Tablets.  Indications: pain

## 2022-09-04 ENCOUNTER — PATIENT MESSAGE (OUTPATIENT)
Dept: INTERNAL MEDICINE CLINIC | Age: 32
End: 2022-09-04

## 2022-09-06 NOTE — TELEPHONE ENCOUNTER
From: Kelli Mccracken  To:  Sandip Guzman MD  Sent: 9/4/2022 1:13 PM EDT  Subject: Upper respiratory     Is it possible to have something sent to the pharmacy for a upper respiratory infection

## 2022-09-15 DIAGNOSIS — Z76.0 MEDICATION REFILL: ICD-10-CM

## 2022-09-15 DIAGNOSIS — Z79.891 LONG TERM (CURRENT) USE OF OPIATE ANALGESIC: ICD-10-CM

## 2022-09-15 DIAGNOSIS — M54.50 CHRONIC LOW BACK PAIN WITHOUT SCIATICA, UNSPECIFIED BACK PAIN LATERALITY: ICD-10-CM

## 2022-09-15 DIAGNOSIS — G89.29 CHRONIC LOW BACK PAIN WITHOUT SCIATICA, UNSPECIFIED BACK PAIN LATERALITY: ICD-10-CM

## 2022-09-19 RX ORDER — HYDROCODONE BITARTRATE AND ACETAMINOPHEN 5; 325 MG/1; MG/1
1 TABLET ORAL
Qty: 120 TABLET | Refills: 0 | Status: SHIPPED | OUTPATIENT
Start: 2022-09-19 | End: 2022-10-18 | Stop reason: SDUPTHER

## 2022-09-19 NOTE — TELEPHONE ENCOUNTER
Pt is requesting a refill. Last appt 08/02/22 Next appt 11/15/22    Requested Prescriptions     Pending Prescriptions Disp Refills    HYDROcodone-acetaminophen (NORCO) 5-325 mg per tablet 120 Tablet 0     Sig: Take 1 Tablet by mouth every six (6) hours as needed for Pain for up to 30 days. Max Daily Amount: 4 Tablets.  Indications: pain

## 2022-10-18 DIAGNOSIS — Z76.0 MEDICATION REFILL: ICD-10-CM

## 2022-10-18 DIAGNOSIS — G89.29 CHRONIC LOW BACK PAIN WITHOUT SCIATICA, UNSPECIFIED BACK PAIN LATERALITY: ICD-10-CM

## 2022-10-18 DIAGNOSIS — M54.50 CHRONIC LOW BACK PAIN WITHOUT SCIATICA, UNSPECIFIED BACK PAIN LATERALITY: ICD-10-CM

## 2022-10-18 DIAGNOSIS — Z79.891 LONG TERM (CURRENT) USE OF OPIATE ANALGESIC: ICD-10-CM

## 2022-10-18 RX ORDER — HYDROCODONE BITARTRATE AND ACETAMINOPHEN 5; 325 MG/1; MG/1
1 TABLET ORAL
Qty: 120 TABLET | Refills: 0 | Status: SHIPPED | OUTPATIENT
Start: 2022-10-18 | End: 2022-11-17

## 2022-10-20 ENCOUNTER — OFFICE VISIT (OUTPATIENT)
Dept: INTERNAL MEDICINE CLINIC | Age: 32
End: 2022-10-20
Payer: MEDICARE

## 2022-10-20 VITALS
HEART RATE: 81 BPM | RESPIRATION RATE: 15 BRPM | TEMPERATURE: 97 F | WEIGHT: 252 LBS | DIASTOLIC BLOOD PRESSURE: 76 MMHG | OXYGEN SATURATION: 94 % | HEIGHT: 62 IN | SYSTOLIC BLOOD PRESSURE: 117 MMHG | BODY MASS INDEX: 46.38 KG/M2

## 2022-10-20 DIAGNOSIS — R21 RASH OF FACE: ICD-10-CM

## 2022-10-20 DIAGNOSIS — R30.0 DYSURIA: ICD-10-CM

## 2022-10-20 DIAGNOSIS — E11.21 TYPE 2 DIABETES WITH NEPHROPATHY (HCC): Primary | ICD-10-CM

## 2022-10-20 DIAGNOSIS — Z76.0 MEDICATION REFILL: ICD-10-CM

## 2022-10-20 DIAGNOSIS — M54.50 CHRONIC LOW BACK PAIN WITHOUT SCIATICA, UNSPECIFIED BACK PAIN LATERALITY: ICD-10-CM

## 2022-10-20 DIAGNOSIS — E66.01 OBESITY, MORBID (HCC): ICD-10-CM

## 2022-10-20 DIAGNOSIS — J45.909 MILD ASTHMA WITHOUT COMPLICATION, UNSPECIFIED WHETHER PERSISTENT: ICD-10-CM

## 2022-10-20 DIAGNOSIS — G89.29 CHRONIC LOW BACK PAIN WITHOUT SCIATICA, UNSPECIFIED BACK PAIN LATERALITY: ICD-10-CM

## 2022-10-20 PROCEDURE — G8417 CALC BMI ABV UP PARAM F/U: HCPCS | Performed by: INTERNAL MEDICINE

## 2022-10-20 PROCEDURE — 99214 OFFICE O/P EST MOD 30 MIN: CPT | Performed by: INTERNAL MEDICINE

## 2022-10-20 PROCEDURE — G8427 DOCREV CUR MEDS BY ELIG CLIN: HCPCS | Performed by: INTERNAL MEDICINE

## 2022-10-20 PROCEDURE — G8510 SCR DEP NEG, NO PLAN REQD: HCPCS | Performed by: INTERNAL MEDICINE

## 2022-10-20 PROCEDURE — 3046F HEMOGLOBIN A1C LEVEL >9.0%: CPT | Performed by: INTERNAL MEDICINE

## 2022-10-20 PROCEDURE — 2022F DILAT RTA XM EVC RTNOPTHY: CPT | Performed by: INTERNAL MEDICINE

## 2022-10-20 RX ORDER — BETAMETHASONE VALERATE 1.2 MG/G
CREAM TOPICAL 2 TIMES DAILY
Qty: 15 G | Refills: 0 | Status: SHIPPED | OUTPATIENT
Start: 2022-10-20

## 2022-10-20 RX ORDER — METFORMIN HYDROCHLORIDE 1000 MG/1
1000 TABLET ORAL 2 TIMES DAILY WITH MEALS
Qty: 60 TABLET | Refills: 5 | Status: SHIPPED | OUTPATIENT
Start: 2022-10-20

## 2022-10-20 RX ORDER — DOXYCYCLINE 100 MG/1
100 CAPSULE ORAL 2 TIMES DAILY
Qty: 14 CAPSULE | Refills: 0 | Status: SHIPPED | OUTPATIENT
Start: 2022-10-20 | End: 2022-10-27

## 2022-10-20 RX ORDER — ALBUTEROL SULFATE 0.83 MG/ML
2.5 SOLUTION RESPIRATORY (INHALATION)
Qty: 100 EACH | Refills: 5 | Status: SHIPPED | OUTPATIENT
Start: 2022-10-20

## 2022-10-20 NOTE — PROGRESS NOTES
HISTORY OF PRESENT ILLNESS  Chichi Berry is a 28 y.o. female. /76 (BP 1 Location: Left upper arm, BP Patient Position: Sitting, BP Cuff Size: Large adult)   Pulse 81   Temp 97 °F (36.1 °C) (Temporal)   Resp 15   Ht 5' 2\" (1.575 m)   Wt 252 lb (114.3 kg)   LMP  (LMP Unknown)   SpO2 94%   BMI 46.09 kg/m²     Diabetes  The history is provided by the Patient. This is a chronic problem. The current episode started more than 1 week ago. The problem occurs daily. Pertinent negatives include no chest pain. Review of Systems   Constitutional:  Negative for chills and fever. Cardiovascular:  Negative for chest pain and palpitations. Genitourinary:  Positive for dysuria (several days of stinging when she voids) and urgency. Skin:  Positive for rash. About 2 week h/o papules and pustules on her face. Has been using ceravie which has helped. No new soaps of face creams. No new meds. Not aware of any unusual contacts     Physical Exam  Vitals and nursing note reviewed. Constitutional:       Appearance: Normal appearance. She is well-developed. HENT:      Head: Normocephalic and atraumatic. Cardiovascular:      Rate and Rhythm: Normal rate and regular rhythm. Pulmonary:      Effort: Pulmonary effort is normal.      Breath sounds: Normal breath sounds. Musculoskeletal:      Right lower leg: No edema. Left lower leg: No edema. Skin:     General: Skin is warm and dry. Findings: Rash present. Comments: Diffuse fine papules across all areas of her face   Neurological:      General: No focal deficit present. Mental Status: She is alert and oriented to person, place, and time. Psychiatric:         Mood and Affect: Mood normal.         Behavior: Behavior normal.       ASSESSMENT and PLAN    ICD-10-CM ICD-9-CM    1.  Type 2 diabetes with nephropathy (HCC)  Q44.43 040.95 METABOLIC PANEL, COMPREHENSIVE     583.81 HEMOGLOBIN A1C W/O EAG      metFORMIN (GLUCOPHAGE) 1,000 mg tablet      HEMOGLOBIN A1C W/O EAG      METABOLIC PANEL, COMPREHENSIVE      2. Dysuria  R30.0 788. 1 URINALYSIS W/ RFLX MICROSCOPIC      CULTURE, URINE      doxycycline (VIBRAMYCIN) 100 mg capsule      CULTURE, URINE      URINALYSIS W/ RFLX MICROSCOPIC      3. Rash of face  R21 782.1 doxycycline (VIBRAMYCIN) 100 mg capsule      betamethasone valerate (VALISONE) 0.1 % topical cream      4. Mild asthma without complication, unspecified whether persistent  J45.909 493.90 AMB SUPPLY ORDER      5. Chronic low back pain without sciatica, unspecified back pain laterality  M54.50 724.2     G89.29 338.29       6. Obesity, morbid ([facility] [address]  E66.01 278.01       7. Medication refill  Z76.0 V68.1 albuterol (PROVENTIL VENTOLIN) 2.5 mg /3 mL (0.083 %) nebu      metFORMIN (GLUCOPHAGE) 1,000 mg tablet        Update lab  Reviewed ER note from September 6. She presented with chest pain and a cough. She was worried about pneumonia (which was not found.)  Asthma is OK today but needs new nebulizer. Order will be sent  Check the urine for evidence infection  Facial rash-? Etiology. With pustules, will add doxycycline (this should also cover her urine if there is an infection  Refills as noted.  Metformin dose was adjusted by patient so will refill double strength pill  F/u as appointed in November

## 2022-10-20 NOTE — PROGRESS NOTES
1. \"Have you been to the ER, urgent care clinic since your last visit? Hospitalized since your last visit? \" No    2. \"Have you seen or consulted any other health care providers outside of the [address] [place] [address] since your last visit? \" No     3. For patients aged 39-70: Has the patient had a colonoscopy / FIT/ Cologuard? NA - based on age      If the patient is female:    4. For patients aged 41-77: Has the patient had a mammogram within the past 2 years? NA - based on age or sex      11. For patients aged 21-65: Has the patient had a pap smear?  Yes - no Care Gap present

## 2022-10-28 LAB
ALBUMIN SERPL-MCNC: 4 G/DL (ref 3.8–4.8)
ALBUMIN/GLOB SERPL: 0.8 {RATIO} (ref 1.2–2.2)
ALP SERPL-CCNC: 131 IU/L (ref 44–121)
ALT SERPL-CCNC: 103 IU/L (ref 0–32)
APPEARANCE UR: CLEAR
AST SERPL-CCNC: 88 IU/L (ref 0–40)
BACTERIA #/AREA URNS HPF: ABNORMAL /[HPF]
BACTERIA UR CULT: ABNORMAL
BACTERIA UR CULT: ABNORMAL
BILIRUB SERPL-MCNC: 0.3 MG/DL (ref 0–1.2)
BILIRUB UR QL STRIP: NEGATIVE
BUN SERPL-MCNC: 10 MG/DL (ref 6–20)
BUN/CREAT SERPL: 12 (ref 9–23)
CALCIUM SERPL-MCNC: 9.2 MG/DL (ref 8.7–10.2)
CASTS URNS QL MICRO: ABNORMAL /LPF
CHLORIDE SERPL-SCNC: 102 MMOL/L (ref 96–106)
CO2 SERPL-SCNC: 20 MMOL/L (ref 20–29)
COLOR UR: YELLOW
CREAT SERPL-MCNC: 0.85 MG/DL (ref 0.57–1)
EGFR: 93 ML/MIN/1.73
EPI CELLS #/AREA URNS HPF: ABNORMAL /HPF (ref 0–10)
GLOBULIN SER CALC-MCNC: 5 G/DL (ref 1.5–4.5)
GLUCOSE SERPL-MCNC: 155 MG/DL (ref 70–99)
GLUCOSE UR QL STRIP: ABNORMAL
HBA1C MFR BLD: 8 % (ref 4.8–5.6)
HGB UR QL STRIP: NEGATIVE
KETONES UR QL STRIP: NEGATIVE
LEUKOCYTE ESTERASE UR QL STRIP: NEGATIVE
MICRO URNS: ABNORMAL
NITRITE UR QL STRIP: NEGATIVE
PH UR STRIP: 6 [PH] (ref 5–7.5)
POTASSIUM SERPL-SCNC: 3.9 MMOL/L (ref 3.5–5.2)
PROT SERPL-MCNC: 9 G/DL (ref 6–8.5)
PROT UR QL STRIP: ABNORMAL
RBC #/AREA URNS HPF: ABNORMAL /HPF (ref 0–2)
SODIUM SERPL-SCNC: 138 MMOL/L (ref 134–144)
SP GR UR STRIP: >=1.03 (ref 1–1.03)
UROBILINOGEN UR STRIP-MCNC: 0.2 MG/DL (ref 0.2–1)
WBC #/AREA URNS HPF: >30 /HPF (ref 0–5)

## 2022-11-01 RX ORDER — CIPROFLOXACIN 250 MG/1
250 TABLET, FILM COATED ORAL EVERY 12 HOURS
Qty: 14 TABLET | Refills: 0 | Status: SHIPPED | OUTPATIENT
Start: 2022-11-01 | End: 2022-11-08

## 2022-11-01 NOTE — PROGRESS NOTES
Orders Placed This Encounter    ciprofloxacin HCl (CIPRO) 250 mg tablet     Sig: Take 1 Tablet by mouth every twelve (12) hours for 7 days.      Dispense:  14 Tablet     Refill:  0

## 2022-11-01 NOTE — PROGRESS NOTES
Please advise her that the final report did find a urine infection and I have sent an antibiotic to the [address] [place] [address] on [address]

## 2022-11-15 ENCOUNTER — OFFICE VISIT (OUTPATIENT)
Dept: INTERNAL MEDICINE CLINIC | Age: 32
End: 2022-11-15
Payer: MEDICARE

## 2022-11-15 VITALS
HEART RATE: 79 BPM | BODY MASS INDEX: 47.07 KG/M2 | SYSTOLIC BLOOD PRESSURE: 128 MMHG | DIASTOLIC BLOOD PRESSURE: 89 MMHG | RESPIRATION RATE: 16 BRPM | TEMPERATURE: 97.9 F | HEIGHT: 62 IN | WEIGHT: 255.8 LBS | OXYGEN SATURATION: 97 %

## 2022-11-15 DIAGNOSIS — E66.01 MORBID OBESITY (HCC): ICD-10-CM

## 2022-11-15 DIAGNOSIS — M54.50 CHRONIC LOW BACK PAIN WITHOUT SCIATICA, UNSPECIFIED BACK PAIN LATERALITY: ICD-10-CM

## 2022-11-15 DIAGNOSIS — R79.89 ELEVATED LIVER FUNCTION TESTS: Primary | ICD-10-CM

## 2022-11-15 DIAGNOSIS — Z76.0 MEDICATION REFILL: ICD-10-CM

## 2022-11-15 DIAGNOSIS — R21 RASH OF FACE: ICD-10-CM

## 2022-11-15 DIAGNOSIS — G89.29 CHRONIC LOW BACK PAIN WITHOUT SCIATICA, UNSPECIFIED BACK PAIN LATERALITY: ICD-10-CM

## 2022-11-15 DIAGNOSIS — Z79.891 LONG TERM (CURRENT) USE OF OPIATE ANALGESIC: ICD-10-CM

## 2022-11-15 PROCEDURE — G8427 DOCREV CUR MEDS BY ELIG CLIN: HCPCS | Performed by: INTERNAL MEDICINE

## 2022-11-15 PROCEDURE — G8417 CALC BMI ABV UP PARAM F/U: HCPCS | Performed by: INTERNAL MEDICINE

## 2022-11-15 PROCEDURE — 99214 OFFICE O/P EST MOD 30 MIN: CPT | Performed by: INTERNAL MEDICINE

## 2022-11-15 PROCEDURE — G8510 SCR DEP NEG, NO PLAN REQD: HCPCS | Performed by: INTERNAL MEDICINE

## 2022-11-15 RX ORDER — BETAMETHASONE VALERATE 1.2 MG/G
CREAM TOPICAL 2 TIMES DAILY
Qty: 15 G | Refills: 5 | Status: SHIPPED | OUTPATIENT
Start: 2022-11-15

## 2022-11-15 RX ORDER — HYDROCODONE BITARTRATE AND ACETAMINOPHEN 5; 325 MG/1; MG/1
1 TABLET ORAL
Qty: 110 TABLET | Refills: 0 | Status: SHIPPED | OUTPATIENT
Start: 2022-11-15 | End: 2022-12-15

## 2022-11-15 NOTE — PROGRESS NOTES
HISTORY OF PRESENT ILLNESS  Betito Blankenship is a 28 y.o. female. /89 (BP 1 Location: Left upper arm)   Pulse 79   Temp 97.9 °F (36.6 °C)   Resp 16   Ht 5' 2\" (1.575 m)   Wt 255 lb 12.8 oz (116 kg)   LMP  (LMP Unknown)   SpO2 97%   BMI 46.79 kg/m²     Hypertension   The history is provided by the Patient. This is a chronic problem. The current episode started more than 1 week ago. Risk factors include dyslipidemia and diabetes mellitus. Diabetes  The history is provided by the Patient. This is a chronic problem. The current episode started more than 1 week ago. The problem occurs daily. Cholesterol Problem  The history is provided by the Patient. This is a chronic problem. The current episode started more than 1 week ago. The problem occurs daily. Review of Systems   Constitutional: Negative. Respiratory: Negative. Cardiovascular: Negative. Gastrointestinal: Negative. Neurological: Negative. Endo/Heme/Allergies:  Negative for polydipsia. Physical Exam  Vitals and nursing note reviewed. Constitutional:       Appearance: Normal appearance. She is well-developed. HENT:      Head: Normocephalic and atraumatic. Cardiovascular:      Rate and Rhythm: Normal rate and regular rhythm. Pulmonary:      Effort: Pulmonary effort is normal.      Breath sounds: Normal breath sounds. Musculoskeletal:      Right lower leg: No edema. Left lower leg: No edema. Skin:     General: Skin is warm and dry. Neurological:      General: No focal deficit present. Mental Status: She is alert and oriented to person, place, and time. Psychiatric:         Mood and Affect: Mood normal.         Behavior: Behavior normal.       ASSESSMENT and PLAN    ICD-10-CM ICD-9-CM    1.  Elevated liver function tests  R79.89 790.6 US ABD COMP      HEPATIC FUNCTION PANEL      HEPATITIS C AB      HEP B SURFACE AB      HEP B SURFACE AG      FERRITIN      GGT      HEPATITIS B CORE AB, TOTAL      IRON PROFILE      2. Rash of face  R21 782.1 betamethasone valerate (VALISONE) 0.1 % topical cream      3. Medication refill  Z76.0 V68.1 HYDROcodone-acetaminophen (NORCO) 5-325 mg per tablet      4. Chronic low back pain without sciatica, unspecified back pain laterality  M54.50 724.2 HYDROcodone-acetaminophen (NORCO) 5-325 mg per tablet    G89.29 338.29       5. Long term (current) use of opiate analgesic  Z79.891 V58.69 HYDROcodone-acetaminophen (NORCO) 5-325 mg per tablet      6. Morbid obesity ([facility] [address]  E66.01 278.01 REFERRAL TO WEIGHT LOSS        Discussed the abnormal LFTs  ? Med related, ? Diabetes related. ? Fatty liver  Update lab including additional liver tests. Request U/S. May need to refer to GI/hepatology for an evaluation  Blood sugar is actually better! But weight is not doing well. We are somewhat limited in what we can offer due to her cardiac status.  Will refer to non-surgical weight loss clinic  F/u 3 months for WWE , HTN, DM, chol, weight check

## 2022-11-16 LAB
ALBUMIN SERPL-MCNC: 3.8 G/DL (ref 3.8–4.8)
ALP SERPL-CCNC: 140 IU/L (ref 44–121)
ALT SERPL-CCNC: 83 IU/L (ref 0–32)
AST SERPL-CCNC: 56 IU/L (ref 0–40)
BILIRUB DIRECT SERPL-MCNC: 0.11 MG/DL (ref 0–0.4)
BILIRUB SERPL-MCNC: 0.4 MG/DL (ref 0–1.2)
FERRITIN SERPL-MCNC: 222 NG/ML (ref 15–150)
GGT SERPL-CCNC: 76 IU/L (ref 0–60)
HBV CORE AB SERPL QL IA: NEGATIVE
HBV SURFACE AB SER QL: REACTIVE
HBV SURFACE AG SERPL QL IA: NEGATIVE
HCV AB S/CO SERPL IA: <0.1 S/CO RATIO (ref 0–0.9)
IRON SATN MFR SERPL: 20 % (ref 15–55)
IRON SERPL-MCNC: 57 UG/DL (ref 27–159)
PROT SERPL-MCNC: 8.6 G/DL (ref 6–8.5)
TIBC SERPL-MCNC: 286 UG/DL (ref 250–450)
UIBC SERPL-MCNC: 229 UG/DL (ref 131–425)

## 2022-11-18 DIAGNOSIS — Z76.0 MEDICATION REFILL: ICD-10-CM

## 2022-11-18 RX ORDER — FLUTICASONE PROPIONATE 50 MCG
SPRAY, SUSPENSION (ML) NASAL
Qty: 16 G | Refills: 5 | Status: SHIPPED | OUTPATIENT
Start: 2022-11-18

## 2022-12-01 ENCOUNTER — VIRTUAL VISIT (OUTPATIENT)
Dept: INTERNAL MEDICINE CLINIC | Age: 32
End: 2022-12-01
Payer: MEDICARE

## 2022-12-01 DIAGNOSIS — J06.9 UPPER RESPIRATORY TRACT INFECTION, UNSPECIFIED TYPE: ICD-10-CM

## 2022-12-01 DIAGNOSIS — G47.30 SLEEP APNEA, UNSPECIFIED TYPE: ICD-10-CM

## 2022-12-01 DIAGNOSIS — R09.81 SINUS CONGESTION: Primary | ICD-10-CM

## 2022-12-01 PROCEDURE — G8427 DOCREV CUR MEDS BY ELIG CLIN: HCPCS | Performed by: INTERNAL MEDICINE

## 2022-12-01 PROCEDURE — G8510 SCR DEP NEG, NO PLAN REQD: HCPCS | Performed by: INTERNAL MEDICINE

## 2022-12-01 PROCEDURE — 99213 OFFICE O/P EST LOW 20 MIN: CPT | Performed by: INTERNAL MEDICINE

## 2022-12-01 RX ORDER — PREDNISONE 10 MG/1
TABLET ORAL
Qty: 9 TABLET | Refills: 0 | Status: SHIPPED | OUTPATIENT
Start: 2022-12-01

## 2022-12-01 NOTE — PROGRESS NOTES
1. \"Have you been to the ER, urgent care clinic since your last visit? Hospitalized since your last visit? \" No    2. \"Have you seen or consulted any other health care providers outside of the [address] [place] [address] since your last visit? \" No     3. For patients aged 39-70: Has the patient had a colonoscopy / FIT/ Cologuard? NA - based on age      If the patient is female:    4. For patients aged 41-77: Has the patient had a mammogram within the past 2 years? NA - based on age or sex      11. For patients aged 21-65: Has the patient had a pap smear?  Yes - no Care Gap present

## 2022-12-01 NOTE — PROGRESS NOTES
Leandro Paige is a 28 y.o. female who was seen by synchronous (real-time) audio-video technology on 12/1/2022. Consent: Leandro Paige, who was seen by synchronous (real-time) audio-video technology, and/or her healthcare decision maker, is aware that this patient-initiated, Telehealth encounter on 12/1/2022 is a billable service, with coverage as determined by her insurance carrier. She is aware that she may receive a bill and has provided verbal consent to proceed: Yes. Assessment & Plan:   Diagnoses and all orders for this visit:    1. Sinus congestion  -     oxymetazoline (AFRIN) 0.05 % nasal mist; 2 Sprays by Both Nostrils route two (2) times a day. -     predniSONE (DELTASONE) 10 mg tablet; Take 2 tablets by mouth for 3 days, then one for three days    2. Upper respiratory tract infection, unspecified type  -     oxymetazoline (AFRIN) 0.05 % nasal mist; 2 Sprays by Both Nostrils route two (2) times a day. -     predniSONE (DELTASONE) 10 mg tablet; Take 2 tablets by mouth for 3 days, then one for three days    3. Sleep apnea, unspecified type  -     REFERRAL TO SLEEP STUDIES    She ois doing better but is very congested. Will add Afrin nasal spray up to 3 days. Pt advised not to continue past that. Add prednisone for edema (flonase is not working well currently)    Incidental referral to [facility] sleep clinic. She needs new CPAP    F/u in February for WWE            712  Subjective:   Leandro Paige is a 28 y.o. female who was seen for Dehydration and Cold Symptoms (improving)    About 9 days. Cough and congestion. Felt very dehydrated. She drank a lot of fluids inc gatorade, pedialyte, water, etc.  Having trouble sleeping due to congestion and coughing. No fever or chills  Took a home COVID test which was negative    Tried mucinex. Coricidin HPB    Cold Symptoms  The history is provided by the Patient. This is a new problem.       Prior to Admission medications    Medication Sig Start Date End Date Taking? Authorizing Provider   oxymetazoline (AFRIN) 0.05 % nasal mist 2 Sprays by Both Nostrils route two (2) times a day. 12/1/22  Yes Forrest Vásquez MD   predniSONE (DELTASONE) 10 mg tablet Take 2 tablets by mouth for 3 days, then one for three days 12/1/22  Yes Forrest Vásquez MD   fluticasone propionate [facility]) 50 mcg/actuation nasal spray instill 2 sprays into each nostril once daily 11/18/22  Yes Lorene Pendleton MD   betamethasone valerate (VALISONE) 0.1 % topical cream Apply  to affected area two (2) times a day. A very thin coating to rash on face 11/15/22  Yes Forrest Vásquez MD   HYDROcodone-acetaminophen [facility][1]) 5-325 mg per tablet Take 1 Tablet by mouth every six (6) hours as needed for Pain for up to 30 days. Max Daily Amount: 4 Tablets. Indications: pain 11/15/22 12/15/22 Yes Forrest Vásquez MD   albuterol (PROVENTIL VENTOLIN) 2.5 mg /3 mL (0.083 %) nebu 3 mL by Nebulization route three (3) times daily as needed (wheezing). Indications: bronchospasm 10/20/22  Yes Forrest Vásquez MD   metFORMIN (GLUCOPHAGE) 1,000 mg tablet Take 1 Tablet by mouth two (2) times daily (with meals). 10/20/22  Yes Forrest Vásquez MD   glipiZIDE SR (GLUCOTROL XL) 10 mg CR tablet Take 1 Tablet by mouth daily.  10/7/22  Yes Forrest Vásquez MD   fluticasone propionate (Flovent HFA) 110 mcg/actuation inhaler inhale 1 puff by mouth every 12 hours 10/7/22  Yes Forrest Vásquez MD   atorvastatin (LIPITOR) 40 mg tablet take 1 tablet by mouth nightly 8/12/22  Yes Forrest Vásquez MD   albuterol (PROVENTIL HFA, VENTOLIN HFA, PROAIR HFA) 90 mcg/actuation inhaler inhale 2 puffs by mouth every 6 hours if needed for wheezing 8/2/22  Yes Forrest Vásquez MD   Jardiance 25 mg tablet take 1 tablet by mouth once daily 7/13/22  Yes Isi Mcgill PA-C   Entresto 49-51 mg tab tablet take 1 tablet by mouth twice a day 6/7/22  Yes Annie Baltazar MD   carvediloL (COREG) 6.25 mg tablet take 1 tablet by mouth twice a day with meals 4/25/22  Yes Raheem Charles MD   loratadine (CLARITIN REDITABS) 10 mg dissolvable tablet Take 1 Tablet by mouth daily. 2/18/22  Yes Betzaida Jackson MD   ergocalciferol (Vitamin D2) 1,250 mcg (50,000 unit) capsule Take 1 Capsule by mouth every seven (7) days. 2/18/22  Yes Betzaida Jackson MD   famotidine (Pepcid) 20 mg tablet Take 1 Tablet by mouth daily. 2/10/22  Yes Betzaida Jackson MD   aspirin delayed-release 81 mg tablet Take 1 Tab by mouth daily. 10/20/20  Yes Jaycob Ann MD   diphenhydrAMINE (BENADRYL) 25 mg capsule 25 mg every six (6) hours as needed. 3/21/14  Yes Provider, Historical     Allergies   Allergen Reactions    Other Food Anaphylaxis     [place], guacamole    Argan Kernal Oil (Arganina Spinosa) Hives    Naproxen Swelling    Percocet [Oxycodone-Acetaminophen] Other (comments)     Pt not allergic to this medication        Patient Active Problem List    Diagnosis Date Noted    Dilated cardiomyopathy ([address] 03/24/2021    Tobacco abuse 28/87/6284    Acute systolic heart failure ([address] 10/16/2020    Shortness of breath 08/29/2020    Type 2 diabetes with nephropathy ([address] 12/17/2019    Asthma     Obesity, morbid ([address] 11/27/2017    Controlled type 2 diabetes mellitus without complication, without long-term current use of insulin ([address] 06/29/2017    Nasal congestion 07/16/2015    Chronic back pain 07/16/2015    Anxiety 07/16/2015     Past Medical History:   Diagnosis Date    ASCUS (atypical squamous cells of undetermined significance) on Pap smear 3/11    Asthma     Bipolar disorder ([address]     Cardiomyopathy ([address] 10/2020    EF 38% on echo. Global hypokinesis.  Cardiac CT no significant obstructive disease    Chlamydia      \"    Chronic back pain     Congestive heart failure ([address] 09/2020    COVID-19 02/09/2021    Depression     Diabetes mellitus ([address] 01/02/2017    Elevated blood sugar     GC (gonococcus infection) 2007/ 2004    Heart failure ([facility] HPV (human papilloma virus) infection 3/11    Irregular menses     Schizophrenia ([facility]     Sleep apnea 2/14    Smoker     Suicide attempt ([facility] 5/08    with tylenol    Suicide attempt ([facility] 10/09    Trichomonal vaginitis 8/10    Uterine fibroid        ROS      Objective: There were no vitals taken for this visit. General: alert, cooperative, no distress   Mental  status: normal mood, behavior, speech, dress, motor activity, and thought processes, able to follow commands   HENT: NCAT   Neck: no visualized mass   Resp: no respiratory distress   Neuro: no gross deficits   Skin: no discoloration or lesions of concern on visible areas   Psychiatric: normal affect, consistent with stated mood, no evidence of hallucinations     Additional exam findings: We discussed the expected course, resolution and complications of the diagnosis(es) in detail. Medication risks, benefits, costs, interactions, and alternatives were discussed as indicated. I advised her to contact the office if her condition worsens, changes or fails to improve as anticipated. She expressed understanding with the diagnosis(es) and plan. Johnathon Rocha is a 28 y.o. female who was evaluated by a video visit encounter for concerns as above. Patient identification was verified prior to start of the visit. A caregiver was present when appropriate. Due to this being a TeleHealth encounter (During FTYUS-89 public health emergency), evaluation of the following organ systems was limited: Vitals/Constitutional/EENT/Resp/CV/GI//MS/Neuro/Skin/Heme-Lymph-Imm. Pursuant to the emergency declaration under the [facility] waiver authority and the [employer] and [employer] Act, this Virtual  Visit was conducted, with patient's (and/or legal guardian's) consent, to reduce the patient's risk of exposure to COVID-19 and provide necessary medical care.      Services were provided through a video synchronous discussion virtually to substitute for in-person clinic visit. Patient and provider were located at their individual homes.       Colby Avery MD

## 2022-12-14 RX ORDER — SACUBITRIL AND VALSARTAN 49; 51 MG/1; MG/1
TABLET, FILM COATED ORAL
Qty: 60 TABLET | Refills: 5 | OUTPATIENT
Start: 2022-12-14

## 2022-12-15 DIAGNOSIS — Z76.0 MEDICATION REFILL: ICD-10-CM

## 2022-12-15 DIAGNOSIS — Z79.891 LONG TERM (CURRENT) USE OF OPIATE ANALGESIC: ICD-10-CM

## 2022-12-15 DIAGNOSIS — G89.29 CHRONIC LOW BACK PAIN WITHOUT SCIATICA, UNSPECIFIED BACK PAIN LATERALITY: ICD-10-CM

## 2022-12-15 DIAGNOSIS — M54.50 CHRONIC LOW BACK PAIN WITHOUT SCIATICA, UNSPECIFIED BACK PAIN LATERALITY: ICD-10-CM

## 2022-12-15 RX ORDER — HYDROCODONE BITARTRATE AND ACETAMINOPHEN 5; 325 MG/1; MG/1
1 TABLET ORAL
Qty: 105 TABLET | Refills: 0 | Status: SHIPPED | OUTPATIENT
Start: 2022-12-15 | End: 2023-01-14

## 2022-12-16 DIAGNOSIS — E11.21 TYPE 2 DIABETES WITH NEPHROPATHY (HCC): ICD-10-CM

## 2022-12-16 DIAGNOSIS — Z76.0 MEDICATION REFILL: ICD-10-CM

## 2022-12-16 RX ORDER — METFORMIN HYDROCHLORIDE 1000 MG/1
1000 TABLET ORAL 2 TIMES DAILY WITH MEALS
Qty: 180 TABLET | Refills: 3 | Status: SHIPPED | OUTPATIENT
Start: 2022-12-16

## 2022-12-16 NOTE — PROGRESS NOTES
Insurance requesting 90 d supply    Orders Placed This Encounter    metFORMIN (GLUCOPHAGE) 1,000 mg tablet     Sig: Take 1 Tablet by mouth two (2) times daily (with meals).      Dispense:  180 Tablet     Refill:  3

## 2023-01-12 ENCOUNTER — TELEPHONE (OUTPATIENT)
Dept: INTERNAL MEDICINE CLINIC | Age: 33
End: 2023-01-12

## 2023-01-12 DIAGNOSIS — J45.909 MILD ASTHMA WITHOUT COMPLICATION, UNSPECIFIED WHETHER PERSISTENT: Primary | ICD-10-CM

## 2023-01-13 NOTE — TELEPHONE ENCOUNTER
----- Message from Cm Archuleta sent at 1/12/2023  8:32 AM EST -----  Subject: Message to Provider    QUESTIONS  Information for Provider? patient needs script for Nebulizer sent to [facility] for her asthma- [facility] [phone]  ---------------------------------------------------------------------------  --------------  [employer] [employer]  [phone]; OK to leave message on voicemail  ---------------------------------------------------------------------------  --------------  SCRIPT ANSWERS  Relationship to Patient?  Self

## 2023-01-13 NOTE — TELEPHONE ENCOUNTER
----- Message from Araceli Dukes sent at 1/12/2023  8:29 AM EST -----  Subject: Referral Request    Reason for referral request? patient needs referral faxed to [facility] at [phone] office phone number is [phone]  Provider patient wants to be referred to(if known):     Provider Phone Number(if known):[phone]    Additional Information for Provider?   ---------------------------------------------------------------------------  --------------  [address]    [phone]; OK to leave message on voicemail  ---------------------------------------------------------------------------  --------------

## 2023-01-13 NOTE — TELEPHONE ENCOUNTER
Orders Placed This Encounter    AMB SUPPLY ORDER     Nebulizer and supplies    DX: asthma    [facility] [phone]

## 2023-01-17 DIAGNOSIS — G89.29 CHRONIC LOW BACK PAIN WITHOUT SCIATICA, UNSPECIFIED BACK PAIN LATERALITY: ICD-10-CM

## 2023-01-17 DIAGNOSIS — Z76.0 MEDICATION REFILL: ICD-10-CM

## 2023-01-17 DIAGNOSIS — Z79.891 LONG TERM (CURRENT) USE OF OPIATE ANALGESIC: ICD-10-CM

## 2023-01-17 DIAGNOSIS — M54.50 CHRONIC LOW BACK PAIN WITHOUT SCIATICA, UNSPECIFIED BACK PAIN LATERALITY: ICD-10-CM

## 2023-01-17 RX ORDER — HYDROCODONE BITARTRATE AND ACETAMINOPHEN 5; 325 MG/1; MG/1
1 TABLET ORAL
Qty: 105 TABLET | Refills: 0 | Status: SHIPPED | OUTPATIENT
Start: 2023-01-17 | End: 2023-02-16

## 2023-02-10 ENCOUNTER — TELEPHONE (OUTPATIENT)
Dept: INTERNAL MEDICINE CLINIC | Age: 33
End: 2023-02-10

## 2023-02-10 NOTE — TELEPHONE ENCOUNTER
Chepe Grigsby from [facility] called stating that they need clinical notes explaining why the pt needs a nebulizer. Fax number [phone].  Phone num [phone] extension [phone]

## 2023-02-16 ENCOUNTER — TELEPHONE (OUTPATIENT)
Facility: CLINIC | Age: 33
End: 2023-02-16

## 2023-02-16 DIAGNOSIS — G89.29 CHRONIC BILATERAL LOW BACK PAIN, UNSPECIFIED WHETHER SCIATICA PRESENT: Primary | ICD-10-CM

## 2023-02-16 DIAGNOSIS — M54.50 CHRONIC BILATERAL LOW BACK PAIN, UNSPECIFIED WHETHER SCIATICA PRESENT: Primary | ICD-10-CM

## 2023-02-16 DIAGNOSIS — Z79.891 ENCOUNTER FOR LONG-TERM USE OF OPIATE ANALGESIC: ICD-10-CM

## 2023-02-16 RX ORDER — HYDROCODONE BITARTRATE AND ACETAMINOPHEN 5; 325 MG/1; MG/1
1 TABLET ORAL EVERY 6 HOURS PRN
Qty: 105 TABLET | Refills: 0 | Status: SHIPPED | OUTPATIENT
Start: 2023-02-16 | End: 2023-03-18

## 2023-02-16 NOTE — TELEPHONE ENCOUNTER
PT requesting refill of 'Hydrocodone-acetaminophen (NORCO) 5-325 mg per tablet'  Last Ov 12/2/22 , no future appts made at this time.        Obdulia Covington #[id number] [facility] [address] [place], [address] [facility] [address]   Phone:  [phone]  Fax:  [phone]

## 2023-03-07 RX ORDER — FAMOTIDINE 20 MG/1
TABLET, FILM COATED ORAL
Qty: 90 TABLET | Refills: 1 | Status: SHIPPED | OUTPATIENT
Start: 2023-03-07

## 2023-03-15 DIAGNOSIS — G89.29 CHRONIC BILATERAL LOW BACK PAIN, UNSPECIFIED WHETHER SCIATICA PRESENT: ICD-10-CM

## 2023-03-15 DIAGNOSIS — M54.50 CHRONIC BILATERAL LOW BACK PAIN, UNSPECIFIED WHETHER SCIATICA PRESENT: ICD-10-CM

## 2023-03-15 DIAGNOSIS — Z79.891 ENCOUNTER FOR LONG-TERM USE OF OPIATE ANALGESIC: ICD-10-CM

## 2023-03-18 RX ORDER — HYDROCODONE BITARTRATE AND ACETAMINOPHEN 5; 325 MG/1; MG/1
1 TABLET ORAL EVERY 6 HOURS PRN
Qty: 105 TABLET | Refills: 0 | Status: SHIPPED | OUTPATIENT
Start: 2023-03-18 | End: 2023-04-17

## 2023-03-23 ENCOUNTER — OFFICE VISIT (OUTPATIENT)
Facility: CLINIC | Age: 33
End: 2023-03-23
Payer: COMMERCIAL

## 2023-03-23 VITALS
RESPIRATION RATE: 18 BRPM | SYSTOLIC BLOOD PRESSURE: 128 MMHG | HEIGHT: 62 IN | DIASTOLIC BLOOD PRESSURE: 91 MMHG | HEART RATE: 77 BPM | WEIGHT: 245 LBS | TEMPERATURE: 96.8 F | OXYGEN SATURATION: 97 % | BODY MASS INDEX: 45.08 KG/M2

## 2023-03-23 DIAGNOSIS — G89.29 CHRONIC LOW BACK PAIN WITHOUT SCIATICA, UNSPECIFIED BACK PAIN LATERALITY: ICD-10-CM

## 2023-03-23 DIAGNOSIS — E11.9 CONTROLLED TYPE 2 DIABETES MELLITUS WITHOUT COMPLICATION, WITHOUT LONG-TERM CURRENT USE OF INSULIN (HCC): Primary | ICD-10-CM

## 2023-03-23 DIAGNOSIS — M54.50 CHRONIC LOW BACK PAIN WITHOUT SCIATICA, UNSPECIFIED BACK PAIN LATERALITY: ICD-10-CM

## 2023-03-23 DIAGNOSIS — F31.9 BIPOLAR AFFECTIVE DISORDER, REMISSION STATUS UNSPECIFIED (HCC): ICD-10-CM

## 2023-03-23 DIAGNOSIS — E66.01 OBESITY, CLASS III, BMI 40-49.9 (MORBID OBESITY) (HCC): ICD-10-CM

## 2023-03-23 DIAGNOSIS — I42.0 DILATED CARDIOMYOPATHY (HCC): ICD-10-CM

## 2023-03-23 DIAGNOSIS — Z79.891 LONG TERM (CURRENT) USE OF OPIATE ANALGESIC: ICD-10-CM

## 2023-03-23 PROBLEM — E11.22 TYPE 2 DIABETES MELLITUS WITH CHRONIC KIDNEY DISEASE (HCC): Status: ACTIVE | Noted: 2023-03-23

## 2023-03-23 LAB — HBA1C MFR BLD: 6.8 %

## 2023-03-23 PROCEDURE — 83036 HEMOGLOBIN GLYCOSYLATED A1C: CPT | Performed by: INTERNAL MEDICINE

## 2023-03-23 PROCEDURE — 99214 OFFICE O/P EST MOD 30 MIN: CPT | Performed by: INTERNAL MEDICINE

## 2023-03-23 RX ORDER — ERGOCALCIFEROL 1.25 MG/1
50000 CAPSULE ORAL
Qty: 4 CAPSULE | Refills: 11 | Status: SHIPPED | OUTPATIENT
Start: 2023-03-23

## 2023-03-23 SDOH — ECONOMIC STABILITY: INCOME INSECURITY: HOW HARD IS IT FOR YOU TO PAY FOR THE VERY BASICS LIKE FOOD, HOUSING, MEDICAL CARE, AND HEATING?: NOT HARD AT ALL

## 2023-03-23 SDOH — ECONOMIC STABILITY: HOUSING INSECURITY
IN THE LAST 12 MONTHS, WAS THERE A TIME WHEN YOU DID NOT HAVE A STEADY PLACE TO SLEEP OR SLEPT IN A SHELTER (INCLUDING NOW)?: NO

## 2023-03-23 SDOH — ECONOMIC STABILITY: FOOD INSECURITY: WITHIN THE PAST 12 MONTHS, YOU WORRIED THAT YOUR FOOD WOULD RUN OUT BEFORE YOU GOT MONEY TO BUY MORE.: NEVER TRUE

## 2023-03-23 SDOH — ECONOMIC STABILITY: FOOD INSECURITY: WITHIN THE PAST 12 MONTHS, THE FOOD YOU BOUGHT JUST DIDN'T LAST AND YOU DIDN'T HAVE MONEY TO GET MORE.: NEVER TRUE

## 2023-03-23 ASSESSMENT — PATIENT HEALTH QUESTIONNAIRE - PHQ9
7. TROUBLE CONCENTRATING ON THINGS, SUCH AS READING THE NEWSPAPER OR WATCHING TELEVISION: 0
10. IF YOU CHECKED OFF ANY PROBLEMS, HOW DIFFICULT HAVE THESE PROBLEMS MADE IT FOR YOU TO DO YOUR WORK, TAKE CARE OF THINGS AT HOME, OR GET ALONG WITH OTHER PEOPLE: 0
2. FEELING DOWN, DEPRESSED OR HOPELESS: 0
9. THOUGHTS THAT YOU WOULD BE BETTER OFF DEAD, OR OF HURTING YOURSELF: 0
8. MOVING OR SPEAKING SO SLOWLY THAT OTHER PEOPLE COULD HAVE NOTICED. OR THE OPPOSITE, BEING SO FIGETY OR RESTLESS THAT YOU HAVE BEEN MOVING AROUND A LOT MORE THAN USUAL: 0
SUM OF ALL RESPONSES TO PHQ QUESTIONS 1-9: 0
5. POOR APPETITE OR OVEREATING: 0
1. LITTLE INTEREST OR PLEASURE IN DOING THINGS: 0
SUM OF ALL RESPONSES TO PHQ QUESTIONS 1-9: 0
3. TROUBLE FALLING OR STAYING ASLEEP: 0
6. FEELING BAD ABOUT YOURSELF - OR THAT YOU ARE A FAILURE OR HAVE LET YOURSELF OR YOUR FAMILY DOWN: 0
4. FEELING TIRED OR HAVING LITTLE ENERGY: 0
SUM OF ALL RESPONSES TO PHQ9 QUESTIONS 1 & 2: 0

## 2023-03-23 ASSESSMENT — ENCOUNTER SYMPTOMS: RESPIRATORY NEGATIVE: 1

## 2023-03-23 NOTE — PROGRESS NOTES
HISTORY OF PRESENT ILLNESS      Cem Eaton is a 28 y.o. female. BP (!) 128/91 (Site: Left Upper Arm, Position: Sitting, Cuff Size: Large Adult)   Pulse 77   Temp 96.8 °F (36 °C) (Temporal)   Resp 18   Ht 5' 2.01\" (1.575 m)   Wt 245 lb (111.1 kg)   LMP 08/01/2022 (Within Months)   SpO2 97%   BMI 44.80 kg/m²     Medication Refill  This is a new problem. Associated symptoms include arthralgias (left shoulder and chest wall pain). Diabetes  She presents for her follow-up diabetic visit. She has type 2 diabetes mellitus. Her disease course has been improving. Review of Systems   Constitutional: Negative. Respiratory: Negative. Cardiovascular: Negative. Musculoskeletal:  Positive for arthralgias (left shoulder and chest wall pain). Physical Exam  Vitals and nursing note reviewed. Constitutional:       Appearance: Normal appearance. HENT:      Head: Normocephalic and atraumatic. Cardiovascular:      Rate and Rhythm: Normal rate and regular rhythm. Pulmonary:      Effort: Pulmonary effort is normal.      Breath sounds: Normal breath sounds. Musculoskeletal:      Right lower leg: No edema. Left lower leg: No edema. Comments: Nml ROM left shoulder. NML radial pulse. NML strength of RUE   Skin:     General: Skin is warm and dry. Neurological:      General: No focal deficit present. Mental Status: She is alert and oriented to person, place, and time. Psychiatric:         Mood and Affect: Mood normal.         Behavior: Behavior normal.       ASSESSMENT and PLAN      ICD-10-CM    1. Controlled type 2 diabetes mellitus without complication, without long-term current use of insulin ([facility])  E11.9 AMB POC HEMOGLOBIN A1C      2. Bipolar affective disorder, remission status unspecified ([facility]  F31.9       3. Obesity, Class III, BMI 40-49.9 (morbid obesity) ([facility])  E66.01       4. Dilated cardiomyopathy ([facility]  I42.0       5.  Chronic low back pain without sciatica,

## 2023-03-23 NOTE — PROGRESS NOTES
1. \"Have you been to the ER, urgent care clinic since your last visit? Hospitalized since your last visit? \" Yes [facility]  for Chest Pain 3/2023    2. \"Have you seen or consulted any other health care providers outside of the [address] [place] [address] since your last visit? \" No     3. For patients aged 39-70: Has the patient had a colonoscopy / FIT/ Cologuard? NA - based on age      If the patient is female:    4. For patients aged 41-77: Has the patient had a mammogram within the past 2 years? NA - based on age or sex      11. For patients aged 21-65: Has the patient had a pap smear?  No

## 2023-04-18 NOTE — TELEPHONE ENCOUNTER
PT requesting refill. Last ov 3/23/23,no future appts made.     fluconazole (DIFLUCAN) 200 mg tablet [[id number]]  DISCONTINUED    Order Details  Dose, Route, Frequency: As Directed   Dispense Quantity: 3 Tablet Refills: 2          Sig: take 1 tablet by mouth daily for 3 days

## 2023-04-19 RX ORDER — FLUCONAZOLE 200 MG/1
200 TABLET ORAL DAILY
COMMUNITY
Start: 2023-03-19 | End: 2023-04-19 | Stop reason: SDUPTHER

## 2023-04-19 RX ORDER — FLUCONAZOLE 200 MG/1
200 TABLET ORAL DAILY
Qty: 3 TABLET | Refills: 0 | Status: SHIPPED | OUTPATIENT
Start: 2023-04-19

## 2023-04-24 NOTE — TELEPHONE ENCOUNTER
PCP:  Roosevelt Islas MD    Last appt: [unfilled]  Future Appointments   Date Time Provider Tere Lim   6/21/2023  1:00 PM Nichole Cook MD [facility] AMB       Requested Prescriptions     Pending Prescriptions Disp Refills    carvedilol (COREG) 6.25 MG tablet [Pharmacy Med Name: CARVEDILOL 6.25 MG TABLET] 180 tablet 0     Sig: take 1 tablet by mouth twice a day with meals

## 2023-04-26 RX ORDER — CARVEDILOL 6.25 MG/1
TABLET ORAL
Qty: 180 TABLET | Refills: 0 | Status: SHIPPED | OUTPATIENT
Start: 2023-04-26

## 2023-05-16 DIAGNOSIS — G89.29 CHRONIC BILATERAL LOW BACK PAIN, UNSPECIFIED WHETHER SCIATICA PRESENT: ICD-10-CM

## 2023-05-16 DIAGNOSIS — Z79.891 ENCOUNTER FOR LONG-TERM USE OF OPIATE ANALGESIC: ICD-10-CM

## 2023-05-16 DIAGNOSIS — M54.50 CHRONIC BILATERAL LOW BACK PAIN, UNSPECIFIED WHETHER SCIATICA PRESENT: ICD-10-CM

## 2023-05-16 RX ORDER — FLUCONAZOLE 200 MG/1
200 TABLET ORAL DAILY
Qty: 3 TABLET | Refills: 0 | Status: SHIPPED | OUTPATIENT
Start: 2023-05-16

## 2023-05-16 RX ORDER — HYDROCODONE BITARTRATE AND ACETAMINOPHEN 5; 325 MG/1; MG/1
1 TABLET ORAL EVERY 6 HOURS PRN
Qty: 100 TABLET | Refills: 0 | Status: SHIPPED | OUTPATIENT
Start: 2023-05-16 | End: 2023-06-15

## 2023-05-18 ENCOUNTER — TELEPHONE (OUTPATIENT)
Age: 33
End: 2023-05-18

## 2023-05-18 RX ORDER — PREDNISONE 20 MG/1
TABLET ORAL
Qty: 9 TABLET | Refills: 1 | OUTPATIENT
Start: 2023-05-18

## 2023-05-18 NOTE — TELEPHONE ENCOUNTER
Pt came in today to request that the order for her stress test [facility]

## 2023-06-05 ENCOUNTER — CLINICAL DOCUMENTATION (OUTPATIENT)
Facility: CLINIC | Age: 33
End: 2023-06-05

## 2023-06-05 NOTE — PROGRESS NOTES
Gwendolyn Palmadorian [id number]  Outside Information     US ABDOMEN LIMITED    Anatomical Region Laterality Modality   Abdomen -- Ultrasound     Impression    1. Hepatomegaly with increased echotexture, most commonly seen with steatosis. No mass. Findings are concordant with the prior CT dated 9/19/2020. Thank you for allowing us to assist in the care of this patient. Signed By: Jil Bee MD on 6/4/2023 10:43 PM  Narrative    PROCEDURE:  US Abdomen, Limited. HISTORY:  Patient is a 35year old Female with R79.89: Elevated LFTs. COMPARISON:  CT Abdomen/Pelvis 9/19/2020. TECHNIQUE: Two-dimensional grayscale ultrasound of the limited abdomen was performed. FINDINGS:   The pancreas is not well visualized due to overlying bowel gas. The visualized segments appear unremarkable. The liver is enlarged measuring 20.1 cm in length and demonstrates increased echotexture without intrahepatic biliary dilatation. Focal fatty sparing is visualized within the gallbladder fossa. No masses are identified. The main portal vein measures 0.73 cm and demonstrates normal, hepatopedal flow. The gallbladder demonstrates normal anechoic echotexture without pericholecystic fluid or wall thickening. There are no gallstones. The common bile duct measures 0.34 cm. Negative sonographic Almazan's sign. The right kidney measures 11.1 x 4.2 x 5.1 cm. Renal cortical echotexture is normal. There is no hydronephrosis. There are no stones. There are no cysts. Procedure Note    Rashmi Seth MD - 06/04/2023   Formatting of this note might be different from the original.   PROCEDURE:  US Abdomen, Limited. HISTORY:  Patient is a 35year old Female with R79.89: Elevated LFTs. COMPARISON:  CT Abdomen/Pelvis 9/19/2020. TECHNIQUE: Two-dimensional grayscale ultrasound of the limited abdomen was performed. FINDINGS:   The pancreas is not well visualized due to overlying bowel gas.  The visualized segments

## 2023-06-20 ENCOUNTER — TELEPHONE (OUTPATIENT)
Facility: CLINIC | Age: 33
End: 2023-06-20

## 2023-06-20 DIAGNOSIS — Z79.891 ENCOUNTER FOR LONG-TERM USE OF OPIATE ANALGESIC: ICD-10-CM

## 2023-06-20 DIAGNOSIS — M54.50 CHRONIC BILATERAL LOW BACK PAIN, UNSPECIFIED WHETHER SCIATICA PRESENT: ICD-10-CM

## 2023-06-20 DIAGNOSIS — G89.29 CHRONIC BILATERAL LOW BACK PAIN, UNSPECIFIED WHETHER SCIATICA PRESENT: ICD-10-CM

## 2023-06-20 RX ORDER — HYDROCODONE BITARTRATE AND ACETAMINOPHEN 5; 325 MG/1; MG/1
1 TABLET ORAL EVERY 6 HOURS PRN
Qty: 96 TABLET | Refills: 0 | Status: SHIPPED | OUTPATIENT
Start: 2023-06-20 | End: 2023-07-20

## 2023-06-21 NOTE — PROGRESS NOTES
Orders Placed This Encounter    HYDROcodone-acetaminophen (NORCO) 5-325 MG per tablet     Sig: Take 1 tablet by mouth every 6 hours as needed for Pain for up to 30 days.  Intended supply: 30 days Max Daily Amount: 4 tablets     Dispense:  96 tablet     Refill:  0     Reduce doses taken as pain becomes manageable       Reducing by 4 tabs

## 2023-07-11 RX ORDER — PREDNISONE 20 MG/1
TABLET ORAL
Qty: 9 TABLET | Refills: 1 | Status: SHIPPED | OUTPATIENT
Start: 2023-07-11

## 2023-07-11 RX ORDER — CARVEDILOL 6.25 MG/1
TABLET ORAL
Qty: 180 TABLET | Refills: 3 | Status: SHIPPED | OUTPATIENT
Start: 2023-07-11

## 2023-07-11 RX ORDER — EMPAGLIFLOZIN 25 MG/1
TABLET, FILM COATED ORAL
Qty: 90 TABLET | Refills: 3 | Status: SHIPPED | OUTPATIENT
Start: 2023-07-11

## 2023-07-11 RX ORDER — SACUBITRIL AND VALSARTAN 49; 51 MG/1; MG/1
TABLET, FILM COATED ORAL
Qty: 180 TABLET | Refills: 3 | Status: SHIPPED | OUTPATIENT
Start: 2023-07-11

## 2023-07-17 DIAGNOSIS — M54.50 CHRONIC BILATERAL LOW BACK PAIN, UNSPECIFIED WHETHER SCIATICA PRESENT: ICD-10-CM

## 2023-07-17 DIAGNOSIS — Z79.891 ENCOUNTER FOR LONG-TERM USE OF OPIATE ANALGESIC: ICD-10-CM

## 2023-07-17 DIAGNOSIS — G89.29 CHRONIC BILATERAL LOW BACK PAIN, UNSPECIFIED WHETHER SCIATICA PRESENT: ICD-10-CM

## 2023-07-17 RX ORDER — HYDROCODONE BITARTRATE AND ACETAMINOPHEN 5; 325 MG/1; MG/1
1 TABLET ORAL EVERY 6 HOURS PRN
Qty: 96 TABLET | Refills: 0 | Status: SHIPPED | OUTPATIENT
Start: 2023-07-19 | End: 2023-08-17 | Stop reason: SDUPTHER

## 2023-07-30 RX ORDER — FLUCONAZOLE 200 MG/1
TABLET ORAL
Qty: 3 TABLET | Refills: 0 | OUTPATIENT
Start: 2023-07-30

## 2023-07-31 ENCOUNTER — PATIENT MESSAGE (OUTPATIENT)
Facility: CLINIC | Age: 33
End: 2023-07-31

## 2023-08-01 ENCOUNTER — OFFICE VISIT (OUTPATIENT)
Facility: CLINIC | Age: 33
End: 2023-08-01
Payer: MEDICAID

## 2023-08-01 VITALS
SYSTOLIC BLOOD PRESSURE: 117 MMHG | TEMPERATURE: 97.1 F | BODY MASS INDEX: 44.53 KG/M2 | RESPIRATION RATE: 18 BRPM | WEIGHT: 242 LBS | DIASTOLIC BLOOD PRESSURE: 81 MMHG | HEART RATE: 78 BPM | HEIGHT: 62 IN | OXYGEN SATURATION: 95 %

## 2023-08-01 DIAGNOSIS — F41.9 ANXIETY AND DEPRESSION: Primary | ICD-10-CM

## 2023-08-01 DIAGNOSIS — F32.A ANXIETY AND DEPRESSION: Primary | ICD-10-CM

## 2023-08-01 DIAGNOSIS — N76.1 SUBACUTE VAGINITIS: ICD-10-CM

## 2023-08-01 PROCEDURE — 99214 OFFICE O/P EST MOD 30 MIN: CPT | Performed by: INTERNAL MEDICINE

## 2023-08-01 RX ORDER — BUPROPION HYDROCHLORIDE 150 MG/1
150 TABLET ORAL EVERY MORNING
Qty: 30 TABLET | Refills: 5 | Status: SHIPPED | OUTPATIENT
Start: 2023-08-01

## 2023-08-01 RX ORDER — FLUCONAZOLE 200 MG/1
200 TABLET ORAL DAILY
Qty: 3 TABLET | Refills: 0 | Status: SHIPPED | OUTPATIENT
Start: 2023-08-01

## 2023-08-01 RX ORDER — BUSPIRONE HYDROCHLORIDE 10 MG/1
10 TABLET ORAL 3 TIMES DAILY PRN
Qty: 45 TABLET | Refills: 1 | Status: SHIPPED | OUTPATIENT
Start: 2023-08-01

## 2023-08-01 ASSESSMENT — PATIENT HEALTH QUESTIONNAIRE - PHQ9
SUM OF ALL RESPONSES TO PHQ QUESTIONS 1-9: 14
7. TROUBLE CONCENTRATING ON THINGS, SUCH AS READING THE NEWSPAPER OR WATCHING TELEVISION: MORE THAN HALF THE DAYS
4. FEELING TIRED OR HAVING LITTLE ENERGY: NEARLY EVERY DAY
5. POOR APPETITE OR OVEREATING: 2
2. FEELING DOWN, DEPRESSED OR HOPELESS: SEVERAL DAYS
2. FEELING DOWN, DEPRESSED OR HOPELESS: 1
SUM OF ALL RESPONSES TO PHQ QUESTIONS 1-9: 14
1. LITTLE INTEREST OR PLEASURE IN DOING THINGS: 2
SUM OF ALL RESPONSES TO PHQ QUESTIONS 1-9: 14
SUM OF ALL RESPONSES TO PHQ QUESTIONS 1-9: 14
3. TROUBLE FALLING OR STAYING ASLEEP: NEARLY EVERY DAY
5. POOR APPETITE OR OVEREATING: MORE THAN HALF THE DAYS
4. FEELING TIRED OR HAVING LITTLE ENERGY: 3
7. TROUBLE CONCENTRATING ON THINGS, SUCH AS READING THE NEWSPAPER OR WATCHING TELEVISION: 2
10. IF YOU CHECKED OFF ANY PROBLEMS, HOW DIFFICULT HAVE THESE PROBLEMS MADE IT FOR YOU TO DO YOUR WORK, TAKE CARE OF THINGS AT HOME, OR GET ALONG WITH OTHER PEOPLE: SOMEWHAT DIFFICULT
10. IF YOU CHECKED OFF ANY PROBLEMS, HOW DIFFICULT HAVE THESE PROBLEMS MADE IT FOR YOU TO DO YOUR WORK, TAKE CARE OF THINGS AT HOME, OR GET ALONG WITH OTHER PEOPLE: 1
SUM OF ALL RESPONSES TO PHQ9 QUESTIONS 1 & 2: 3
SUM OF ALL RESPONSES TO PHQ9 QUESTIONS 1 & 2: 3
SUM OF ALL RESPONSES TO PHQ QUESTIONS 1-9: 14
8. MOVING OR SPEAKING SO SLOWLY THAT OTHER PEOPLE COULD HAVE NOTICED. OR THE OPPOSITE - BEING SO FIDGETY OR RESTLESS THAT YOU HAVE BEEN MOVING AROUND A LOT MORE THAN USUAL: NOT AT ALL
1. LITTLE INTEREST OR PLEASURE IN DOING THINGS: MORE THAN HALF THE DAYS
3. TROUBLE FALLING OR STAYING ASLEEP: 3
6. FEELING BAD ABOUT YOURSELF - OR THAT YOU ARE A FAILURE OR HAVE LET YOURSELF OR YOUR FAMILY DOWN: 1
6. FEELING BAD ABOUT YOURSELF - OR THAT YOU ARE A FAILURE OR HAVE LET YOURSELF OR YOUR FAMILY DOWN: SEVERAL DAYS
9. THOUGHTS THAT YOU WOULD BE BETTER OFF DEAD, OR OF HURTING YOURSELF: 0
8. MOVING OR SPEAKING SO SLOWLY THAT OTHER PEOPLE COULD HAVE NOTICED. OR THE OPPOSITE, BEING SO FIGETY OR RESTLESS THAT YOU HAVE BEEN MOVING AROUND A LOT MORE THAN USUAL: 0
9. THOUGHTS THAT YOU WOULD BE BETTER OFF DEAD, OR OF HURTING YOURSELF: NOT AT ALL

## 2023-08-01 NOTE — PROGRESS NOTES
1. \"Have you been to the ER, urgent care clinic since your last visit? Hospitalized since your last visit? \" No    2. \"Have you seen or consulted any other health care providers outside of the [address] [place] [address] since your last visit? \" No     3. For patients aged 43-73: Has the patient had a colonoscopy / FIT/ Cologuard? NA - based on age      If the patient is female:    4. For patients aged 43-66: Has the patient had a mammogram within the past 2 years? NA - based on age or sex      11. For patients aged 21-65: Has the patient had a pap smear?  Yes - no Care Gap present

## 2023-08-04 ENCOUNTER — OFFICE VISIT (OUTPATIENT)
Age: 33
End: 2023-08-04

## 2023-08-04 VITALS
OXYGEN SATURATION: 97 % | DIASTOLIC BLOOD PRESSURE: 84 MMHG | HEIGHT: 60 IN | HEART RATE: 76 BPM | BODY MASS INDEX: 48.29 KG/M2 | WEIGHT: 246 LBS | SYSTOLIC BLOOD PRESSURE: 130 MMHG

## 2023-08-04 DIAGNOSIS — I42.0 DILATED CARDIOMYOPATHY (HCC): Primary | ICD-10-CM

## 2023-08-04 ASSESSMENT — PATIENT HEALTH QUESTIONNAIRE - PHQ9
SUM OF ALL RESPONSES TO PHQ QUESTIONS 1-9: 0
5. POOR APPETITE OR OVEREATING: 0
1. LITTLE INTEREST OR PLEASURE IN DOING THINGS: 0
10. IF YOU CHECKED OFF ANY PROBLEMS, HOW DIFFICULT HAVE THESE PROBLEMS MADE IT FOR YOU TO DO YOUR WORK, TAKE CARE OF THINGS AT HOME, OR GET ALONG WITH OTHER PEOPLE: 0
2. FEELING DOWN, DEPRESSED OR HOPELESS: 0
4. FEELING TIRED OR HAVING LITTLE ENERGY: 0
SUM OF ALL RESPONSES TO PHQ9 QUESTIONS 1 & 2: 0
6. FEELING BAD ABOUT YOURSELF - OR THAT YOU ARE A FAILURE OR HAVE LET YOURSELF OR YOUR FAMILY DOWN: 0
SUM OF ALL RESPONSES TO PHQ QUESTIONS 1-9: 0
3. TROUBLE FALLING OR STAYING ASLEEP: 0
9. THOUGHTS THAT YOU WOULD BE BETTER OFF DEAD, OR OF HURTING YOURSELF: 0
7. TROUBLE CONCENTRATING ON THINGS, SUCH AS READING THE NEWSPAPER OR WATCHING TELEVISION: 0
8. MOVING OR SPEAKING SO SLOWLY THAT OTHER PEOPLE COULD HAVE NOTICED. OR THE OPPOSITE, BEING SO FIGETY OR RESTLESS THAT YOU HAVE BEEN MOVING AROUND A LOT MORE THAN USUAL: 0

## 2023-08-04 NOTE — PROGRESS NOTES
Ryan Chua presents today for   Chief Complaint   Patient presents with    Follow-up     yearly       Ryan Chua preferred language for health care discussion is english/other. Is someone accompanying this pt?  no    Is the patient using any DME equipment during OV? no    Depression Screening:  Depression: Not at risk    PHQ-2 Score: 0        Learning Assessment:  Who is the primary learner? Patient    What is the preferred language for health care of the primary learner? ENGLISH    How does the primary learner prefer to learn new concepts? DEMONSTRATION    Answered By patient    Relationship to Learner SELF           Pt currently taking Anticoagulant therapy? no    Pt currently taking Antiplatelet therapy ? Aspirin 81      Coordination of Care:  1. Have you been to the ER, urgent care clinic since your last visit? Hospitalized since your last visit? no    2. Have you seen or consulted any other health care providers outside of the [address] [place] [address] since your last visit? Include any pap smears or colon screening.  no

## 2023-08-17 DIAGNOSIS — Z79.891 ENCOUNTER FOR LONG-TERM USE OF OPIATE ANALGESIC: ICD-10-CM

## 2023-08-17 DIAGNOSIS — G89.29 CHRONIC BILATERAL LOW BACK PAIN, UNSPECIFIED WHETHER SCIATICA PRESENT: ICD-10-CM

## 2023-08-17 DIAGNOSIS — M54.50 CHRONIC BILATERAL LOW BACK PAIN, UNSPECIFIED WHETHER SCIATICA PRESENT: ICD-10-CM

## 2023-08-17 RX ORDER — HYDROCODONE BITARTRATE AND ACETAMINOPHEN 5; 325 MG/1; MG/1
1 TABLET ORAL EVERY 6 HOURS PRN
Qty: 96 TABLET | Refills: 0 | Status: SHIPPED | OUTPATIENT
Start: 2023-08-17 | End: 2023-09-16

## 2023-08-29 ENCOUNTER — TELEMEDICINE (OUTPATIENT)
Facility: CLINIC | Age: 33
End: 2023-08-29
Payer: MEDICAID

## 2023-08-29 DIAGNOSIS — B02.9 HERPES ZOSTER WITHOUT COMPLICATION: ICD-10-CM

## 2023-08-29 DIAGNOSIS — F41.9 ANXIETY: ICD-10-CM

## 2023-08-29 DIAGNOSIS — F32.A DEPRESSION, UNSPECIFIED DEPRESSION TYPE: Primary | ICD-10-CM

## 2023-08-29 DIAGNOSIS — E11.9 CONTROLLED TYPE 2 DIABETES MELLITUS WITHOUT COMPLICATION, WITHOUT LONG-TERM CURRENT USE OF INSULIN (HCC): ICD-10-CM

## 2023-08-29 DIAGNOSIS — Z76.0 MEDICATION REFILL: ICD-10-CM

## 2023-08-29 PROCEDURE — 99214 OFFICE O/P EST MOD 30 MIN: CPT | Performed by: INTERNAL MEDICINE

## 2023-08-29 RX ORDER — FLUCONAZOLE 200 MG/1
200 TABLET ORAL DAILY
Qty: 3 TABLET | Refills: 1 | Status: SHIPPED | OUTPATIENT
Start: 2023-08-29

## 2023-08-29 RX ORDER — CLINDAMYCIN HYDROCHLORIDE 300 MG/1
CAPSULE ORAL
COMMUNITY
Start: 2023-08-24

## 2023-08-29 RX ORDER — GABAPENTIN 300 MG/1
300 CAPSULE ORAL 3 TIMES DAILY
Qty: 90 CAPSULE | Refills: 0 | Status: SHIPPED | OUTPATIENT
Start: 2023-08-29 | End: 2023-09-28

## 2023-08-29 NOTE — PROGRESS NOTES
Pauly Rogel (:  1990) is a Established patient, presenting virtually for evaluation of the following:    Assessment & Plan   Below is the assessment and plan developed based on review of pertinent history, physical exam, labs, studies, and medications. 1. Depression, unspecified depression type  2. Anxiety  3. Herpes zoster without complication  -     gabapentin (NEURONTIN) 300 MG capsule; Take 1 capsule by mouth 3 times daily for 30 days. Intended supply: 30 days Max Daily Amount: 900 mg, Disp-90 capsule, R-0Normal  4. Controlled type 2 diabetes mellitus without complication, without long-term current use of insulin ([address])  5. Medication refill  -     fluconazole (DIFLUCAN) 200 MG tablet; Take 1 tablet by mouth daily, Disp-3 tablet, R-1Normal    Her mood is improved and she sounded well today. Will continue the same meds and doses. Reviewed both ER notes. At [facility], they prescribed a steroid and valtrex but nothing for pain  I have added gabapentin  Discussed implications of shingles with respect to immune compromised persons  Discussed usual course of the breakout. Plan f/u in office about 6 weeks for the depression and f/u on blood sugar       Subjective   Here to r/u on her anxiety and depression. She reports the wellbutrin and buspirone are doing well. Would like  to continue the same meds            She was having some left arm pain shooting up from the elbow into her neck and chest. She was seen at [facility] ER and they did not find anything of note    Today she broke out in a rash    Anxiety  Presents for follow-up visit. Patient reports no nervous/anxious behavior. Depression  Visit Type: follow-up  Patient is not experiencing: nervousness/anxiety. Diabetes  She presents for her follow-up diabetic visit. She has type 2 diabetes mellitus. Pertinent negatives for hypoglycemia include no nervousness/anxiousness. Review of Systems   Constitutional: Negative.     Skin:

## 2023-08-29 NOTE — PROGRESS NOTES
1. \"Have you been to the ER, urgent care clinic since your last visit? Hospitalized since your last visit? \" Yes [address] [facility] 8/2023    2. \"Have you seen or consulted any other health care providers outside of the [address] [place] [address] since your last visit? \" No     3. For patients aged 43-73: Has the patient had a colonoscopy / FIT/ Cologuard? NA - based on age      If the patient is female:    4. For patients aged 43-66: Has the patient had a mammogram within the past 2 years? NA - based on age or sex      11. For patients aged 21-65: Has the patient had a pap smear?  Yes - no Care Gap present

## 2023-09-03 DIAGNOSIS — F41.9 ANXIETY AND DEPRESSION: ICD-10-CM

## 2023-09-03 DIAGNOSIS — F32.A ANXIETY AND DEPRESSION: ICD-10-CM

## 2023-09-03 RX ORDER — DEXAMETHASONE 4 MG/1
TABLET ORAL
Qty: 12 G | Refills: 3 | Status: SHIPPED | OUTPATIENT
Start: 2023-09-03

## 2023-09-03 RX ORDER — BUSPIRONE HYDROCHLORIDE 10 MG/1
TABLET ORAL
Qty: 45 TABLET | Refills: 1 | Status: SHIPPED | OUTPATIENT
Start: 2023-09-03

## 2023-09-08 RX ORDER — FAMOTIDINE 20 MG/1
TABLET, FILM COATED ORAL
Qty: 90 TABLET | Refills: 1 | Status: SHIPPED | OUTPATIENT
Start: 2023-09-08

## 2023-09-12 ENCOUNTER — OFFICE VISIT (OUTPATIENT)
Facility: CLINIC | Age: 33
End: 2023-09-12
Payer: MEDICAID

## 2023-09-12 VITALS
RESPIRATION RATE: 18 BRPM | BODY MASS INDEX: 45.5 KG/M2 | OXYGEN SATURATION: 98 % | HEIGHT: 61 IN | HEART RATE: 89 BPM | WEIGHT: 241 LBS | TEMPERATURE: 97 F | SYSTOLIC BLOOD PRESSURE: 146 MMHG | DIASTOLIC BLOOD PRESSURE: 92 MMHG

## 2023-09-12 DIAGNOSIS — M79.602 PAIN OF LEFT UPPER EXTREMITY: Primary | ICD-10-CM

## 2023-09-12 DIAGNOSIS — G56.22 ULNAR NEUROPATHY AT ELBOW OF LEFT UPPER EXTREMITY: ICD-10-CM

## 2023-09-12 PROCEDURE — 99213 OFFICE O/P EST LOW 20 MIN: CPT | Performed by: INTERNAL MEDICINE

## 2023-09-12 RX ORDER — PREGABALIN 75 MG/1
75 CAPSULE ORAL 2 TIMES DAILY
Qty: 60 CAPSULE | Refills: 1 | Status: SHIPPED | OUTPATIENT
Start: 2023-09-12 | End: 2023-11-11

## 2023-09-12 RX ORDER — METHYLPREDNISOLONE 4 MG/1
TABLET ORAL
Qty: 1 KIT | Refills: 1 | Status: SHIPPED | OUTPATIENT
Start: 2023-09-12

## 2023-09-12 ASSESSMENT — PATIENT HEALTH QUESTIONNAIRE - PHQ9: DEPRESSION UNABLE TO ASSESS: URGENT/EMERGENT SITUATION

## 2023-09-12 NOTE — PROGRESS NOTES
1. \"Have you been to the ER, urgent care clinic since your last visit? Hospitalized since your last visit? \" Yes [facility] 9/11/23 for pain    2. \"Have you seen or consulted any other health care providers outside of the [address] [place] [address] since your last visit? \" No     3. For patients aged 43-73: Has the patient had a colonoscopy / FIT/ Cologuard? NA - based on age      If the patient is female:    4. For patients aged 43-66: Has the patient had a mammogram within the past 2 years? NA - based on age or sex      11. For patients aged 21-65: Has the patient had a pap smear?  Yes - no Care Gap present

## 2023-09-15 DIAGNOSIS — G89.29 CHRONIC BILATERAL LOW BACK PAIN, UNSPECIFIED WHETHER SCIATICA PRESENT: ICD-10-CM

## 2023-09-15 DIAGNOSIS — Z79.891 ENCOUNTER FOR LONG-TERM USE OF OPIATE ANALGESIC: ICD-10-CM

## 2023-09-15 DIAGNOSIS — M54.50 CHRONIC BILATERAL LOW BACK PAIN, UNSPECIFIED WHETHER SCIATICA PRESENT: ICD-10-CM

## 2023-09-15 RX ORDER — HYDROCODONE BITARTRATE AND ACETAMINOPHEN 5; 325 MG/1; MG/1
1 TABLET ORAL EVERY 6 HOURS PRN
Qty: 96 TABLET | Refills: 0 | Status: SHIPPED | OUTPATIENT
Start: 2023-09-15 | End: 2023-10-15

## 2023-09-26 RX ORDER — ATORVASTATIN CALCIUM 40 MG/1
40 TABLET, FILM COATED ORAL
Qty: 90 TABLET | Refills: 1 | Status: SHIPPED | OUTPATIENT
Start: 2023-09-26

## 2023-09-30 DIAGNOSIS — Z76.0 MEDICATION REFILL: ICD-10-CM

## 2023-10-03 RX ORDER — FLUCONAZOLE 200 MG/1
200 TABLET ORAL DAILY
Qty: 3 TABLET | Refills: 1 | Status: SHIPPED | OUTPATIENT
Start: 2023-10-03

## 2023-10-07 DIAGNOSIS — F41.9 ANXIETY AND DEPRESSION: ICD-10-CM

## 2023-10-07 DIAGNOSIS — F32.A ANXIETY AND DEPRESSION: ICD-10-CM

## 2023-10-08 RX ORDER — BUSPIRONE HYDROCHLORIDE 10 MG/1
TABLET ORAL
Qty: 45 TABLET | Refills: 1 | Status: SHIPPED | OUTPATIENT
Start: 2023-10-08

## 2023-10-10 RX ORDER — ALBUTEROL SULFATE 90 UG/1
AEROSOL, METERED RESPIRATORY (INHALATION)
Qty: 18 G | Refills: 5 | Status: SHIPPED | OUTPATIENT
Start: 2023-10-10

## 2023-10-12 ENCOUNTER — OFFICE VISIT (OUTPATIENT)
Facility: CLINIC | Age: 33
End: 2023-10-12
Payer: MEDICAID

## 2023-10-12 VITALS
DIASTOLIC BLOOD PRESSURE: 87 MMHG | SYSTOLIC BLOOD PRESSURE: 138 MMHG | HEIGHT: 61 IN | TEMPERATURE: 97.6 F | RESPIRATION RATE: 18 BRPM | WEIGHT: 244.8 LBS | HEART RATE: 103 BPM | BODY MASS INDEX: 46.22 KG/M2 | OXYGEN SATURATION: 96 %

## 2023-10-12 DIAGNOSIS — B02.9 HERPES ZOSTER WITHOUT COMPLICATION: Primary | ICD-10-CM

## 2023-10-12 DIAGNOSIS — R19.5 LOOSE STOOLS: ICD-10-CM

## 2023-10-12 DIAGNOSIS — R09.81 NASAL CONGESTION: ICD-10-CM

## 2023-10-12 DIAGNOSIS — J06.9 UPPER RESPIRATORY TRACT INFECTION, UNSPECIFIED TYPE: ICD-10-CM

## 2023-10-12 DIAGNOSIS — J45.20 MILD INTERMITTENT ASTHMA WITHOUT COMPLICATION: ICD-10-CM

## 2023-10-12 DIAGNOSIS — E11.9 CONTROLLED TYPE 2 DIABETES MELLITUS WITHOUT COMPLICATION, WITHOUT LONG-TERM CURRENT USE OF INSULIN (HCC): ICD-10-CM

## 2023-10-12 DIAGNOSIS — L29.9 ITCHING: ICD-10-CM

## 2023-10-12 PROCEDURE — 99214 OFFICE O/P EST MOD 30 MIN: CPT | Performed by: INTERNAL MEDICINE

## 2023-10-12 RX ORDER — PREDNISONE 10 MG/1
TABLET ORAL
Qty: 9 TABLET | Refills: 2 | Status: SHIPPED | OUTPATIENT
Start: 2023-10-12

## 2023-10-12 ASSESSMENT — ENCOUNTER SYMPTOMS
DIARRHEA: 1
SHORTNESS OF BREATH: 1
RHINORRHEA: 1
SINUS PRESSURE: 1
WHEEZING: 1

## 2023-10-12 NOTE — PROGRESS NOTES
ROOM # 6    Health Maintenance reviewed and discussed per provider  Please order/place referral if appropriate. 1. For patients aged 43-73: Has the patient had a colonoscopy? NA-AGE    If the patient is female:    2. For patients aged 43-66: Has the patient had a mammogram within the past 2 years? NA-AGE    3. For patients aged 21-65: Has the patient had a pap smear? DUE 08/2027    Advance Directive:  1. Do you have an advance directive in place? Patient Reply: NOT ASKED    2. If not, would you like material regarding how to put one in place? NOT ASKED    Coordination of Care:  1. Have you been to the ER, urgent care clinic since your last visit? Hospitalized since your last visit? NO    2. Have you seen or consulted any other health care providers outside of the [address] [place] [address] since your last visit? Include any pap smears or colon screening. NO    Patient is accompanied by HERSELF I have received verbal consent from [facility] to discuss any/all medical information while they are present in the room. Learning Assessment:  No flowsheet data found. Depression Screening:       No data to display              Abuse Screening:       No data to display              Fall Risk  No flowsheet data found.   Recent Travel Screening and Travel History documentation     Travel Screening     No screening recorded since 10/11/23 0000       Travel History   Travel since 09/12/23    No documented travel since 09/12/23

## 2023-10-13 LAB
ALBUMIN SERPL-MCNC: 4 G/DL (ref 3.9–4.9)
ALBUMIN/CREAT UR: 834 MG/G CREAT (ref 0–29)
ALBUMIN/GLOB SERPL: 0.9 {RATIO} (ref 1.2–2.2)
ALP SERPL-CCNC: 131 IU/L (ref 44–121)
ALT SERPL-CCNC: 56 IU/L (ref 0–32)
AST SERPL-CCNC: 50 IU/L (ref 0–40)
BILIRUB SERPL-MCNC: 0.4 MG/DL (ref 0–1.2)
BUN SERPL-MCNC: 7 MG/DL (ref 6–20)
BUN/CREAT SERPL: 8 (ref 9–23)
CALCIUM SERPL-MCNC: 9.1 MG/DL (ref 8.7–10.2)
CHLORIDE SERPL-SCNC: 101 MMOL/L (ref 96–106)
CHOLEST SERPL-MCNC: 189 MG/DL (ref 100–199)
CO2 SERPL-SCNC: 24 MMOL/L (ref 20–29)
CREAT SERPL-MCNC: 0.93 MG/DL (ref 0.57–1)
CREAT UR-MCNC: 76.5 MG/DL
EGFRCR SERPLBLD CKD-EPI 2021: 83 ML/MIN/1.73
GLOBULIN SER CALC-MCNC: 4.7 G/DL (ref 1.5–4.5)
GLUCOSE SERPL-MCNC: 163 MG/DL (ref 70–99)
HBA1C MFR BLD: 8.1 % (ref 4.8–5.6)
HDLC SERPL-MCNC: 41 MG/DL
LDLC SERPL CALC-MCNC: 122 MG/DL (ref 0–99)
MICROALBUMIN UR-MCNC: 638 UG/ML
POTASSIUM SERPL-SCNC: 3.7 MMOL/L (ref 3.5–5.2)
PROT SERPL-MCNC: 8.7 G/DL (ref 6–8.5)
SODIUM SERPL-SCNC: 137 MMOL/L (ref 134–144)
TRIGL SERPL-MCNC: 144 MG/DL (ref 0–149)
VLDLC SERPL CALC-MCNC: 26 MG/DL (ref 5–40)

## 2023-10-16 ENCOUNTER — TELEPHONE (OUTPATIENT)
Facility: CLINIC | Age: 33
End: 2023-10-16

## 2023-10-16 DIAGNOSIS — G89.29 CHRONIC BILATERAL LOW BACK PAIN, UNSPECIFIED WHETHER SCIATICA PRESENT: ICD-10-CM

## 2023-10-16 DIAGNOSIS — Z79.891 ENCOUNTER FOR LONG-TERM USE OF OPIATE ANALGESIC: ICD-10-CM

## 2023-10-16 DIAGNOSIS — M54.50 CHRONIC BILATERAL LOW BACK PAIN, UNSPECIFIED WHETHER SCIATICA PRESENT: ICD-10-CM

## 2023-10-16 RX ORDER — FLUTICASONE PROPIONATE 110 UG/1
AEROSOL, METERED RESPIRATORY (INHALATION)
Qty: 3 EACH | Refills: 3 | Status: SHIPPED | OUTPATIENT
Start: 2023-10-16

## 2023-10-16 RX ORDER — HYDROCODONE BITARTRATE AND ACETAMINOPHEN 5; 325 MG/1; MG/1
1 TABLET ORAL EVERY 6 HOURS PRN
Qty: 90 TABLET | Refills: 0 | Status: SHIPPED | OUTPATIENT
Start: 2023-10-16 | End: 2023-11-15

## 2023-10-16 NOTE — TELEPHONE ENCOUNTER
Pt requesting refill. Last ov 10/12, next 2/1/23. HYDROcodone-acetaminophen (NORCO) 5-325 MG per tablet [[id number]]  ENDED    Order Details  Dose: 1 tablet Route: Oral Frequency: EVERY 6 HOURS PRN for Pain   Dispense Quantity: 96 tablet Refills: 0    Note to Pharmacy: Reduce doses taken as pain becomes manageable Reduce doses taken as pain becomes manageable Reduce doses taken as pain becomes manageable Reduce doses taken as pain becomes manageable         Sig: Take 1 tablet by mouth every 6 hours as needed for Pain for up to 30 days.  Intended supply: 30 days Max Daily Amount: 4 tablets

## 2023-10-16 NOTE — TELEPHONE ENCOUNTER
Orders Placed This Encounter    fluticasone (FLOVENT HFA) 110 MCG/ACT inhaler     Sig: inhale 1 puff by mouth every 12 hours and INTO THE LUNGS     Dispense:  3 each     Refill:  3    HYDROcodone-acetaminophen (NORCO) 5-325 MG per tablet     Sig: Take 1 tablet by mouth every 6 hours as needed for Pain for up to 30 days. Intended supply: 30 days Max Daily Amount: 4 tablets     Dispense:  90 tablet     Refill:  0     Reduce doses taken as pain becomes manageable     Tapering norco further.  #90 sent instead of 96

## 2023-11-15 DIAGNOSIS — G89.29 CHRONIC BILATERAL LOW BACK PAIN, UNSPECIFIED WHETHER SCIATICA PRESENT: ICD-10-CM

## 2023-11-15 DIAGNOSIS — Z79.891 ENCOUNTER FOR LONG-TERM USE OF OPIATE ANALGESIC: ICD-10-CM

## 2023-11-15 DIAGNOSIS — M54.50 CHRONIC BILATERAL LOW BACK PAIN, UNSPECIFIED WHETHER SCIATICA PRESENT: ICD-10-CM

## 2023-11-15 RX ORDER — HYDROCODONE BITARTRATE AND ACETAMINOPHEN 5; 325 MG/1; MG/1
1 TABLET ORAL EVERY 6 HOURS PRN
Qty: 90 TABLET | Refills: 0 | Status: SHIPPED | OUTPATIENT
Start: 2023-11-15 | End: 2023-12-15

## 2023-12-07 RX ORDER — ALBUTEROL SULFATE 2.5 MG/3ML
2.5 SOLUTION RESPIRATORY (INHALATION) 3 TIMES DAILY PRN
Qty: 120 EACH | Refills: 5 | Status: SHIPPED | OUTPATIENT
Start: 2023-12-07

## 2023-12-07 NOTE — TELEPHONE ENCOUNTER
Last Appointment:  Visit date not found  Future Appointments   Date Time Provider [address]   2/1/2024  2:00 PM Raven Eli MD [facility] AMB   2/5/2024  8:00 AM Sasha Ho MD [facility] BS AMB

## 2023-12-12 ENCOUNTER — PATIENT MESSAGE (OUTPATIENT)
Facility: CLINIC | Age: 33
End: 2023-12-12

## 2023-12-12 NOTE — TELEPHONE ENCOUNTER
Patient is calling back, advised to be seen at an Urgent care or the Health Dept as Dr. Matthew Garcia does not currently have any available appt.

## 2023-12-15 DIAGNOSIS — Z79.891 ENCOUNTER FOR LONG-TERM USE OF OPIATE ANALGESIC: ICD-10-CM

## 2023-12-15 DIAGNOSIS — G89.29 CHRONIC BILATERAL LOW BACK PAIN, UNSPECIFIED WHETHER SCIATICA PRESENT: ICD-10-CM

## 2023-12-15 DIAGNOSIS — M54.50 CHRONIC BILATERAL LOW BACK PAIN, UNSPECIFIED WHETHER SCIATICA PRESENT: ICD-10-CM

## 2023-12-15 RX ORDER — HYDROCODONE BITARTRATE AND ACETAMINOPHEN 5; 325 MG/1; MG/1
1 TABLET ORAL EVERY 6 HOURS PRN
Qty: 90 TABLET | Refills: 0 | Status: SHIPPED | OUTPATIENT
Start: 2023-12-15 | End: 2024-01-14

## 2024-01-01 DIAGNOSIS — J45.20 MILD INTERMITTENT ASTHMA WITHOUT COMPLICATION: ICD-10-CM

## 2024-01-01 RX ORDER — PREDNISONE 10 MG/1
TABLET ORAL
Qty: 9 TABLET | Refills: 2 | Status: SHIPPED | OUTPATIENT
Start: 2024-01-01

## 2024-01-01 NOTE — TELEPHONE ENCOUNTER
I will order the test   (but she also did not have any of the med she is supposed to be taking in her system) unknown

## 2024-01-03 ENCOUNTER — TELEPHONE (OUTPATIENT)
Facility: CLINIC | Age: 34
End: 2024-01-03

## 2024-01-03 NOTE — TELEPHONE ENCOUNTER
Pharmacy faxed refill request for the following medication.      fluconazole (DIFLUCAN) 200 MG tablet [[id number]]    Order Details  Dose: 200 mg Route: Oral Frequency: DAILY   Dispense Quantity: 3 tablet Refills: 1          Sig: Take 1 tablet by mouth daily       Last Appointment:  10/12/2023  Future Appointments   Date Time Provider Department Center   2/1/2024  2:00 PM Margy Ruth MD [facility] AMB   2/5/2024  8:00 AM Kamran Porter MD [facility] BS AMB

## 2024-01-11 DIAGNOSIS — Z76.0 MEDICATION REFILL: ICD-10-CM

## 2024-01-11 RX ORDER — FLUCONAZOLE 200 MG/1
200 TABLET ORAL DAILY
Qty: 3 TABLET | Refills: 1 | Status: SHIPPED | OUTPATIENT
Start: 2024-01-11 | End: 2024-01-11

## 2024-01-15 DIAGNOSIS — Z79.891 ENCOUNTER FOR LONG-TERM USE OF OPIATE ANALGESIC: ICD-10-CM

## 2024-01-15 DIAGNOSIS — G89.29 CHRONIC BILATERAL LOW BACK PAIN, UNSPECIFIED WHETHER SCIATICA PRESENT: ICD-10-CM

## 2024-01-15 DIAGNOSIS — M54.50 CHRONIC BILATERAL LOW BACK PAIN, UNSPECIFIED WHETHER SCIATICA PRESENT: ICD-10-CM

## 2024-01-15 RX ORDER — HYDROCODONE BITARTRATE AND ACETAMINOPHEN 5; 325 MG/1; MG/1
1 TABLET ORAL EVERY 6 HOURS PRN
Qty: 90 TABLET | Refills: 0 | Status: SHIPPED | OUTPATIENT
Start: 2024-01-15 | End: 2024-02-14

## 2024-01-15 NOTE — TELEPHONE ENCOUNTER
Pt requesting refill to be sent to   [id number] - [place], [place] - [address] -  [phone] - F [phone]     Last Appointment:  10/12/2023  Future Appointments   Date Time Provider Department Center   2/1/2024  2:00 PM Margy Ruth MD [facility] AMB   2/5/2024  8:00 AM Kamran Porter MD [facility] BS AMB      HYDROcodone-acetaminophen (NORCO) 5-325 MG per tablet [[id number]]  DISCONTINUED    Order Details  Dose: 1 tablet Route: Oral Frequency: EVERY 6 HOURS PRN for Pain   Dispense Quantity: 90 tablet Refills: 0    Note to Pharmacy: Reduce doses taken as pain becomes manageable         Sig: Take 1 tablet by mouth every 6 hours as needed for Pain for up to 30 days. Intended supply: 30 days Max Daily Amount: 4 tablets

## 2024-01-24 DIAGNOSIS — F32.A ANXIETY AND DEPRESSION: ICD-10-CM

## 2024-01-24 DIAGNOSIS — F41.9 ANXIETY AND DEPRESSION: ICD-10-CM

## 2024-01-24 RX ORDER — BUPROPION HYDROCHLORIDE 150 MG/1
150 TABLET ORAL EVERY MORNING
Qty: 30 TABLET | Refills: 5 | Status: SHIPPED | OUTPATIENT
Start: 2024-01-24

## 2024-01-31 RX ORDER — FLUTICASONE PROPIONATE 50 MCG
SPRAY, SUSPENSION (ML) NASAL
Qty: 16 G | Refills: 5 | Status: SHIPPED | OUTPATIENT
Start: 2024-01-31

## 2024-02-01 ENCOUNTER — OFFICE VISIT (OUTPATIENT)
Facility: CLINIC | Age: 34
End: 2024-02-01
Payer: MEDICAID

## 2024-02-01 VITALS
BODY MASS INDEX: 44.89 KG/M2 | TEMPERATURE: 96.9 F | DIASTOLIC BLOOD PRESSURE: 97 MMHG | RESPIRATION RATE: 18 BRPM | HEIGHT: 61 IN | HEART RATE: 85 BPM | SYSTOLIC BLOOD PRESSURE: 147 MMHG | OXYGEN SATURATION: 97 % | WEIGHT: 237.8 LBS

## 2024-02-01 DIAGNOSIS — E11.9 CONTROLLED TYPE 2 DIABETES MELLITUS WITHOUT COMPLICATION, WITHOUT LONG-TERM CURRENT USE OF INSULIN (HCC): Primary | ICD-10-CM

## 2024-02-01 DIAGNOSIS — G43.809 OTHER MIGRAINE WITHOUT STATUS MIGRAINOSUS, NOT INTRACTABLE: ICD-10-CM

## 2024-02-01 DIAGNOSIS — Z76.0 MEDICATION REFILL: ICD-10-CM

## 2024-02-01 DIAGNOSIS — J34.89 SINUS PAIN: ICD-10-CM

## 2024-02-01 DIAGNOSIS — G89.29 CHRONIC BILATERAL LOW BACK PAIN, UNSPECIFIED WHETHER SCIATICA PRESENT: ICD-10-CM

## 2024-02-01 DIAGNOSIS — Z79.891 ENCOUNTER FOR LONG-TERM USE OF OPIATE ANALGESIC: ICD-10-CM

## 2024-02-01 DIAGNOSIS — M54.50 CHRONIC BILATERAL LOW BACK PAIN, UNSPECIFIED WHETHER SCIATICA PRESENT: ICD-10-CM

## 2024-02-01 LAB — HBA1C MFR BLD: 7.9 %

## 2024-02-01 PROCEDURE — 99214 OFFICE O/P EST MOD 30 MIN: CPT | Performed by: INTERNAL MEDICINE

## 2024-02-01 PROCEDURE — 83036 HEMOGLOBIN GLYCOSYLATED A1C: CPT | Performed by: INTERNAL MEDICINE

## 2024-02-01 RX ORDER — HYDROCODONE BITARTRATE AND ACETAMINOPHEN 5; 325 MG/1; MG/1
1 TABLET ORAL EVERY 6 HOURS PRN
Qty: 90 TABLET | Refills: 0 | Status: SHIPPED | OUTPATIENT
Start: 2024-02-14 | End: 2024-03-15

## 2024-02-01 RX ORDER — FLUCONAZOLE 200 MG/1
200 TABLET ORAL ONCE
Qty: 1 TABLET | Refills: 1 | Status: SHIPPED | OUTPATIENT
Start: 2024-02-01 | End: 2024-02-01

## 2024-02-01 RX ORDER — KETOROLAC TROMETHAMINE 30 MG/ML
30 INJECTION, SOLUTION INTRAMUSCULAR; INTRAVENOUS ONCE
Status: COMPLETED | OUTPATIENT
Start: 2024-02-01 | End: 2024-02-01

## 2024-02-01 RX ADMIN — KETOROLAC TROMETHAMINE 30 MG: 30 INJECTION, SOLUTION INTRAMUSCULAR; INTRAVENOUS at 15:54

## 2024-02-01 ASSESSMENT — PATIENT HEALTH QUESTIONNAIRE - PHQ9
1. LITTLE INTEREST OR PLEASURE IN DOING THINGS: 0
3. TROUBLE FALLING OR STAYING ASLEEP: 3
SUM OF ALL RESPONSES TO PHQ QUESTIONS 1-9: 3
SUM OF ALL RESPONSES TO PHQ QUESTIONS 1-9: 3
4. FEELING TIRED OR HAVING LITTLE ENERGY: 0
SUM OF ALL RESPONSES TO PHQ QUESTIONS 1-9: 3
8. MOVING OR SPEAKING SO SLOWLY THAT OTHER PEOPLE COULD HAVE NOTICED. OR THE OPPOSITE, BEING SO FIGETY OR RESTLESS THAT YOU HAVE BEEN MOVING AROUND A LOT MORE THAN USUAL: 0
9. THOUGHTS THAT YOU WOULD BE BETTER OFF DEAD, OR OF HURTING YOURSELF: 0
7. TROUBLE CONCENTRATING ON THINGS, SUCH AS READING THE NEWSPAPER OR WATCHING TELEVISION: 0
5. POOR APPETITE OR OVEREATING: 0
10. IF YOU CHECKED OFF ANY PROBLEMS, HOW DIFFICULT HAVE THESE PROBLEMS MADE IT FOR YOU TO DO YOUR WORK, TAKE CARE OF THINGS AT HOME, OR GET ALONG WITH OTHER PEOPLE: 0
SUM OF ALL RESPONSES TO PHQ9 QUESTIONS 1 & 2: 0
6. FEELING BAD ABOUT YOURSELF - OR THAT YOU ARE A FAILURE OR HAVE LET YOURSELF OR YOUR FAMILY DOWN: 0
2. FEELING DOWN, DEPRESSED OR HOPELESS: 0
SUM OF ALL RESPONSES TO PHQ QUESTIONS 1-9: 3

## 2024-02-01 ASSESSMENT — ENCOUNTER SYMPTOMS
RHINORRHEA: 1
SINUS PRESSURE: 1
SINUS PAIN: 1

## 2024-02-01 NOTE — PROGRESS NOTES
\"Have you been to the ER, urgent care clinic since your last visit?  Hospitalized since your last visit?\"    YES - When: approximately 4  weeks ago.  Where and Why: [facility], [facility].    “Have you seen or consulted any other health care providers outside of [facility] since your last visit?”    NO         After obtaining consent, and per orders of Dr. Ruth, injection of TorADOL 30mg/1mL given in left deltoid by Roxie Guardado LPN. Patient instructed to remain in clinic for 20 minutes afterwards, and to report any adverse reaction to me immediately.   
Mental Status: She is alert and oriented to person, place, and time.         ASSESSMENT and PLAN      ICD-10-CM    1. Controlled type 2 diabetes mellitus without complication, without long-term current use of insulin (HCC)  E11.9 AMB POC HEMOGLOBIN A1C      2. Other migraine without status migrainosus, not intractable  G43.809 ketorolac (TORADOL) injection 30 mg      3. Sinus pain  J34.89 ketorolac (TORADOL) injection 30 mg      4. Chronic bilateral low back pain, unspecified whether sciatica present  M54.50 HYDROcodone-acetaminophen (NORCO) 5-325 MG per tablet    G89.29       5. Medication refill  Z76.0 fluconazole (DIFLUCAN) 200 MG tablet     HYDROcodone-acetaminophen (NORCO) 5-325 MG per tablet      6. Encounter for long-term use of opiate analgesic  Z79.891 HYDROcodone-acetaminophen (NORCO) 5-325 MG per tablet           Reviewed last 3 ER notes. COVID, facial swelling, and thrush visits  There has been a lot of lab done  Will only check HBA1c today--it is 7.9 which is a little improvement    Sinus congestion and pain aggravated by smoke. She is already on steroid nasal spray.    Will give toradol for her headache (she only had pedal edema from naproxen, Can take motrin without a problem)    F/u 2 months for recheck sinus, DM,  chronic pain, med refills

## 2024-02-06 DIAGNOSIS — Z76.0 MEDICATION REFILL: ICD-10-CM

## 2024-02-06 NOTE — TELEPHONE ENCOUNTER
Pt called in stating that the prescription she just got for fluconazole (DIFLUCAN) 200 MG tablet is supposed to be for 3 pills a day? It only says to take one. She's wondering if that is correct?

## 2024-02-07 RX ORDER — FLUCONAZOLE 200 MG/1
200 TABLET ORAL DAILY
Qty: 3 TABLET | Refills: 1 | Status: SHIPPED | OUTPATIENT
Start: 2024-02-07

## 2024-02-07 NOTE — TELEPHONE ENCOUNTER
Spoke with pt in regards to medication question. Two patient identifier's verified.  Relayed the PCP's note. Pt states the pharmacy only dispense one pill. Chart reviewed and confirmed. PCP notified.

## 2024-02-15 ENCOUNTER — CARE COORDINATION (OUTPATIENT)
Facility: CLINIC | Age: 34
End: 2024-02-15

## 2024-02-28 ENCOUNTER — CARE COORDINATION (OUTPATIENT)
Facility: CLINIC | Age: 34
End: 2024-02-28

## 2024-02-28 NOTE — CARE COORDINATION
Ambulatory Care Coordination Note  2024    Patient Current Location:  Home: [address]  [facility] [address]    ACM contacted the patient by telephone. Verified name and  with patient as identifiers. Provided introduction to self, and explanation of the ACM role.     ACM: Albania Alford RN    Challenges to be reviewed by the provider   Additional needs identified to be addressed with provider: No  none               Method of communication with provider: staff message.    ACM reviewed patient chart and then called patient for routine follow up. She is doing well. She has all meds and is taking as directed. No concerns. Patient is eating and drinking well. She strives to eat healthy. Patient is moving about. No care needs noted.     Offered patient enrollment in the Remote Patient Monitoring (RPM) program for in-home monitoring: Patient declined.    Ambulatory Care Coordination Assessment    Care Coordination Protocol  Referral from Primary Care Provider: No  Week 1 - Initial Assessment     Do you have all of your prescriptions and are they filled?: Yes  Barriers to medication adherence: None  Are you able to afford your medications?: Yes  How often do you have trouble taking your medications the way you have been told to take them?: I always take them as prescribed.        Ability to seek help/take action for Emergent Urgent situations i.e. fire, crime, inclement weather or health crisis.: Independent  Ability to ambulate to restroom: Independent  Ability handle personal hygeine needs (bathing/dressing/grooming): Independent  Ability to manage Medications: Independent  Ability to prepare Food Preparation: Independent  Ability to maintain home (clean home, laundry): Independent  Ability to drive and/or has transportation: Independent  Ability to do shopping: Independent  Ability to manage finances: Independent  Is patient able to live independently?: Yes     Current Housing: Private Residence  Who do you

## 2024-03-04 RX ORDER — FAMOTIDINE 20 MG/1
TABLET, FILM COATED ORAL
Qty: 90 TABLET | Refills: 1 | Status: SHIPPED | OUTPATIENT
Start: 2024-03-04

## 2024-03-11 ENCOUNTER — CARE COORDINATION (OUTPATIENT)
Facility: CLINIC | Age: 34
End: 2024-03-11

## 2024-03-11 SDOH — ECONOMIC STABILITY: TRANSPORTATION INSECURITY
IN THE PAST 12 MONTHS, HAS THE LACK OF TRANSPORTATION KEPT YOU FROM MEDICAL APPOINTMENTS OR FROM GETTING MEDICATIONS?: NO

## 2024-03-11 SDOH — HEALTH STABILITY: MENTAL HEALTH
STRESS IS WHEN SOMEONE FEELS TENSE, NERVOUS, ANXIOUS, OR CAN'T SLEEP AT NIGHT BECAUSE THEIR MIND IS TROUBLED. HOW STRESSED ARE YOU?: ONLY A LITTLE

## 2024-03-11 SDOH — HEALTH STABILITY: PHYSICAL HEALTH: ON AVERAGE, HOW MANY MINUTES DO YOU ENGAGE IN EXERCISE AT THIS LEVEL?: 30 MIN

## 2024-03-11 SDOH — ECONOMIC STABILITY: FOOD INSECURITY: WITHIN THE PAST 12 MONTHS, YOU WORRIED THAT YOUR FOOD WOULD RUN OUT BEFORE YOU GOT MONEY TO BUY MORE.: NEVER TRUE

## 2024-03-11 SDOH — HEALTH STABILITY: PHYSICAL HEALTH: ON AVERAGE, HOW MANY DAYS PER WEEK DO YOU ENGAGE IN MODERATE TO STRENUOUS EXERCISE (LIKE A BRISK WALK)?: 6 DAYS

## 2024-03-11 SDOH — ECONOMIC STABILITY: INCOME INSECURITY: IN THE LAST 12 MONTHS, WAS THERE A TIME WHEN YOU WERE NOT ABLE TO PAY THE MORTGAGE OR RENT ON TIME?: NO

## 2024-03-11 SDOH — ECONOMIC STABILITY: INCOME INSECURITY: HOW HARD IS IT FOR YOU TO PAY FOR THE VERY BASICS LIKE FOOD, HOUSING, MEDICAL CARE, AND HEATING?: NOT HARD AT ALL

## 2024-03-11 SDOH — ECONOMIC STABILITY: HOUSING INSECURITY: IN THE LAST 12 MONTHS, HOW MANY PLACES HAVE YOU LIVED?: 1

## 2024-03-11 SDOH — ECONOMIC STABILITY: TRANSPORTATION INSECURITY
IN THE PAST 12 MONTHS, HAS LACK OF TRANSPORTATION KEPT YOU FROM MEETINGS, WORK, OR FROM GETTING THINGS NEEDED FOR DAILY LIVING?: NO

## 2024-03-11 SDOH — HEALTH STABILITY: MENTAL HEALTH: HOW MANY STANDARD DRINKS CONTAINING ALCOHOL DO YOU HAVE ON A TYPICAL DAY?: 1 OR 2

## 2024-03-11 SDOH — ECONOMIC STABILITY: FOOD INSECURITY: WITHIN THE PAST 12 MONTHS, THE FOOD YOU BOUGHT JUST DIDN'T LAST AND YOU DIDN'T HAVE MONEY TO GET MORE.: NEVER TRUE

## 2024-03-11 SDOH — HEALTH STABILITY: MENTAL HEALTH: HOW OFTEN DO YOU HAVE A DRINK CONTAINING ALCOHOL?: MONTHLY OR LESS

## 2024-03-11 ASSESSMENT — PATIENT HEALTH QUESTIONNAIRE - PHQ9
2. FEELING DOWN, DEPRESSED OR HOPELESS: SEVERAL DAYS
SUM OF ALL RESPONSES TO PHQ9 QUESTIONS 1 & 2: 2
1. LITTLE INTEREST OR PLEASURE IN DOING THINGS: SEVERAL DAYS

## 2024-03-11 NOTE — CARE COORDINATION
Ambulatory Care Coordination Note  3/11/2024    Patient Current Location:  [place]     AC contacted the patient by telephone. Verified name and  with patient as identifiers. Provided introduction to self, and explanation of the ACM role.     Challenges to be reviewed by the provider   Additional needs identified to be addressed with provider:   none               Method of communication with provider: staff message.    ACM: Albania Alford RN    ACM reviewed patient chart and then noted that patient was seen in ED 3/7/2024 for chest pain. She was released the same day. Patient stated that she is doing well. No chest pain since discharge. She has all meds and is taking as directed. No concerns noted. Patient stated that she is eating well. She stated that she tries to eat healthy, incorporating fresh fruits and vegetables. Patient moves about as she wants without difficulty.     Offered patient enrollment in the Remote Patient Monitoring (RPM) program for in-home monitoring: Patient declined.    Care Coordination Interventions    Referral from Primary Care Provider: No  Suggested Interventions and Community Resources          Goals Addressed                   This Visit's Progress     Attends follow up appointments on schedule        Encouraged patient to attend follow up appts.        Take all medications as ordered        Encouraged patient to take meds as prescribed.               No future appointments.

## 2024-03-12 DIAGNOSIS — Z76.0 MEDICATION REFILL: ICD-10-CM

## 2024-03-12 DIAGNOSIS — Z79.891 ENCOUNTER FOR LONG-TERM USE OF OPIATE ANALGESIC: ICD-10-CM

## 2024-03-12 DIAGNOSIS — M54.50 CHRONIC BILATERAL LOW BACK PAIN, UNSPECIFIED WHETHER SCIATICA PRESENT: ICD-10-CM

## 2024-03-12 DIAGNOSIS — G89.29 CHRONIC BILATERAL LOW BACK PAIN, UNSPECIFIED WHETHER SCIATICA PRESENT: ICD-10-CM

## 2024-03-12 RX ORDER — HYDROCODONE BITARTRATE AND ACETAMINOPHEN 5; 325 MG/1; MG/1
1 TABLET ORAL EVERY 6 HOURS PRN
Qty: 90 TABLET | Refills: 0 | Status: SHIPPED | OUTPATIENT
Start: 2024-03-12 | End: 2024-04-11

## 2024-03-20 ENCOUNTER — TELEPHONE (OUTPATIENT)
Age: 34
End: 2024-03-20

## 2024-03-20 NOTE — TELEPHONE ENCOUNTER
Dr. Lucille Bravo faxed request for cardiac clearance for extractions. No date on procedure on form.     Verbal order and read back per Teresa Napier DO  In absence of Dr. Porter     Low to moderate risk from a cardiac standpoint       Faxed form to provider

## 2024-04-01 ENCOUNTER — CARE COORDINATION (OUTPATIENT)
Facility: CLINIC | Age: 34
End: 2024-04-01

## 2024-04-01 NOTE — CARE COORDINATION
4/1/2024       10:18 AM    ACM reviewed patient chart and then attempted to contact patient for routine follow up. She was not available at the time of the call. Left a voice mail message for her introducing myself, explaining the reason for the call and providing my contact information. Requested that patient return my call at her earliest convenience. Will follow.

## 2024-04-01 NOTE — CARE COORDINATION
Ambulatory Care Coordination Note  2024    Patient Current Location:  [facility] contacted the patient by telephone. Verified name and  with patient as identifiers. Provided introduction to self, and explanation of the ACM role.     Challenges to be reviewed by the provider   Additional needs identified to be addressed with provider: No  none               Method of communication with provider: staff message.    ACM: Albania Alford RN    ACM received a call back from patient after leaving a message for her. She stated that she is doing great at present time. She has no care needs at present time. She stated that she has all meds filled and is taking as directed. She is eating and drinking well. She is striving to choose healthy options. We discussed ACP and the importance of having something in writing if there comes a time that patient can't verbalize her wishes for medical decisions. Patient verbalized under standing. Will follow.     Offered patient enrollment in the Remote Patient Monitoring (RPM) program for in-home monitoring: Patient declined.      Care Coordination Interventions    Referral from Primary Care Provider: No  Suggested Interventions and Community Resources          Goals Addressed                   This Visit's Progress     Attends follow up appointments on schedule        Encouraged patient to attend follow up appts.     Patient missed appt- encouraged her to attend to keep followed up with physicians        Take all medications as ordered        Encouraged patient to take meds as prescribed.     Patient has all meds and is taking as directed.               No future appointments.

## 2024-04-02 ENCOUNTER — TELEPHONE (OUTPATIENT)
Age: 34
End: 2024-04-02

## 2024-04-02 DIAGNOSIS — Z76.0 MEDICATION REFILL: ICD-10-CM

## 2024-04-02 RX ORDER — FLUCONAZOLE 200 MG/1
200 TABLET ORAL DAILY
Qty: 3 TABLET | Refills: 1 | Status: SHIPPED | OUTPATIENT
Start: 2024-04-02

## 2024-04-02 NOTE — TELEPHONE ENCOUNTER
Patient called to report her Dentist's office advised they never received a response to their request for Clearance for patient to have teeth extracted. Per Encounter on 3/20, that info was sent to that provider. Patient asking if the info can be resubmitted to that office.

## 2024-04-02 NOTE — TELEPHONE ENCOUNTER
Last Appointment:  2/1/2024  Future Appointments   Date Time Provider Department Center   5/23/2024  1:30 PM Margy Ruth MD [facility] AMB   5/28/2024  1:30 PM Margy Ruth MD [facility] AMB

## 2024-04-03 DIAGNOSIS — F32.A ANXIETY AND DEPRESSION: ICD-10-CM

## 2024-04-03 DIAGNOSIS — F41.9 ANXIETY AND DEPRESSION: ICD-10-CM

## 2024-04-03 RX ORDER — BUPROPION HYDROCHLORIDE 150 MG/1
150 TABLET ORAL EVERY MORNING
Qty: 90 TABLET | Refills: 1 | Status: SHIPPED | OUTPATIENT
Start: 2024-04-03

## 2024-04-03 NOTE — TELEPHONE ENCOUNTER
Received request from the pharmacy to resubmit the Wellbutrin XL prescription for 90 days not 30 days at a time.

## 2024-04-10 DIAGNOSIS — Z76.0 MEDICATION REFILL: ICD-10-CM

## 2024-04-10 DIAGNOSIS — Z79.891 ENCOUNTER FOR LONG-TERM USE OF OPIATE ANALGESIC: ICD-10-CM

## 2024-04-10 DIAGNOSIS — G89.29 CHRONIC BILATERAL LOW BACK PAIN, UNSPECIFIED WHETHER SCIATICA PRESENT: ICD-10-CM

## 2024-04-10 DIAGNOSIS — M54.50 CHRONIC BILATERAL LOW BACK PAIN, UNSPECIFIED WHETHER SCIATICA PRESENT: ICD-10-CM

## 2024-04-10 RX ORDER — HYDROCODONE BITARTRATE AND ACETAMINOPHEN 5; 325 MG/1; MG/1
1 TABLET ORAL EVERY 6 HOURS PRN
Qty: 90 TABLET | Refills: 0 | Status: SHIPPED | OUTPATIENT
Start: 2024-04-10 | End: 2024-05-10

## 2024-04-15 ENCOUNTER — CARE COORDINATION (OUTPATIENT)
Facility: CLINIC | Age: 34
End: 2024-04-15

## 2024-04-15 NOTE — CARE COORDINATION
Ambulatory Care Coordination Note  4/15/2024    Patient Current Location:  [facility] contacted the patient by telephone. Verified name and  with patient as identifiers. Provided introduction to self, and explanation of the ACM role.     Challenges to be reviewed by the provider   Additional needs identified to be addressed with provider: No  none               Method of communication with provider: staff message.    ACM: Albania Alford RN    ACM reviewed patient chart and then called patient for routine follow up. She stated that she is doing well at present. She has all meds as she has gotten her refills. She takes each as instructed. Patient is eating and drinking well. She is moving about but there are times she moves a little slower due to back issues. We discussed back problems and what she can do to help. Encouraged patient to do daily stretching for her back and to walk when able. Also encouraged her to use ice on back to provide some relief when having a bad day. Will follow.     Offered patient enrollment in the Remote Patient Monitoring (RPM) program for in-home monitoring: Patient declined.    Care Coordination Interventions    Referral from Primary Care Provider: No  Suggested Interventions and Community Resources          Goals Addressed                   This Visit's Progress     Attends follow up appointments on schedule        Encouraged patient to attend follow up appts.     Patient missed appt- encouraged her to attend to keep followed up with physicians     Patient has appt with PCP        Take all medications as ordered        Encouraged patient to take meds as prescribed.     Patient has all meds and is taking as directed.     Patient has gotten refills so she has all meds and is taking as she should.               Future Appointments   Date Time Provider Department [place]   2024  1:30 PM Margy Ruth MD [facility]   2024  1:30 PM Margy Ruth MD [facility]

## 2024-04-29 ENCOUNTER — CARE COORDINATION (OUTPATIENT)
Facility: CLINIC | Age: 34
End: 2024-04-29

## 2024-04-29 NOTE — CARE COORDINATION
Ambulatory Care Coordination Note  2024    Patient Current Location:  Home: [address]  [place] [address]     ACM contacted the patient by telephone. Verified name and  with patient as identifiers. Provided introduction to self, and explanation of the ACM role.     Challenges to be reviewed by the provider   Additional needs identified to be addressed with provider: No  none               Method of communication with provider: staff message.    ACM: Albania Alford RN    ACM reviewed chart and then called patient for routine follow up. Patient stated that she is doing well. She has all meds and is taking as directed. No concerns. Patient stated that she is eating well. She strives to eat healthy. Patient is back to activities without difficulty. Patient had no care needs today.     Offered patient enrollment in the Remote Patient Monitoring (RPM) program for in-home monitoring: Yes, but did not enroll at this time: declined to enroll in the program because she stated that she is ok - does not feel the need to monitor.    Care Coordination Interventions    Referral from Primary Care Provider: No  Suggested Interventions and Community Resources          Goals Addressed                   This Visit's Progress     Attends follow up appointments on schedule        Encouraged patient to attend follow up appts.     Patient missed appt- encouraged her to attend to keep followed up with physicians     Patient has appt with PCP - reminded of appt       Take all medications as ordered        Encouraged patient to take meds as prescribed.     Patient has all meds and is taking as directed.     Patient has gotten refills so she has all meds and is taking as she should.     Patient has al meds ad is taking as instructed.               Future Appointments   Date Time Provider Department Center   2024  1:30 PM Margy Ruth MD [facility] AMB   2024  1:30 PM Margy Ruth MD [facility] AMB

## 2024-05-09 DIAGNOSIS — J45.20 MILD INTERMITTENT ASTHMA WITHOUT COMPLICATION: ICD-10-CM

## 2024-05-09 DIAGNOSIS — G89.29 CHRONIC BILATERAL LOW BACK PAIN, UNSPECIFIED WHETHER SCIATICA PRESENT: ICD-10-CM

## 2024-05-09 DIAGNOSIS — M54.50 CHRONIC BILATERAL LOW BACK PAIN, UNSPECIFIED WHETHER SCIATICA PRESENT: ICD-10-CM

## 2024-05-09 DIAGNOSIS — Z79.891 ENCOUNTER FOR LONG-TERM USE OF OPIATE ANALGESIC: ICD-10-CM

## 2024-05-09 DIAGNOSIS — Z76.0 MEDICATION REFILL: ICD-10-CM

## 2024-05-12 RX ORDER — PREDNISONE 10 MG/1
TABLET ORAL
Qty: 9 TABLET | Refills: 2 | Status: SHIPPED | OUTPATIENT
Start: 2024-05-12

## 2024-05-12 RX ORDER — HYDROCODONE BITARTRATE AND ACETAMINOPHEN 5; 325 MG/1; MG/1
1 TABLET ORAL EVERY 6 HOURS PRN
Qty: 90 TABLET | Refills: 0 | Status: SHIPPED | OUTPATIENT
Start: 2024-05-12 | End: 2024-06-11

## 2024-05-13 ENCOUNTER — CARE COORDINATION (OUTPATIENT)
Facility: CLINIC | Age: 34
End: 2024-05-13

## 2024-05-13 DIAGNOSIS — Z76.0 MEDICATION REFILL: ICD-10-CM

## 2024-05-13 RX ORDER — FLUCONAZOLE 200 MG/1
200 TABLET ORAL DAILY
Qty: 3 TABLET | Refills: 1 | Status: SHIPPED | OUTPATIENT
Start: 2024-05-13

## 2024-05-13 NOTE — CARE COORDINATION
5/13/2024        3:54 PM    ACM reviewed patient chart and then attempted to call her for routine follow up. She was unavailable at the time of the call. Left a voice mail message for her introducing myself, explaining the reason for the call and providing my contact information. Requested that patient return my call at her earliest convenience. Will follow.

## 2024-05-23 ENCOUNTER — OFFICE VISIT (OUTPATIENT)
Facility: CLINIC | Age: 34
End: 2024-05-23
Payer: MEDICAID

## 2024-05-23 VITALS
WEIGHT: 240 LBS | OXYGEN SATURATION: 98 % | TEMPERATURE: 97 F | HEART RATE: 85 BPM | RESPIRATION RATE: 18 BRPM | BODY MASS INDEX: 45.31 KG/M2 | DIASTOLIC BLOOD PRESSURE: 73 MMHG | SYSTOLIC BLOOD PRESSURE: 115 MMHG | HEIGHT: 61 IN

## 2024-05-23 DIAGNOSIS — L08.9 FINGER INFECTION: ICD-10-CM

## 2024-05-23 DIAGNOSIS — G56.22 ULNAR NEUROPATHY AT ELBOW OF LEFT UPPER EXTREMITY: ICD-10-CM

## 2024-05-23 DIAGNOSIS — Z79.891 ENCOUNTER FOR LONG-TERM USE OF OPIATE ANALGESIC: ICD-10-CM

## 2024-05-23 DIAGNOSIS — G56.03 BILATERAL CARPAL TUNNEL SYNDROME: ICD-10-CM

## 2024-05-23 DIAGNOSIS — R20.2 TINGLING IN EXTREMITIES: ICD-10-CM

## 2024-05-23 DIAGNOSIS — E11.9 CONTROLLED TYPE 2 DIABETES MELLITUS WITHOUT COMPLICATION, WITHOUT LONG-TERM CURRENT USE OF INSULIN (HCC): Primary | ICD-10-CM

## 2024-05-23 DIAGNOSIS — M79.602 PAIN OF LEFT UPPER EXTREMITY: ICD-10-CM

## 2024-05-23 DIAGNOSIS — R63.5 WEIGHT GAIN: ICD-10-CM

## 2024-05-23 DIAGNOSIS — E66.01 OBESITY, CLASS III, BMI 40-49.9 (MORBID OBESITY) (HCC): ICD-10-CM

## 2024-05-23 PROCEDURE — 99214 OFFICE O/P EST MOD 30 MIN: CPT | Performed by: INTERNAL MEDICINE

## 2024-05-23 RX ORDER — CEPHALEXIN 500 MG/1
500 CAPSULE ORAL 3 TIMES DAILY
Qty: 21 CAPSULE | Refills: 0 | Status: SHIPPED | OUTPATIENT
Start: 2024-05-23 | End: 2024-05-30

## 2024-05-23 RX ORDER — PREGABALIN 75 MG/1
75 CAPSULE ORAL 2 TIMES DAILY
Qty: 60 CAPSULE | Refills: 5 | Status: SHIPPED | OUTPATIENT
Start: 2024-05-23 | End: 2024-11-19

## 2024-05-23 ASSESSMENT — PATIENT HEALTH QUESTIONNAIRE - PHQ9
5. POOR APPETITE OR OVEREATING: NOT AT ALL
SUM OF ALL RESPONSES TO PHQ QUESTIONS 1-9: 0
7. TROUBLE CONCENTRATING ON THINGS, SUCH AS READING THE NEWSPAPER OR WATCHING TELEVISION: NOT AT ALL
SUM OF ALL RESPONSES TO PHQ QUESTIONS 1-9: 0
SUM OF ALL RESPONSES TO PHQ9 QUESTIONS 1 & 2: 0
9. THOUGHTS THAT YOU WOULD BE BETTER OFF DEAD, OR OF HURTING YOURSELF: NOT AT ALL
4. FEELING TIRED OR HAVING LITTLE ENERGY: NOT AT ALL
SUM OF ALL RESPONSES TO PHQ QUESTIONS 1-9: 0
SUM OF ALL RESPONSES TO PHQ QUESTIONS 1-9: 0
10. IF YOU CHECKED OFF ANY PROBLEMS, HOW DIFFICULT HAVE THESE PROBLEMS MADE IT FOR YOU TO DO YOUR WORK, TAKE CARE OF THINGS AT HOME, OR GET ALONG WITH OTHER PEOPLE: NOT DIFFICULT AT ALL
1. LITTLE INTEREST OR PLEASURE IN DOING THINGS: NOT AT ALL
8. MOVING OR SPEAKING SO SLOWLY THAT OTHER PEOPLE COULD HAVE NOTICED. OR THE OPPOSITE, BEING SO FIGETY OR RESTLESS THAT YOU HAVE BEEN MOVING AROUND A LOT MORE THAN USUAL: NOT AT ALL
2. FEELING DOWN, DEPRESSED OR HOPELESS: NOT AT ALL
3. TROUBLE FALLING OR STAYING ASLEEP: NOT AT ALL
6. FEELING BAD ABOUT YOURSELF - OR THAT YOU ARE A FAILURE OR HAVE LET YOURSELF OR YOUR FAMILY DOWN: NOT AT ALL

## 2024-05-23 ASSESSMENT — ENCOUNTER SYMPTOMS
SHORTNESS OF BREATH: 0
CHEST TIGHTNESS: 0

## 2024-05-24 LAB
ALBUMIN SERPL-MCNC: 3.9 G/DL (ref 3.9–4.9)
ALBUMIN/GLOB SERPL: 0.8 {RATIO} (ref 1.2–2.2)
ALP SERPL-CCNC: 141 IU/L (ref 44–121)
ALT SERPL-CCNC: 88 IU/L (ref 0–32)
AST SERPL-CCNC: 81 IU/L (ref 0–40)
BILIRUB SERPL-MCNC: 0.3 MG/DL (ref 0–1.2)
BUN SERPL-MCNC: 10 MG/DL (ref 6–20)
BUN/CREAT SERPL: 11 (ref 9–23)
CALCIUM SERPL-MCNC: 9.5 MG/DL (ref 8.7–10.2)
CHLORIDE SERPL-SCNC: 102 MMOL/L (ref 96–106)
CHOLEST SERPL-MCNC: 150 MG/DL (ref 100–199)
CO2 SERPL-SCNC: 24 MMOL/L (ref 20–29)
CREAT SERPL-MCNC: 0.87 MG/DL (ref 0.57–1)
EGFRCR SERPLBLD CKD-EPI 2021: 90 ML/MIN/1.73
GLOBULIN SER CALC-MCNC: 4.7 G/DL (ref 1.5–4.5)
GLUCOSE SERPL-MCNC: 119 MG/DL (ref 70–99)
HBA1C MFR BLD: 8.4 % (ref 4.8–5.6)
HDLC SERPL-MCNC: 38 MG/DL
INTERPRETATION: NORMAL
LDLC SERPL CALC-MCNC: 82 MG/DL (ref 0–99)
POTASSIUM SERPL-SCNC: 4.1 MMOL/L (ref 3.5–5.2)
PROT SERPL-MCNC: 8.6 G/DL (ref 6–8.5)
SODIUM SERPL-SCNC: 138 MMOL/L (ref 134–144)
T4 FREE SERPL-MCNC: 1.09 NG/DL (ref 0.82–1.77)
TRIGL SERPL-MCNC: 175 MG/DL (ref 0–149)
TSH SERPL DL<=0.005 MIU/L-ACNC: 2.43 UIU/ML (ref 0.45–4.5)
VIT B12 SERPL-MCNC: 455 PG/ML (ref 232–1245)
VLDLC SERPL CALC-MCNC: 30 MG/DL (ref 5–40)

## 2024-05-24 RX ORDER — SEMAGLUTIDE 0.68 MG/ML
INJECTION, SOLUTION SUBCUTANEOUS
Qty: 3 ML | Refills: 1 | Status: SHIPPED | OUTPATIENT
Start: 2024-05-24

## 2024-05-24 NOTE — PROGRESS NOTES
Adding Ozempic due to worsening diabetic control  
\"Have you been to the ER, urgent care clinic since your last visit?  Hospitalized since your last visit?\"    NO    “Have you seen or consulted any other health care providers outside of [facility] since your last visit?”    NO            Click Here for Release of Records Request  
been chronic and stable on this dose.  I again discussed with her that we should try to taper further if possible.  I would like to get her down any more than 2 tablets a day if possible.  However with all of her other health problems this may not be possible.    Update lab    Med refills as noted    F/u 4 months for MWV, weight, DM, CTS

## 2024-05-28 ENCOUNTER — CARE COORDINATION (OUTPATIENT)
Facility: CLINIC | Age: 34
End: 2024-05-28

## 2024-05-28 ENCOUNTER — OFFICE VISIT (OUTPATIENT)
Facility: CLINIC | Age: 34
End: 2024-05-28
Payer: MEDICAID

## 2024-05-28 VITALS
TEMPERATURE: 93.7 F | BODY MASS INDEX: 44.27 KG/M2 | WEIGHT: 234.5 LBS | SYSTOLIC BLOOD PRESSURE: 129 MMHG | HEIGHT: 61 IN | DIASTOLIC BLOOD PRESSURE: 88 MMHG | HEART RATE: 84 BPM | RESPIRATION RATE: 15 BRPM

## 2024-05-28 DIAGNOSIS — Z00.00 MEDICARE ANNUAL WELLNESS VISIT, SUBSEQUENT: Primary | ICD-10-CM

## 2024-05-28 LAB — DRUGS UR: NORMAL

## 2024-05-28 PROCEDURE — G0439 PPPS, SUBSEQ VISIT: HCPCS | Performed by: INTERNAL MEDICINE

## 2024-05-28 SDOH — ECONOMIC STABILITY: INCOME INSECURITY: HOW HARD IS IT FOR YOU TO PAY FOR THE VERY BASICS LIKE FOOD, HOUSING, MEDICAL CARE, AND HEATING?: NOT HARD AT ALL

## 2024-05-28 SDOH — ECONOMIC STABILITY: FOOD INSECURITY: WITHIN THE PAST 12 MONTHS, THE FOOD YOU BOUGHT JUST DIDN'T LAST AND YOU DIDN'T HAVE MONEY TO GET MORE.: NEVER TRUE

## 2024-05-28 SDOH — ECONOMIC STABILITY: FOOD INSECURITY: WITHIN THE PAST 12 MONTHS, YOU WORRIED THAT YOUR FOOD WOULD RUN OUT BEFORE YOU GOT MONEY TO BUY MORE.: NEVER TRUE

## 2024-05-28 ASSESSMENT — PATIENT HEALTH QUESTIONNAIRE - PHQ9
4. FEELING TIRED OR HAVING LITTLE ENERGY: NOT AT ALL
SUM OF ALL RESPONSES TO PHQ9 QUESTIONS 1 & 2: 0
3. TROUBLE FALLING OR STAYING ASLEEP: NOT AT ALL
1. LITTLE INTEREST OR PLEASURE IN DOING THINGS: NOT AT ALL
SUM OF ALL RESPONSES TO PHQ QUESTIONS 1-9: 0
2. FEELING DOWN, DEPRESSED OR HOPELESS: NOT AT ALL
8. MOVING OR SPEAKING SO SLOWLY THAT OTHER PEOPLE COULD HAVE NOTICED. OR THE OPPOSITE, BEING SO FIGETY OR RESTLESS THAT YOU HAVE BEEN MOVING AROUND A LOT MORE THAN USUAL: NOT AT ALL
10. IF YOU CHECKED OFF ANY PROBLEMS, HOW DIFFICULT HAVE THESE PROBLEMS MADE IT FOR YOU TO DO YOUR WORK, TAKE CARE OF THINGS AT HOME, OR GET ALONG WITH OTHER PEOPLE: NOT DIFFICULT AT ALL
SUM OF ALL RESPONSES TO PHQ QUESTIONS 1-9: 0
9. THOUGHTS THAT YOU WOULD BE BETTER OFF DEAD, OR OF HURTING YOURSELF: NOT AT ALL
7. TROUBLE CONCENTRATING ON THINGS, SUCH AS READING THE NEWSPAPER OR WATCHING TELEVISION: NOT AT ALL
SUM OF ALL RESPONSES TO PHQ QUESTIONS 1-9: 0
5. POOR APPETITE OR OVEREATING: NOT AT ALL
SUM OF ALL RESPONSES TO PHQ QUESTIONS 1-9: 0
6. FEELING BAD ABOUT YOURSELF - OR THAT YOU ARE A FAILURE OR HAVE LET YOURSELF OR YOUR FAMILY DOWN: NOT AT ALL

## 2024-05-28 ASSESSMENT — LIFESTYLE VARIABLES
HOW OFTEN DO YOU HAVE A DRINK CONTAINING ALCOHOL: MONTHLY OR LESS
HOW MANY STANDARD DRINKS CONTAINING ALCOHOL DO YOU HAVE ON A TYPICAL DAY: 1 OR 2

## 2024-05-28 NOTE — PROGRESS NOTES
Medicare Annual Wellness Visit    Pauly Rogel is here for Medicare AWV    Assessment & Plan   Medicare annual wellness visit, subsequent    Recommendations for Preventive Services Due: see orders and patient instructions/AVS.  Recommended screening schedule for the next 5-10 years is provided to the patient in written form: see Patient Instructions/AVS.     No follow-ups on file.     Subjective       Patient's complete Health Risk Assessment and screening values have been reviewed and are found in Flowsheets. The following problems were reviewed today and where indicated follow up appointments were made and/or referrals ordered.    Positive Risk Factor Screenings with Interventions:            Controlled Medication Review:      Today's Pain Level: Pain Score: Zero     Opioid Risk: (Low risk score <55) Opioid risk score: 37    Patient is low risk for opioid use disorder or overdose.    Last PDMP Carlos as Reviewed:  Review User Review Instant Review Result   DEYANIRA, CANDACE 5/23/2024  2:08 PM     Reviewed PDMP [1]        Activity, Diet, and Weight:  On average, how many days per week do you engage in moderate to strenuous exercise (like a brisk walk)?: 6 days  On average, how many minutes do you engage in exercise at this level?: 30 min    Do you eat balanced/healthy meals regularly?: Yes    Body mass index is 44.31 kg/m². (!) Abnormal    Obesity Interventions:  See A/P for plan and any pertinent orders                   Advanced Directives:  Do you have a Living Will?: (!) No  Tobacco Use:  Tobacco Use: High Risk (5/28/2024)    Patient History     Smoking Tobacco Use: Every Day     Smokeless Tobacco Use: Never     Passive Exposure: Current     E-cigarette/Vaping       Questions Responses    E-cigarette/Vaping Use Never User    Start Date     Passive Exposure     Quit Date     Counseling Given     Comments         Interventions:  See A/P for plan and any pertinent orders                      Objective

## 2024-05-28 NOTE — PROGRESS NOTES
\"Have you been to the ER, urgent care clinic since your last visit?  Hospitalized since your last visit?\"    NO    “Have you seen or consulted any other health care providers outside of [facility] since your last visit?”    NO            Click Here for Release of Records Request

## 2024-05-28 NOTE — CARE COORDINATION
5/28/2024        12:19 PM      ACM closing episode at this time.   ACP has been reviewed and information sent to patient as needed.   Use of MyChart has been discussed with patient/family understanding its use.   Med rec has been completed and up to date at time of closing episode. Importance of taking all medications as prescribed and on time, maintaining an adequate supply of medication, and how to obtain refills.   Goals are updated and met to the best of patient's ability.  Importance of keeping all scheduled appointments and how to make those appointments discussed with patient/family understanding.   Review of symptoms and disease process discussed as needed, with patient/family showing understanding.  No further ACM contacts scheduled  Patient/family has ACM contact information for any further questions, concerns or needs

## 2024-06-10 DIAGNOSIS — Z79.891 ENCOUNTER FOR LONG-TERM USE OF OPIATE ANALGESIC: ICD-10-CM

## 2024-06-10 DIAGNOSIS — M54.50 CHRONIC BILATERAL LOW BACK PAIN, UNSPECIFIED WHETHER SCIATICA PRESENT: ICD-10-CM

## 2024-06-10 DIAGNOSIS — G89.29 CHRONIC BILATERAL LOW BACK PAIN, UNSPECIFIED WHETHER SCIATICA PRESENT: ICD-10-CM

## 2024-06-10 DIAGNOSIS — Z76.0 MEDICATION REFILL: ICD-10-CM

## 2024-06-10 RX ORDER — HYDROCODONE BITARTRATE AND ACETAMINOPHEN 5; 325 MG/1; MG/1
1 TABLET ORAL EVERY 6 HOURS PRN
Qty: 90 TABLET | Refills: 0 | Status: SHIPPED | OUTPATIENT
Start: 2024-06-10 | End: 2024-07-10

## 2024-07-05 RX ORDER — CARVEDILOL 6.25 MG/1
TABLET ORAL
Qty: 180 TABLET | Refills: 3 | OUTPATIENT
Start: 2024-07-05

## 2024-07-05 RX ORDER — SACUBITRIL AND VALSARTAN 49; 51 MG/1; MG/1
TABLET, FILM COATED ORAL
Qty: 180 TABLET | Refills: 3 | OUTPATIENT
Start: 2024-07-05

## 2024-07-05 RX ORDER — EMPAGLIFLOZIN 25 MG/1
TABLET, FILM COATED ORAL
Qty: 90 TABLET | Refills: 3 | OUTPATIENT
Start: 2024-07-05

## 2024-07-09 DIAGNOSIS — G89.29 CHRONIC BILATERAL LOW BACK PAIN, UNSPECIFIED WHETHER SCIATICA PRESENT: ICD-10-CM

## 2024-07-09 DIAGNOSIS — M54.50 CHRONIC BILATERAL LOW BACK PAIN, UNSPECIFIED WHETHER SCIATICA PRESENT: ICD-10-CM

## 2024-07-09 DIAGNOSIS — Z76.0 MEDICATION REFILL: ICD-10-CM

## 2024-07-09 DIAGNOSIS — Z79.891 ENCOUNTER FOR LONG-TERM USE OF OPIATE ANALGESIC: ICD-10-CM

## 2024-07-10 RX ORDER — HYDROCODONE BITARTRATE AND ACETAMINOPHEN 5; 325 MG/1; MG/1
1 TABLET ORAL EVERY 6 HOURS PRN
Qty: 90 TABLET | Refills: 0 | Status: SHIPPED | OUTPATIENT
Start: 2024-07-10 | End: 2024-08-09

## 2024-07-15 RX ORDER — SACUBITRIL AND VALSARTAN 49; 51 MG/1; MG/1
TABLET, FILM COATED ORAL
Qty: 180 TABLET | Refills: 3 | OUTPATIENT
Start: 2024-07-15

## 2024-08-09 DIAGNOSIS — Z79.891 ENCOUNTER FOR LONG-TERM USE OF OPIATE ANALGESIC: ICD-10-CM

## 2024-08-09 DIAGNOSIS — G89.29 CHRONIC BILATERAL LOW BACK PAIN, UNSPECIFIED WHETHER SCIATICA PRESENT: ICD-10-CM

## 2024-08-09 DIAGNOSIS — M54.50 CHRONIC BILATERAL LOW BACK PAIN, UNSPECIFIED WHETHER SCIATICA PRESENT: ICD-10-CM

## 2024-08-09 DIAGNOSIS — Z76.0 MEDICATION REFILL: ICD-10-CM

## 2024-08-11 RX ORDER — HYDROCODONE BITARTRATE AND ACETAMINOPHEN 5; 325 MG/1; MG/1
1 TABLET ORAL EVERY 6 HOURS PRN
Qty: 90 TABLET | Refills: 0 | Status: SHIPPED | OUTPATIENT
Start: 2024-08-11 | End: 2024-09-10

## 2024-09-09 DIAGNOSIS — Z79.891 ENCOUNTER FOR LONG-TERM USE OF OPIATE ANALGESIC: ICD-10-CM

## 2024-09-09 DIAGNOSIS — E11.9 CONTROLLED TYPE 2 DIABETES MELLITUS WITHOUT COMPLICATION, WITHOUT LONG-TERM CURRENT USE OF INSULIN (HCC): ICD-10-CM

## 2024-09-09 DIAGNOSIS — Z76.0 MEDICATION REFILL: ICD-10-CM

## 2024-09-09 DIAGNOSIS — M54.50 CHRONIC BILATERAL LOW BACK PAIN, UNSPECIFIED WHETHER SCIATICA PRESENT: ICD-10-CM

## 2024-09-09 DIAGNOSIS — G89.29 CHRONIC BILATERAL LOW BACK PAIN, UNSPECIFIED WHETHER SCIATICA PRESENT: ICD-10-CM

## 2024-09-09 DIAGNOSIS — E66.01 OBESITY, CLASS III, BMI 40-49.9 (MORBID OBESITY) (HCC): ICD-10-CM

## 2024-09-09 RX ORDER — FLUCONAZOLE 200 MG/1
200 TABLET ORAL DAILY
Qty: 3 TABLET | Refills: 1 | Status: SHIPPED | OUTPATIENT
Start: 2024-09-09

## 2024-09-09 RX ORDER — HYDROCODONE BITARTRATE AND ACETAMINOPHEN 5; 325 MG/1; MG/1
1 TABLET ORAL EVERY 6 HOURS PRN
Qty: 90 TABLET | Refills: 0 | Status: SHIPPED | OUTPATIENT
Start: 2024-09-09 | End: 2024-10-09

## 2024-09-09 RX ORDER — SEMAGLUTIDE 0.68 MG/ML
INJECTION, SOLUTION SUBCUTANEOUS
Qty: 3 ML | Refills: 1 | Status: SHIPPED | OUTPATIENT
Start: 2024-09-09

## 2024-09-10 NOTE — TELEPHONE ENCOUNTER
Pt requesting refill. Last ov 12/1/22, no future appts made. Requested Prescriptions     Pending Prescriptions Disp Refills    HYDROcodone-acetaminophen (NORCO) 5-325 mg per tablet 105 Tablet 0     Sig: Take 1 Tablet by mouth every six (6) hours as needed for Pain for up to 30 days. Max Daily Amount: 4 Tablets.  Indications: pain
.

## 2024-10-06 DIAGNOSIS — Z79.891 ENCOUNTER FOR LONG-TERM USE OF OPIATE ANALGESIC: ICD-10-CM

## 2024-10-06 DIAGNOSIS — M54.50 CHRONIC BILATERAL LOW BACK PAIN, UNSPECIFIED WHETHER SCIATICA PRESENT: ICD-10-CM

## 2024-10-06 DIAGNOSIS — G89.29 CHRONIC BILATERAL LOW BACK PAIN, UNSPECIFIED WHETHER SCIATICA PRESENT: ICD-10-CM

## 2024-10-06 DIAGNOSIS — Z76.0 MEDICATION REFILL: ICD-10-CM

## 2024-10-06 RX ORDER — HYDROCODONE BITARTRATE AND ACETAMINOPHEN 5; 325 MG/1; MG/1
1 TABLET ORAL EVERY 6 HOURS PRN
Qty: 90 TABLET | Refills: 0 | Status: SHIPPED | OUTPATIENT
Start: 2024-10-06 | End: 2024-11-05

## 2024-11-05 DIAGNOSIS — M54.50 CHRONIC BILATERAL LOW BACK PAIN, UNSPECIFIED WHETHER SCIATICA PRESENT: ICD-10-CM

## 2024-11-05 DIAGNOSIS — Z76.0 MEDICATION REFILL: ICD-10-CM

## 2024-11-05 DIAGNOSIS — J45.20 MILD INTERMITTENT ASTHMA WITHOUT COMPLICATION: ICD-10-CM

## 2024-11-05 DIAGNOSIS — Z79.891 ENCOUNTER FOR LONG-TERM USE OF OPIATE ANALGESIC: ICD-10-CM

## 2024-11-05 DIAGNOSIS — G89.29 CHRONIC BILATERAL LOW BACK PAIN, UNSPECIFIED WHETHER SCIATICA PRESENT: ICD-10-CM

## 2024-11-05 RX ORDER — HYDROCODONE BITARTRATE AND ACETAMINOPHEN 5; 325 MG/1; MG/1
1 TABLET ORAL EVERY 6 HOURS PRN
Qty: 90 TABLET | Refills: 0 | Status: SHIPPED | OUTPATIENT
Start: 2024-11-05 | End: 2024-12-05

## 2024-11-05 RX ORDER — PREDNISONE 10 MG/1
TABLET ORAL
Qty: 9 TABLET | Refills: 2 | Status: SHIPPED | OUTPATIENT
Start: 2024-11-05

## 2024-11-05 NOTE — TELEPHONE ENCOUNTER
Last Appointment:  5/28/2024  Future Appointments   Date Time Provider Department Center   11/6/2024 10:00 AM Margy Ruth MD [facility]

## 2024-11-06 ENCOUNTER — OFFICE VISIT (OUTPATIENT)
Facility: CLINIC | Age: 34
End: 2024-11-06
Payer: MEDICARE

## 2024-11-06 VITALS
BODY MASS INDEX: 43.99 KG/M2 | SYSTOLIC BLOOD PRESSURE: 139 MMHG | DIASTOLIC BLOOD PRESSURE: 94 MMHG | RESPIRATION RATE: 18 BRPM | TEMPERATURE: 97.1 F | OXYGEN SATURATION: 97 % | HEART RATE: 96 BPM | WEIGHT: 233 LBS | HEIGHT: 61 IN

## 2024-11-06 DIAGNOSIS — I42.0 DILATED CARDIOMYOPATHY (HCC): ICD-10-CM

## 2024-11-06 DIAGNOSIS — F31.9 BIPOLAR AFFECTIVE DISORDER, REMISSION STATUS UNSPECIFIED (HCC): ICD-10-CM

## 2024-11-06 DIAGNOSIS — R06.2 WHEEZING: Primary | ICD-10-CM

## 2024-11-06 DIAGNOSIS — R05.1 ACUTE COUGH: ICD-10-CM

## 2024-11-06 DIAGNOSIS — G89.29 CHRONIC BILATERAL LOW BACK PAIN, UNSPECIFIED WHETHER SCIATICA PRESENT: ICD-10-CM

## 2024-11-06 DIAGNOSIS — E11.65 TYPE 2 DIABETES MELLITUS WITH HYPERGLYCEMIA, WITHOUT LONG-TERM CURRENT USE OF INSULIN (HCC): ICD-10-CM

## 2024-11-06 DIAGNOSIS — M54.50 CHRONIC BILATERAL LOW BACK PAIN, UNSPECIFIED WHETHER SCIATICA PRESENT: ICD-10-CM

## 2024-11-06 DIAGNOSIS — E11.9 CONTROLLED TYPE 2 DIABETES MELLITUS WITHOUT COMPLICATION, WITHOUT LONG-TERM CURRENT USE OF INSULIN (HCC): ICD-10-CM

## 2024-11-06 DIAGNOSIS — H65.01 NON-RECURRENT ACUTE SEROUS OTITIS MEDIA OF RIGHT EAR: ICD-10-CM

## 2024-11-06 DIAGNOSIS — Z87.01 HX OF BACTERIAL PNEUMONIA: ICD-10-CM

## 2024-11-06 LAB
EXP DATE SOLUTION: NORMAL
EXP DATE SWAB: NORMAL
EXPIRATION DATE: NORMAL
LOT NUMBER POC: NORMAL
LOT NUMBER SOLUTION: NORMAL
LOT NUMBER SWAB: NORMAL
SARS-COV-2 RNA, POC: NEGATIVE

## 2024-11-06 PROCEDURE — 87635 SARS-COV-2 COVID-19 AMP PRB: CPT | Performed by: INTERNAL MEDICINE

## 2024-11-06 PROCEDURE — 3052F HG A1C>EQUAL 8.0%<EQUAL 9.0%: CPT | Performed by: INTERNAL MEDICINE

## 2024-11-06 PROCEDURE — 99214 OFFICE O/P EST MOD 30 MIN: CPT | Performed by: INTERNAL MEDICINE

## 2024-11-06 RX ORDER — BENZONATATE 200 MG/1
200 CAPSULE ORAL 3 TIMES DAILY PRN
Qty: 21 CAPSULE | Refills: 0 | Status: SHIPPED | OUTPATIENT
Start: 2024-11-06 | End: 2024-11-13

## 2024-11-06 RX ORDER — FLUTICASONE PROPIONATE 50 MCG
2 SPRAY, SUSPENSION (ML) NASAL DAILY
Qty: 16 G | Refills: 5 | Status: SHIPPED | OUTPATIENT
Start: 2024-11-06

## 2024-11-06 RX ORDER — MOXIFLOXACIN HYDROCHLORIDE 400 MG/1
400 TABLET ORAL DAILY
Qty: 5 TABLET | Refills: 0 | Status: SHIPPED | OUTPATIENT
Start: 2024-11-06 | End: 2024-11-11

## 2024-11-06 SDOH — ECONOMIC STABILITY: FOOD INSECURITY: WITHIN THE PAST 12 MONTHS, THE FOOD YOU BOUGHT JUST DIDN'T LAST AND YOU DIDN'T HAVE MONEY TO GET MORE.: NEVER TRUE

## 2024-11-06 SDOH — ECONOMIC STABILITY: FOOD INSECURITY: WITHIN THE PAST 12 MONTHS, YOU WORRIED THAT YOUR FOOD WOULD RUN OUT BEFORE YOU GOT MONEY TO BUY MORE.: NEVER TRUE

## 2024-11-06 SDOH — ECONOMIC STABILITY: INCOME INSECURITY: HOW HARD IS IT FOR YOU TO PAY FOR THE VERY BASICS LIKE FOOD, HOUSING, MEDICAL CARE, AND HEATING?: NOT HARD AT ALL

## 2024-11-06 ASSESSMENT — PATIENT HEALTH QUESTIONNAIRE - PHQ9
SUM OF ALL RESPONSES TO PHQ QUESTIONS 1-9: 0
7. TROUBLE CONCENTRATING ON THINGS, SUCH AS READING THE NEWSPAPER OR WATCHING TELEVISION: NOT AT ALL
3. TROUBLE FALLING OR STAYING ASLEEP: NOT AT ALL
8. MOVING OR SPEAKING SO SLOWLY THAT OTHER PEOPLE COULD HAVE NOTICED. OR THE OPPOSITE, BEING SO FIGETY OR RESTLESS THAT YOU HAVE BEEN MOVING AROUND A LOT MORE THAN USUAL: NOT AT ALL
4. FEELING TIRED OR HAVING LITTLE ENERGY: NOT AT ALL
10. IF YOU CHECKED OFF ANY PROBLEMS, HOW DIFFICULT HAVE THESE PROBLEMS MADE IT FOR YOU TO DO YOUR WORK, TAKE CARE OF THINGS AT HOME, OR GET ALONG WITH OTHER PEOPLE: NOT DIFFICULT AT ALL
9. THOUGHTS THAT YOU WOULD BE BETTER OFF DEAD, OR OF HURTING YOURSELF: NOT AT ALL
SUM OF ALL RESPONSES TO PHQ9 QUESTIONS 1 & 2: 0
SUM OF ALL RESPONSES TO PHQ QUESTIONS 1-9: 0
2. FEELING DOWN, DEPRESSED OR HOPELESS: NOT AT ALL
1. LITTLE INTEREST OR PLEASURE IN DOING THINGS: NOT AT ALL
5. POOR APPETITE OR OVEREATING: NOT AT ALL
6. FEELING BAD ABOUT YOURSELF - OR THAT YOU ARE A FAILURE OR HAVE LET YOURSELF OR YOUR FAMILY DOWN: NOT AT ALL

## 2024-11-06 ASSESSMENT — ENCOUNTER SYMPTOMS
COUGH: 1
CHEST TIGHTNESS: 1
WHEEZING: 1
SHORTNESS OF BREATH: 1
BACK PAIN: 1

## 2024-11-06 NOTE — PROGRESS NOTES
\"Have you been to the ER, urgent care clinic since your last visit?  Hospitalized since your last visit?\"    YES - When: approximately 1 months ago.  Where and Why: [facility] for PNA.    “Have you seen or consulted any other health care providers outside our system since your last visit?”    NO      “Have you had a diabetic eye exam?”    NO     No diabetic eye exam on file

## 2024-11-06 NOTE — PROGRESS NOTES
HISTORY OF PRESENT ILLNESS      Pauly Rogel is a 34 y.o. female.    BP (!) 139/94 (Site: Left Upper Arm, Position: Sitting, Cuff Size: Medium Adult)   Pulse 96   Temp 97.1 °F (36.2 °C) (Temporal)   Resp 18   Ht 1.549 m (5' 1\")   Wt 105.7 kg (233 lb)   LMP 10/31/2024 (Exact Date)   SpO2 97%   BMI 44.02 kg/m²     Here for routine diabetes f/u    But also Hospital f/u from the ER      Pt was in [facility] ER  on 10/7/24  with pneumonia recently. RLL    The did a chest CT to see this. She had some right flank pain which they thought was due to the pneumonia    She was sent home with doxycyline    She was feeling better until a week ago. Now she has some additional SOB. No fever. Some cough          Review of Systems   Constitutional:  Negative for chills and fever.   HENT:  Positive for congestion and ear pain.    Respiratory:  Positive for cough, chest tightness, shortness of breath and wheezing.    Musculoskeletal:  Positive for back pain (chronic).           Physical Exam  Vitals and nursing note reviewed.   Constitutional:       Appearance: Normal appearance.   HENT:      Head: Normocephalic and atraumatic.      Right Ear: Tympanic membrane is erythematous and bulging.      Mouth/Throat:      Mouth: Mucous membranes are moist.      Pharynx: Oropharynx is clear. No oropharyngeal exudate.   Cardiovascular:      Rate and Rhythm: Normal rate and regular rhythm.   Pulmonary:      Effort: Pulmonary effort is normal.      Comments: Slight musical rales  Musculoskeletal:      Cervical back: Tenderness (over eustachian region, right > left) present.   Skin:     General: Skin is warm and dry.   Neurological:      General: No focal deficit present.      Mental Status: She is alert and oriented to person, place, and time.   Psychiatric:         Mood and Affect: Mood normal.         Behavior: Behavior normal.         ASSESSMENT and PLAN      ICD-10-CM    1. Wheezing  R06.2 moxifloxacin (AVELOX) 400 MG tablet     AMB POC

## 2024-11-07 DIAGNOSIS — R80.9 PROTEINURIA, UNSPECIFIED TYPE: Primary | ICD-10-CM

## 2024-11-07 LAB
ALBUMIN/CREAT UR: 984 MG/G CREAT (ref 0–29)
BUN SERPL-MCNC: 7 MG/DL (ref 6–20)
BUN/CREAT SERPL: 9 (ref 9–23)
CALCIUM SERPL-MCNC: 8.9 MG/DL (ref 8.7–10.2)
CHLORIDE SERPL-SCNC: 103 MMOL/L (ref 96–106)
CHOLEST SERPL-MCNC: 182 MG/DL (ref 100–199)
CO2 SERPL-SCNC: 24 MMOL/L (ref 20–29)
CREAT SERPL-MCNC: 0.76 MG/DL (ref 0.57–1)
CREAT UR-MCNC: 120.4 MG/DL
EGFRCR SERPLBLD CKD-EPI 2021: 105 ML/MIN/1.73
GLUCOSE SERPL-MCNC: 100 MG/DL (ref 70–99)
HBA1C MFR BLD: 7.1 % (ref 4.8–5.6)
HDLC SERPL-MCNC: 36 MG/DL
LDLC SERPL CALC-MCNC: 123 MG/DL (ref 0–99)
MICROALBUMIN UR-MCNC: 1184.5 UG/ML
POTASSIUM SERPL-SCNC: 3.8 MMOL/L (ref 3.5–5.2)
SODIUM SERPL-SCNC: 139 MMOL/L (ref 134–144)
TRIGL SERPL-MCNC: 126 MG/DL (ref 0–149)
VLDLC SERPL CALC-MCNC: 23 MG/DL (ref 5–40)

## 2024-11-08 ENCOUNTER — TELEPHONE (OUTPATIENT)
Facility: CLINIC | Age: 34
End: 2024-11-08

## 2024-11-08 NOTE — PROGRESS NOTES
Orders Placed This Encounter    Protein, urine, timed     Standing Status:   Future     Standing Expiration Date:   11/7/2025       Encounter Diagnosis   Name Primary?    Proteinuria, unspecified type Yes

## 2024-11-21 LAB
PROT 24H UR-MRATE: 2094 MG/24 HR (ref 30–150)
PROT UR-MCNC: 161.1 MG/DL

## 2024-11-22 ENCOUNTER — CLINICAL DOCUMENTATION (OUTPATIENT)
Facility: CLINIC | Age: 34
End: 2024-11-22

## 2024-11-22 DIAGNOSIS — R80.9 PROTEINURIA, UNSPECIFIED TYPE: Primary | ICD-10-CM

## 2024-11-22 NOTE — PROGRESS NOTES
Pauly Rogel  1990  34 y.o.  [address]  [facility] [address]        Past Medical History:   Diagnosis Date    ASCUS (atypical squamous cells of undetermined significance) on Pap smear 3/11    Asthma     Bipolar disorder ([facility])     Cardiomyopathy ([facility]) 10/2020    EF 38% on echo. Global hypokinesis. Cardiac CT no significant obstructive disease    Chlamydia      \"    Chronic back pain     Congestive heart failure ([facility]) 09/2020    COVID-19 02/09/2021    COVID-19 01/05/2024    CTS (carpal tunnel syndrome)     Bilateral    Depression     Diabetes mellitus ([facility]) 01/02/2017    Elevated blood sugar     GC (gonococcus infection) 2007/ 2004    Heart failure ([facility])     HPV (human papilloma virus) infection 3/11    Irregular menses     Schizophrenia ([facility])     Sleep apnea 2/14    Smoker     Suicide attempt ([facility]) 5/08    with tylenol    Suicide attempt ([facility]) 10/09    Trichomonal vaginitis 8/10    Uterine fibroid        No past surgical history on file.    Current Outpatient Medications   Medication Sig    fluticasone (FLONASE) 50 MCG/ACT nasal spray 2 sprays by Each Nostril route daily    predniSONE (DELTASONE) 10 MG tablet Take 2 tablets by mouth for 3 days, then one for three days    HYDROcodone-acetaminophen (NORCO) 5-325 MG per tablet Take 1 tablet by mouth every 6 hours as needed for Pain for up to 30 days. Intended supply: 30 days Max Daily Amount: 4 tablets    fluconazole (DIFLUCAN) 200 MG tablet Take 1 tablet by mouth daily    Semaglutide,0.25 or 0.5MG/DOS, (OZEMPIC, 0.25 OR 0.5 MG/DOSE,) 2 MG/3ML SOPN Inject 0.25 mg weekly for 4 weeks, then increase to 0.5 mg weekly. Then as directed.    pregabalin (LYRICA) 75 MG capsule Take 1 capsule by mouth 2 times daily for 180 days. Max Daily Amount: 150 mg    buPROPion (WELLBUTRIN XL) 150 MG extended release tablet Take 1 tablet by mouth every morning    famotidine (PEPCID) 20 MG tablet take 1 tablet by mouth once daily    metFORMIN (GLUCOPHAGE) 1000 MG tablet take 1

## 2024-11-22 NOTE — PROGRESS NOTES
Orders Placed This Encounter    External Referral To Nephrology     Referral Priority:   Routine     Referral Type:   Eval and Treat     Referral Reason:   Specialty Services Required     Requested Specialty:   Nephrology     Number of Visits Requested:   1     Encounter Diagnosis   Name Primary?    Proteinuria, unspecified type Yes

## 2024-12-03 DIAGNOSIS — E11.9 CONTROLLED TYPE 2 DIABETES MELLITUS WITHOUT COMPLICATION, WITHOUT LONG-TERM CURRENT USE OF INSULIN (HCC): ICD-10-CM

## 2024-12-03 DIAGNOSIS — Z79.891 ENCOUNTER FOR LONG-TERM USE OF OPIATE ANALGESIC: ICD-10-CM

## 2024-12-03 DIAGNOSIS — Z76.0 MEDICATION REFILL: ICD-10-CM

## 2024-12-03 DIAGNOSIS — G89.29 CHRONIC BILATERAL LOW BACK PAIN, UNSPECIFIED WHETHER SCIATICA PRESENT: ICD-10-CM

## 2024-12-03 DIAGNOSIS — E66.01 OBESITY, CLASS III, BMI 40-49.9 (MORBID OBESITY): ICD-10-CM

## 2024-12-03 DIAGNOSIS — M54.50 CHRONIC BILATERAL LOW BACK PAIN, UNSPECIFIED WHETHER SCIATICA PRESENT: ICD-10-CM

## 2024-12-03 RX ORDER — HYDROCODONE BITARTRATE AND ACETAMINOPHEN 5; 325 MG/1; MG/1
1 TABLET ORAL EVERY 6 HOURS PRN
Qty: 90 TABLET | Refills: 0 | Status: SHIPPED | OUTPATIENT
Start: 2024-12-03 | End: 2025-01-02

## 2024-12-03 RX ORDER — SEMAGLUTIDE 0.68 MG/ML
INJECTION, SOLUTION SUBCUTANEOUS
Qty: 3 ML | Refills: 1 | Status: SHIPPED | OUTPATIENT
Start: 2024-12-03

## 2024-12-31 DIAGNOSIS — G89.29 CHRONIC BILATERAL LOW BACK PAIN, UNSPECIFIED WHETHER SCIATICA PRESENT: ICD-10-CM

## 2024-12-31 DIAGNOSIS — E11.9 CONTROLLED TYPE 2 DIABETES MELLITUS WITHOUT COMPLICATION, WITHOUT LONG-TERM CURRENT USE OF INSULIN (HCC): ICD-10-CM

## 2024-12-31 DIAGNOSIS — Z76.0 MEDICATION REFILL: ICD-10-CM

## 2024-12-31 DIAGNOSIS — E66.01 OBESITY, CLASS III, BMI 40-49.9 (MORBID OBESITY): ICD-10-CM

## 2024-12-31 DIAGNOSIS — Z79.891 ENCOUNTER FOR LONG-TERM USE OF OPIATE ANALGESIC: ICD-10-CM

## 2024-12-31 DIAGNOSIS — M54.50 CHRONIC BILATERAL LOW BACK PAIN, UNSPECIFIED WHETHER SCIATICA PRESENT: ICD-10-CM

## 2024-12-31 RX ORDER — HYDROCODONE BITARTRATE AND ACETAMINOPHEN 5; 325 MG/1; MG/1
1 TABLET ORAL EVERY 6 HOURS PRN
Qty: 90 TABLET | Refills: 0 | Status: SHIPPED | OUTPATIENT
Start: 2024-12-31 | End: 2025-01-30

## 2024-12-31 RX ORDER — BETAMETHASONE VALERATE 1.2 MG/G
CREAM TOPICAL 2 TIMES DAILY
Qty: 1 EACH | Refills: 2 | Status: SHIPPED | OUTPATIENT
Start: 2024-12-31

## 2024-12-31 RX ORDER — SEMAGLUTIDE 0.68 MG/ML
0.5 INJECTION, SOLUTION SUBCUTANEOUS WEEKLY
Qty: 3 ML | Refills: 1 | Status: SHIPPED | OUTPATIENT
Start: 2024-12-31

## 2025-01-07 RX ORDER — ATORVASTATIN CALCIUM 40 MG/1
40 TABLET, FILM COATED ORAL DAILY
Qty: 90 TABLET | Refills: 1 | Status: SHIPPED | OUTPATIENT
Start: 2025-01-07

## 2025-01-13 RX ORDER — CARVEDILOL 6.25 MG/1
6.25 TABLET ORAL 2 TIMES DAILY WITH MEALS
Qty: 180 TABLET | Refills: 3 | Status: SHIPPED | OUTPATIENT
Start: 2025-01-13

## 2025-01-13 RX ORDER — SACUBITRIL AND VALSARTAN 49; 51 MG/1; MG/1
1 TABLET, FILM COATED ORAL 2 TIMES DAILY
Qty: 180 TABLET | Refills: 3 | Status: SHIPPED | OUTPATIENT
Start: 2025-01-13

## 2025-01-30 DIAGNOSIS — G89.29 CHRONIC BILATERAL LOW BACK PAIN, UNSPECIFIED WHETHER SCIATICA PRESENT: ICD-10-CM

## 2025-01-30 DIAGNOSIS — Z79.891 ENCOUNTER FOR LONG-TERM USE OF OPIATE ANALGESIC: ICD-10-CM

## 2025-01-30 DIAGNOSIS — Z76.0 MEDICATION REFILL: ICD-10-CM

## 2025-01-30 DIAGNOSIS — M54.50 CHRONIC BILATERAL LOW BACK PAIN, UNSPECIFIED WHETHER SCIATICA PRESENT: ICD-10-CM

## 2025-01-31 RX ORDER — HYDROCODONE BITARTRATE AND ACETAMINOPHEN 5; 325 MG/1; MG/1
1 TABLET ORAL EVERY 6 HOURS PRN
Qty: 90 TABLET | Refills: 0 | Status: SHIPPED | OUTPATIENT
Start: 2025-01-31 | End: 2025-03-02

## 2025-02-06 DIAGNOSIS — J45.20 MILD INTERMITTENT ASTHMA WITHOUT COMPLICATION: ICD-10-CM

## 2025-02-07 RX ORDER — PREDNISONE 10 MG/1
TABLET ORAL
Qty: 30 TABLET | Refills: 1 | Status: SHIPPED | OUTPATIENT
Start: 2025-02-07

## 2025-02-07 RX ORDER — ALBUTEROL SULFATE 0.83 MG/ML
2.5 SOLUTION RESPIRATORY (INHALATION) 3 TIMES DAILY PRN
Qty: 120 EACH | Refills: 5 | Status: SHIPPED | OUTPATIENT
Start: 2025-02-07

## 2025-02-07 NOTE — TELEPHONE ENCOUNTER
Ms. Rogel is requesting refills of:    Requested Prescriptions     Pending Prescriptions Disp Refills    predniSONE (DELTASONE) 10 MG tablet 9 tablet 2     Sig: Take 2 tablets by mouth for 3 days, then one for three days         to be sent to     [id number] - [place], [place] - [address] [facility] [phone] - F [phone]  [address] [facility] [address]  Phone: [phone] Fax: [phone]  .     LAST OFFICE VISIT:  11/6/2024     UPCOMING APPOINTMENT(S):  Future Appointments   Date Time Provider Department Center   2/14/2025  8:40 AM Kamran Porter MD [facility]       Patient offered an appointment? yes - via MyChart  How much medication does the patient have on hand? UNKNOWN    Provided notified

## 2025-02-07 NOTE — TELEPHONE ENCOUNTER
Ms. Rogel is requesting refills of:    Requested Prescriptions     Pending Prescriptions Disp Refills    albuterol (PROVENTIL) (2.5 MG/3ML) 0.083% nebulizer solution 120 each 5     Sig: Take 3 mLs by nebulization 3 times daily as needed for Shortness of Breath         to be [facility] to   [id number] - [place], [place] - [address] -  [phone] - F [phone]  [address] [address]  Phone: [phone] Fax: [phone]    [id number] - [place], [place] - [address] [facility] [phone] - F [phone]  [address] [place] [address]  Phone: [phone] Fax: [phone]    [id number] [place], [place] - [address] [facility] [phone] - F [phone]  [address] [facility] [address]  Phone: [phone] Fax: [phone]  .     LAST OFFICE VISIT:  11/6/2024     UPCOMING APPOINTMENT(S):  Future Appointments   Date Time Provider Department Center   2/14/2025  8:40 AM Kamran Porter MD [facility]       Patient offered an appointment? yes - via MyChart  How much medication does the patient have on hand? UNKNOWN    Provided notified

## 2025-02-14 ENCOUNTER — OFFICE VISIT (OUTPATIENT)
Age: 35
End: 2025-02-14

## 2025-02-14 VITALS
SYSTOLIC BLOOD PRESSURE: 172 MMHG | WEIGHT: 231 LBS | DIASTOLIC BLOOD PRESSURE: 122 MMHG | OXYGEN SATURATION: 97 % | HEART RATE: 100 BPM | BODY MASS INDEX: 43.61 KG/M2 | HEIGHT: 61 IN

## 2025-02-14 DIAGNOSIS — I42.0 DILATED CARDIOMYOPATHY (HCC): Primary | ICD-10-CM

## 2025-02-14 DIAGNOSIS — R06.02 SOB (SHORTNESS OF BREATH): ICD-10-CM

## 2025-02-14 DIAGNOSIS — I10 PRIMARY HYPERTENSION: Primary | ICD-10-CM

## 2025-02-14 RX ORDER — CARVEDILOL 12.5 MG/1
12.5 TABLET ORAL 2 TIMES DAILY WITH MEALS
Qty: 60 TABLET | Refills: 2 | Status: SHIPPED | OUTPATIENT
Start: 2025-02-14

## 2025-02-14 RX ORDER — CARVEDILOL 12.5 MG/1
12.5 TABLET ORAL 2 TIMES DAILY WITH MEALS
COMMUNITY
End: 2025-02-14 | Stop reason: SDUPTHER

## 2025-02-14 ASSESSMENT — PATIENT HEALTH QUESTIONNAIRE - PHQ9
SUM OF ALL RESPONSES TO PHQ QUESTIONS 1-9: 0
4. FEELING TIRED OR HAVING LITTLE ENERGY: NOT AT ALL
2. FEELING DOWN, DEPRESSED OR HOPELESS: NOT AT ALL
SUM OF ALL RESPONSES TO PHQ QUESTIONS 1-9: 0
6. FEELING BAD ABOUT YOURSELF - OR THAT YOU ARE A FAILURE OR HAVE LET YOURSELF OR YOUR FAMILY DOWN: NOT AT ALL
5. POOR APPETITE OR OVEREATING: NOT AT ALL
3. TROUBLE FALLING OR STAYING ASLEEP: NOT AT ALL
1. LITTLE INTEREST OR PLEASURE IN DOING THINGS: NOT AT ALL
SUM OF ALL RESPONSES TO PHQ QUESTIONS 1-9: 0
7. TROUBLE CONCENTRATING ON THINGS, SUCH AS READING THE NEWSPAPER OR WATCHING TELEVISION: NOT AT ALL
SUM OF ALL RESPONSES TO PHQ9 QUESTIONS 1 & 2: 0
8. MOVING OR SPEAKING SO SLOWLY THAT OTHER PEOPLE COULD HAVE NOTICED. OR THE OPPOSITE, BEING SO FIGETY OR RESTLESS THAT YOU HAVE BEEN MOVING AROUND A LOT MORE THAN USUAL: NOT AT ALL
SUM OF ALL RESPONSES TO PHQ QUESTIONS 1-9: 0
10. IF YOU CHECKED OFF ANY PROBLEMS, HOW DIFFICULT HAVE THESE PROBLEMS MADE IT FOR YOU TO DO YOUR WORK, TAKE CARE OF THINGS AT HOME, OR GET ALONG WITH OTHER PEOPLE: NOT DIFFICULT AT ALL
9. THOUGHTS THAT YOU WOULD BE BETTER OFF DEAD, OR OF HURTING YOURSELF: NOT AT ALL

## 2025-02-14 NOTE — PROGRESS NOTES
Identified pt with two pt identifiers(name and ). Reviewed record in preparation for visit and have obtained necessary documentation.    Pauly Rogel presents today for   Chief Complaint   Patient presents with    Follow-up     6m       Pt denies DIZZINESS, SOB, CHEST PAIN/ PRESSURE, FATIGUE/WEAKNESS, HEADACHES, SWELLING.             Pauly Rogel preferred language for health care discussion is english/other.    Personal Protective Equipment:   Personal Protective Equipment was used including: mask-surgical and hands-gloves. Patient was placed on no precaution(s). Patient was not masked.    Precautions:   Patient currently on None  Patient currently roomed with door closed.    Is someone accompanying this pt? no    Is the patient using any DME equipment during OV? no    Depression Screenin/14/2025     8:46 AM 2024    10:12 AM 2024     1:51 PM 2024     1:41 PM 2024     2:24 PM 2023     8:59 AM 2023     9:30 AM   PHQ-9 Questionaire   Little interest or pleasure in doing things 0 0 0 0 0 0 2   Feeling down, depressed, or hopeless 0 0 0 0 0 0 1   Trouble falling or staying asleep, or sleeping too much 0 0 0 0 3 0 3   Feeling tired or having little energy 0 0 0 0 0 0 3   Poor appetite or overeating 0 0 0 0 0 0 2   Feeling bad about yourself - or that you are a failure or have let yourself or your family down 0 0 0 0 0 0 1   Trouble concentrating on things, such as reading the newspaper or watching television 0 0 0 0 0 0 2   Moving or speaking so slowly that other people could have noticed. Or the opposite - being so fidgety or restless that you have been moving around a lot more than usual 0 0 0 0 0 0 0   Thoughts that you would be better off dead, or of hurting yourself in some way 0 0 0 0 0 0 0   PHQ-9 Total Score 0 0 0 0 3 0 14   If you checked off any problems, how difficult have these problems made it for you to do your work, take care of things at home, or get along

## 2025-02-14 NOTE — PATIENT INSTRUCTIONS
New Medication/Medication Changes/Medication Refill      Increase carvedilol 12.5 mg twice a day.    **please allow 24-48 hrs for medication to be escribed to pharmacy** If you need any refills on medications please contact your pharmacy so that the request can be escribed to the provider for review seven to 10 days prior to being out of medication.

## 2025-02-14 NOTE — TELEPHONE ENCOUNTER
PCP: Margy Ruth MD    Last appt:  2/14/2025   Future Appointments   Date Time Provider Department Center   3/12/2025  3:30 PM Margy Ruth MD [facility]   4/14/2025  8:40 AM Kamran Porter MD [facility]       Requested Prescriptions     Pending Prescriptions Disp Refills    carvedilol (COREG) 12.5 MG tablet 60 tablet 2     Sig: Take 1 tablet by mouth 2 times daily (with meals)       Request for a 30 or 90 day supply? Provider Discretion    Pharmacy: Patient told Dr. German malcolm wanted it sent to [facility].     Other Comments:   
Patient

## 2025-02-22 NOTE — PROGRESS NOTES
Subjective:      Pauly is in the office today for cardiac reevaluation. She is a 34-year-old diabetic woman with history of systolic heart failure.  An echocardiogram was done in October 2020 which demonstrated an ejection fraction of 38%. She also had grade 1 diastolic dysfunction. She was hospitalized at [facility] for acute CHF in the past. She was seen in the [facility] ED on 2/9/2021. At that time she presented with chest pain and a cough. She was evaluated and discharged on antibiotics and steroids and was felt to have bronchitis.  She was also seen in the ED at [facility] in 2021 for evaluation of chest pain.  She was ruled out and discharged for follow-up.    The patient had a previous cardiac CTA which did not demonstrate any significant obstructive coronary disease.    In the office today, she reports that she had a broken tooth.  She went to the ED for evaluation.  In the office today \"she reports she feels pretty good.  Her blood pressure was quite elevated although on repeat check, it had dropped significantly.    Patient Active Problem List    Diagnosis Date Noted    Dilated cardiomyopathy (HCC) 03/24/2021    Tobacco abuse 03/24/2021    Acute systolic heart failure (HCC) 10/16/2020    Shortness of breath 08/29/2020    Type 2 diabetes with nephropathy (HCC) 12/17/2019    Asthma     Obesity, morbid ([facility]) 11/27/2017    Controlled type 2 diabetes mellitus without complication, without long-term current use of insulin (HCC) 06/29/2017    Nasal congestion 07/16/2015    Chronic back pain 07/16/2015    Anxiety 07/16/2015     Current Outpatient Medications   Medication Sig Dispense Refill      120 Tablet 0    methocarbamoL (ROBAXIN) 750 mg tablet Take 1 Tablet by mouth four (4) times daily. 20 Tablet 0    miconazole nitrate (Monistat 3) 200 mg- 2 % (9 gram) kit Insert  into vagina nightly.      miconazole nitrate (Monistat 7) 2 % (100 mg)- 2 % (9 gram) cpfc Insert  into vagina.      Vitamin D2

## 2025-02-26 DIAGNOSIS — Z76.0 MEDICATION REFILL: ICD-10-CM

## 2025-02-26 DIAGNOSIS — M54.50 CHRONIC BILATERAL LOW BACK PAIN, UNSPECIFIED WHETHER SCIATICA PRESENT: ICD-10-CM

## 2025-02-26 DIAGNOSIS — G89.29 CHRONIC BILATERAL LOW BACK PAIN, UNSPECIFIED WHETHER SCIATICA PRESENT: ICD-10-CM

## 2025-02-26 DIAGNOSIS — Z79.891 ENCOUNTER FOR LONG-TERM USE OF OPIATE ANALGESIC: ICD-10-CM

## 2025-02-26 RX ORDER — HYDROCODONE BITARTRATE AND ACETAMINOPHEN 5; 325 MG/1; MG/1
1 TABLET ORAL EVERY 6 HOURS PRN
Qty: 90 TABLET | Refills: 0 | Status: SHIPPED | OUTPATIENT
Start: 2025-02-28 | End: 2025-03-30

## 2025-03-05 ENCOUNTER — PATIENT MESSAGE (OUTPATIENT)
Age: 35
End: 2025-03-05

## 2025-03-07 ENCOUNTER — OFFICE VISIT (OUTPATIENT)
Age: 35
End: 2025-03-07

## 2025-03-07 VITALS
SYSTOLIC BLOOD PRESSURE: 109 MMHG | WEIGHT: 236 LBS | DIASTOLIC BLOOD PRESSURE: 78 MMHG | BODY MASS INDEX: 44.56 KG/M2 | OXYGEN SATURATION: 98 % | HEIGHT: 61 IN | HEART RATE: 85 BPM

## 2025-03-07 DIAGNOSIS — E66.01 OBESITY, MORBID: Primary | ICD-10-CM

## 2025-03-07 RX ORDER — IBUPROFEN 600 MG/1
TABLET, FILM COATED ORAL
COMMUNITY
Start: 2025-02-12

## 2025-03-07 NOTE — TELEPHONE ENCOUNTER
PCP: Margy Ruth MD    Last appt:  3/7/2025   Future Appointments   Date Time Provider Department Center   3/12/2025  3:30 PM Margy Ruth MD [facility]   4/14/2025  8:40 AM [facility]       Requested Prescriptions     Pending Prescriptions Disp Refills    carvedilol (COREG) 25 MG tablet 60 tablet 5     Sig: Take 1 tablet by mouth 2 times daily    hydroCHLOROthiazide 12.5 MG tablet 90 tablet 3     Sig: Take 1 tablet by mouth every morning       Request for a 30 or 90 day supply? Provider Discretion    Pharmacy: confirmed     Other Comments:n/a

## 2025-03-07 NOTE — PATIENT INSTRUCTIONS
Testing   Echo/ Nuclear Stress/ Treadmill Stress/ 24/48 HR Holter    Please call [facility] central scheduling at [phone]/ eric central scheduling at [phone] to schedule testing.     Echo  PATIENT PREPS:   -Wear easy to remove shirt to your appointment for easier accessibility.    Nuclear Stress Instructions-  PATIENT PREPS:   -NPO (Nothing to eat or drink) after midnight the night prior to the exam.    -No medications on the day of exam. You may bring them with you.   -Wear comfortable clothing and shoes suitable for walking on a treadmill. (NO sandals, flip flops, high heels, etc)   -The duration of this exam is approximately 2 to 4 hours.      **call office 3-5 days after testing is completed for results** Please ensure testing is completed prior to scheduled follow up appointment. If it is not completed your appointment may be rescheduled**    New Medication/Medication Changes/Medication Refill      **please allow 24-48 hrs for medication to be escribed to pharmacy** If you need any refills on medications please contact your pharmacy so that the request can be escribed to the provider for review seven to 10 days prior to being out of medication.        Other

## 2025-03-08 RX ORDER — HYDROCHLOROTHIAZIDE 12.5 MG/1
12.5 TABLET ORAL EVERY MORNING
Qty: 90 TABLET | Refills: 3 | Status: SHIPPED | OUTPATIENT
Start: 2025-03-08

## 2025-03-08 RX ORDER — CARVEDILOL 25 MG/1
25 TABLET ORAL 2 TIMES DAILY
Qty: 60 TABLET | Refills: 5 | Status: SHIPPED | OUTPATIENT
Start: 2025-03-08

## 2025-03-12 RX ORDER — SACUBITRIL AND VALSARTAN 49; 51 MG/1; MG/1
1 TABLET, FILM COATED ORAL 2 TIMES DAILY
Qty: 180 TABLET | Refills: 3 | Status: SHIPPED | OUTPATIENT
Start: 2025-03-12

## 2025-03-22 NOTE — PROGRESS NOTES
with other people? 0 0 0 0 0 0 1        Learning Assessment:  No question data found.    Abuse Screenin/14/2025     8:00 AM   AMB Abuse Screening   Do you ever feel afraid of your partner? N   Are you in a relationship with someone who physically or mentally threatens you? N   Is it safe for you to go home? Y          Fall Risk      2025     8:54 AM   Fall Risk   Do you feel unsteady or are you worried about falling?  no   2 or more falls in past year? no   Fall with injury in past year? no         Pt currently taking Anticoagulant /Antiplatelet therapy? aspirin    Coordination of Care:  1. Have you been to the ER, urgent care clinic since your last visit? Hospitalized since your last visit? no    2. Have you seen or consulted any other health care providers outside of the [facility] since your last visit? Include any pap smears or colon screening. no      Please see Red banners under Allergies and Med Rec to remove outside inquires. All correct information has been verified with patient and added to chart.     Medication's patient's would liked removed has been marked not taking to be removed per Verbal order and read back per Kamran Porter MD    
wall deformities or tenderness, respiratory effort normal   Lung: clear to auscultation bilaterally   Heart:  normal rate and regular rhythm, S1 and S2 normal, no murmurs noted, no gallops noted, no JVD   Abdomen: soft, non-tender.s Bowel sounds normal. No masses,  no organomegaly   Extremities: extremities normal, atraumatic, no cyanosis or edema Skin: no rashes   Neuro: alert, oriented, normal speech, no focal findings or movement disorder noted     EK2025.  Sinus rhythm.  Poor R wave progression, may be related to lead placement    Assessment/Plan:       ICD-10-CM ICD-9-CM    1. Type 2 diabetes with nephropathy (HCC), last hemoglobin A1c 7.1 down from 8.6 E11.21 250.40      583.81    2. Tobacco abuse  Z72.0 305.1    3. Dilated cardiomyopathy (HCC) , last echo 10/16/2020. EF 38%. Moderate global hypokinesis. Stage I diastolic dysfunction.  Continue Entresto  49/51 twice daily.  Continue carvedilol 6.25 twice daily.  Echocardiogram completed 2023 demonstrated ejection fraction of 35 to 40%.  There was grade 1 diastolic dysfunction.  There was moderately dilated RV.  There was mild to moderate MR. Nuclear stress test ordered at a previous appointment.  It was completed on 2023.  There was no evidence of prior scarring or ongoing ischemia.  EF was 49%.  Will increase carvedilol to 25 mg along will add HCTZ 12.5 daily.   I42.0 425.4    4. Anxiety  F41.9 300.00    5. VAMSI, diagnosed , prescribed CPAP in the past

## 2025-03-31 ENCOUNTER — TELEPHONE (OUTPATIENT)
Facility: CLINIC | Age: 35
End: 2025-03-31

## 2025-03-31 DIAGNOSIS — G89.29 CHRONIC BILATERAL LOW BACK PAIN, UNSPECIFIED WHETHER SCIATICA PRESENT: ICD-10-CM

## 2025-03-31 DIAGNOSIS — Z79.891 ENCOUNTER FOR LONG-TERM USE OF OPIATE ANALGESIC: ICD-10-CM

## 2025-03-31 DIAGNOSIS — M54.50 CHRONIC BILATERAL LOW BACK PAIN, UNSPECIFIED WHETHER SCIATICA PRESENT: ICD-10-CM

## 2025-03-31 DIAGNOSIS — Z76.0 MEDICATION REFILL: ICD-10-CM

## 2025-03-31 RX ORDER — CARVEDILOL 25 MG/1
25 TABLET ORAL 2 TIMES DAILY
Qty: 180 TABLET | Refills: 2 | Status: SHIPPED | OUTPATIENT
Start: 2025-03-31

## 2025-03-31 NOTE — TELEPHONE ENCOUNTER
PCP: Margy Ruth MD    Last appt:  3/7/2025   Future Appointments   Date Time Provider Department Center   4/14/2025  8:40 AM Kamran Porter MD [facility]   5/27/2025 10:30 AM Margy Ruth MD [facility] DEP       Requested Prescriptions     Pending Prescriptions Disp Refills    carvedilol (COREG) 25 MG tablet [Pharmacy Med Name: CARVEDILOL 25 MG TABLET] 200 tablet 5     Sig: take 1 tablet by mouth twice a day       Request for a 30 or 90 day supply? Provider Discretion    Pharmacy: Confirmed      Other Comments: Pt is requesting a new script for 90 day supply

## 2025-03-31 NOTE — TELEPHONE ENCOUNTER
Ms. Rogel is requesting refills of:    HYDROcodone-acetaminophen (NORCO) 5-325 MG per tablet         to be [facility] to     [id number] - [place], [place] - [address] [facility] [phone] - F [phone]  [address] [facility] [address]  Phone: [phone] Fax: [phone]  .     LAST OFFICE VISIT:  11/6/2024     UPCOMING APPOINTMENT(S):  Future Appointments   Date Time Provider Department Center   4/14/2025  8:40 AM Kamran Porter MD [facility]   5/27/2025 10:30 AM [facility]       Patient offered an appointment? N/A  How much medication does the patient have on hand? 0    Provided notified

## 2025-04-01 RX ORDER — HYDROCODONE BITARTRATE AND ACETAMINOPHEN 5; 325 MG/1; MG/1
1 TABLET ORAL EVERY 6 HOURS PRN
Qty: 90 TABLET | Refills: 0 | Status: SHIPPED | OUTPATIENT
Start: 2025-04-01 | End: 2025-05-01

## 2025-04-01 NOTE — TELEPHONE ENCOUNTER
Orders Placed This Encounter    HYDROcodone-acetaminophen (NORCO) 5-325 MG per tablet     Sig: Take 1 tablet by mouth every 6 hours as needed for Pain for up to 30 days. Intended supply: 30 days Max Daily Amount: 4 tablets     Dispense:  90 tablet     Refill:  0     Reduce doses taken as pain becomes manageable

## 2025-04-27 DIAGNOSIS — G89.29 CHRONIC BILATERAL LOW BACK PAIN, UNSPECIFIED WHETHER SCIATICA PRESENT: ICD-10-CM

## 2025-04-27 DIAGNOSIS — Z79.891 ENCOUNTER FOR LONG-TERM USE OF OPIATE ANALGESIC: ICD-10-CM

## 2025-04-27 DIAGNOSIS — E66.813 OBESITY, CLASS III, BMI 40-49.9 (MORBID OBESITY) (HCC): ICD-10-CM

## 2025-04-27 DIAGNOSIS — M54.50 CHRONIC BILATERAL LOW BACK PAIN, UNSPECIFIED WHETHER SCIATICA PRESENT: ICD-10-CM

## 2025-04-27 DIAGNOSIS — E11.9 CONTROLLED TYPE 2 DIABETES MELLITUS WITHOUT COMPLICATION, WITHOUT LONG-TERM CURRENT USE OF INSULIN (HCC): ICD-10-CM

## 2025-04-27 DIAGNOSIS — F41.9 ANXIETY AND DEPRESSION: ICD-10-CM

## 2025-04-27 DIAGNOSIS — F32.A ANXIETY AND DEPRESSION: ICD-10-CM

## 2025-04-27 DIAGNOSIS — Z76.0 MEDICATION REFILL: ICD-10-CM

## 2025-04-28 RX ORDER — FLUCONAZOLE 200 MG/1
200 TABLET ORAL DAILY
Qty: 3 TABLET | Refills: 1 | Status: SHIPPED | OUTPATIENT
Start: 2025-04-28

## 2025-04-28 RX ORDER — HYDROCODONE BITARTRATE AND ACETAMINOPHEN 5; 325 MG/1; MG/1
1 TABLET ORAL EVERY 6 HOURS PRN
Qty: 90 TABLET | Refills: 0 | Status: SHIPPED | OUTPATIENT
Start: 2025-04-28 | End: 2025-05-28

## 2025-04-28 RX ORDER — BUPROPION HYDROCHLORIDE 150 MG/1
150 TABLET ORAL EVERY MORNING
Qty: 90 TABLET | Refills: 1 | Status: SHIPPED | OUTPATIENT
Start: 2025-04-28

## 2025-04-28 RX ORDER — BETAMETHASONE VALERATE 1.2 MG/G
CREAM TOPICAL 2 TIMES DAILY
Qty: 1 EACH | Refills: 2 | Status: SHIPPED | OUTPATIENT
Start: 2025-04-28

## 2025-04-28 RX ORDER — SEMAGLUTIDE 0.68 MG/ML
0.5 INJECTION, SOLUTION SUBCUTANEOUS WEEKLY
Qty: 3 ML | Refills: 1 | Status: SHIPPED | OUTPATIENT
Start: 2025-04-28

## 2025-05-07 DIAGNOSIS — J45.20 MILD INTERMITTENT ASTHMA WITHOUT COMPLICATION: ICD-10-CM

## 2025-05-07 RX ORDER — PREDNISONE 10 MG/1
TABLET ORAL
Qty: 30 TABLET | Refills: 1 | Status: SHIPPED | OUTPATIENT
Start: 2025-05-07

## 2025-05-07 NOTE — TELEPHONE ENCOUNTER
Ms. Rogel is requesting refills of:    Requested Prescriptions     Pending Prescriptions Disp Refills    predniSONE (DELTASONE) 10 MG tablet 30 tablet 1     Sig: Take 4 tablets by mouth for 3 days, then 3 for 3 days, then 2 for 3 days, then 1 for three days         to be sent to     [facility] #[id number] [place], [place] - [address] [facility] [phone] - F [phone]  [address] [facility] [address]  Phone: [phone] Fax: [phone]  .     LAST OFFICE VISIT:  11/6/2024     UPCOMING APPOINTMENT(S):  Future Appointments   Date Time Provider Department Center   5/27/2025 10:30 AM Margy Ruth MD A [facility] DEP       Patient offered an appointment? N/a  How much medication does the patient have on hand? unknown    Provided notified

## 2025-05-27 DIAGNOSIS — E66.813 OBESITY, CLASS III, BMI 40-49.9 (MORBID OBESITY) (HCC): ICD-10-CM

## 2025-05-27 DIAGNOSIS — E11.9 CONTROLLED TYPE 2 DIABETES MELLITUS WITHOUT COMPLICATION, WITHOUT LONG-TERM CURRENT USE OF INSULIN (HCC): ICD-10-CM

## 2025-05-27 DIAGNOSIS — Z79.891 ENCOUNTER FOR LONG-TERM USE OF OPIATE ANALGESIC: ICD-10-CM

## 2025-05-27 DIAGNOSIS — G89.29 CHRONIC BILATERAL LOW BACK PAIN, UNSPECIFIED WHETHER SCIATICA PRESENT: ICD-10-CM

## 2025-05-27 DIAGNOSIS — Z76.0 MEDICATION REFILL: ICD-10-CM

## 2025-05-27 DIAGNOSIS — M54.50 CHRONIC BILATERAL LOW BACK PAIN, UNSPECIFIED WHETHER SCIATICA PRESENT: ICD-10-CM

## 2025-05-28 RX ORDER — HYDROCODONE BITARTRATE AND ACETAMINOPHEN 5; 325 MG/1; MG/1
1 TABLET ORAL EVERY 6 HOURS PRN
Qty: 90 TABLET | Refills: 0 | Status: SHIPPED | OUTPATIENT
Start: 2025-05-28 | End: 2025-06-27

## 2025-05-28 RX ORDER — SEMAGLUTIDE 0.68 MG/ML
0.5 INJECTION, SOLUTION SUBCUTANEOUS WEEKLY
Qty: 3 ML | Refills: 1 | Status: SHIPPED | OUTPATIENT
Start: 2025-05-28

## 2025-05-28 NOTE — TELEPHONE ENCOUNTER
Ms. Rogel is requesting refills of:    Requested Prescriptions     Pending Prescriptions Disp Refills    Semaglutide,0.25 or 0.5MG/DOS, (OZEMPIC, 0.25 OR 0.5 MG/DOSE,) 2 MG/3ML SOPN 3 mL 1     Sig: Inject 0.5 mg into the skin once a week Inject 0.25 mg weekly for 4 weeks, then increase to 0.5 mg weekly. Then as directed.         to be sent to   [facility] pharmacy [address] [place] [address]   LAST OFFICE VISIT:  11/6/2024     UPCOMING APPOINTMENT(S):  Future Appointments   Date Time Provider Department Center   7/31/2025  8:15 AM Margy Ruth MD [facility] DEP         Provided notified

## 2025-05-28 NOTE — TELEPHONE ENCOUNTER
Ms. Rogel is requesting refills of:    Requested Prescriptions     Pending Prescriptions Disp Refills    HYDROcodone-acetaminophen (NORCO) 5-325 MG per tablet 90 tablet 0     Sig: Take 1 tablet by mouth every 6 hours as needed for Pain for up to 30 days. Intended supply: 30 days Max Daily Amount: 4 tablets         to be [facility] to   [id number] - [place], [place] - [address] -  [phone] - F [phone]  [address] [address]  Phone: [phone] Fax: [phone]    [id number] - [place], [place] - [address] [facility] [phone] - F [phone]  [address] [place] [address]  Phone: [phone] Fax: [phone]    [id number] - [place], [place] - [address] [facility] [phone] - F [phone]  [address] [facility] [address]  Phone: [phone] Fax: [phone]  .     LAST OFFICE VISIT:  11/6/2024     UPCOMING APPOINTMENT(S):  Future Appointments   Date Time Provider Department Center   7/31/2025  8:15 AM Margy Ruth MD [facility] DEP         Provided notified

## 2025-06-26 DIAGNOSIS — Z79.891 ENCOUNTER FOR LONG-TERM USE OF OPIATE ANALGESIC: ICD-10-CM

## 2025-06-26 DIAGNOSIS — G89.29 CHRONIC BILATERAL LOW BACK PAIN, UNSPECIFIED WHETHER SCIATICA PRESENT: ICD-10-CM

## 2025-06-26 DIAGNOSIS — M54.50 CHRONIC BILATERAL LOW BACK PAIN, UNSPECIFIED WHETHER SCIATICA PRESENT: ICD-10-CM

## 2025-06-26 DIAGNOSIS — Z76.0 MEDICATION REFILL: ICD-10-CM

## 2025-06-27 RX ORDER — HYDROCODONE BITARTRATE AND ACETAMINOPHEN 5; 325 MG/1; MG/1
1 TABLET ORAL EVERY 6 HOURS PRN
Qty: 90 TABLET | Refills: 0 | Status: SHIPPED | OUTPATIENT
Start: 2025-06-27 | End: 2025-07-27

## 2025-07-20 DIAGNOSIS — E11.9 CONTROLLED TYPE 2 DIABETES MELLITUS WITHOUT COMPLICATION, WITHOUT LONG-TERM CURRENT USE OF INSULIN (HCC): ICD-10-CM

## 2025-07-20 DIAGNOSIS — E66.813 OBESITY, CLASS III, BMI 40-49.9 (MORBID OBESITY) (HCC): ICD-10-CM

## 2025-07-20 RX ORDER — SEMAGLUTIDE 0.68 MG/ML
0.5 INJECTION, SOLUTION SUBCUTANEOUS WEEKLY
Qty: 3 ML | Refills: 1 | Status: SHIPPED | OUTPATIENT
Start: 2025-07-20

## 2025-07-29 RX ORDER — HYDROCHLOROTHIAZIDE 12.5 MG/1
12.5 TABLET ORAL EVERY MORNING
Qty: 90 TABLET | Refills: 3 | Status: SHIPPED | OUTPATIENT
Start: 2025-07-29

## 2025-07-29 NOTE — TELEPHONE ENCOUNTER
PCP: Margy Ruth MD    Last appt:  3/7/2025   Future Appointments   Date Time Provider Department Center   7/31/2025  8:15 AM Margy Ruth MD [facility] DEP       Requested Prescriptions     Pending Prescriptions Disp Refills    hydroCHLOROthiazide 12.5 MG tablet 90 tablet 3     Sig: Take 1 tablet by mouth every morning       Request for a 30 or 90 day supply? Provider Discretion    Pharmacy: CONFIRMED    Other Comments: N/A

## 2025-07-30 DIAGNOSIS — Z79.891 ENCOUNTER FOR LONG-TERM USE OF OPIATE ANALGESIC: ICD-10-CM

## 2025-07-30 DIAGNOSIS — M54.50 CHRONIC BILATERAL LOW BACK PAIN, UNSPECIFIED WHETHER SCIATICA PRESENT: ICD-10-CM

## 2025-07-30 DIAGNOSIS — Z76.0 MEDICATION REFILL: ICD-10-CM

## 2025-07-30 DIAGNOSIS — G89.29 CHRONIC BILATERAL LOW BACK PAIN, UNSPECIFIED WHETHER SCIATICA PRESENT: ICD-10-CM

## 2025-07-30 RX ORDER — HYDROCODONE BITARTRATE AND ACETAMINOPHEN 5; 325 MG/1; MG/1
1 TABLET ORAL EVERY 6 HOURS PRN
Qty: 90 TABLET | Refills: 0 | Status: SHIPPED | OUTPATIENT
Start: 2025-07-30 | End: 2025-08-29

## 2025-07-30 NOTE — TELEPHONE ENCOUNTER
Ms. Rogel is requesting refills of:    Requested Prescriptions     Pending Prescriptions Disp Refills    HYDROcodone-acetaminophen (NORCO) 5-325 MG per tablet 90 tablet 0     Sig: Take 1 tablet by mouth every 6 hours as needed for Pain for up to 30 days. Intended supply: 30 days Max Daily Amount: 4 tablets         to be sent to   [address] -  [phone] - F [phone]  [address] [place] [address]  Phone: [phone] Fax: [phone]  .     LAST OFFICE VISIT:  11/6/2024     UPCOMING APPOINTMENT(S):  Future Appointments   Date Time Provider Department Center   7/31/2025  8:15 AM Margy Ruth MD [facility] DE[facility]         Provided notified

## 2025-07-31 ENCOUNTER — OFFICE VISIT (OUTPATIENT)
Facility: CLINIC | Age: 35
End: 2025-07-31
Payer: MEDICARE

## 2025-07-31 VITALS
HEIGHT: 61 IN | RESPIRATION RATE: 16 BRPM | HEART RATE: 76 BPM | BODY MASS INDEX: 46.44 KG/M2 | WEIGHT: 246 LBS | OXYGEN SATURATION: 100 % | SYSTOLIC BLOOD PRESSURE: 122 MMHG | DIASTOLIC BLOOD PRESSURE: 75 MMHG | TEMPERATURE: 97.5 F

## 2025-07-31 DIAGNOSIS — R09.81 NASAL CONGESTION: ICD-10-CM

## 2025-07-31 DIAGNOSIS — J45.40 MODERATE PERSISTENT ASTHMA WITHOUT COMPLICATION: ICD-10-CM

## 2025-07-31 DIAGNOSIS — E11.9 CONTROLLED TYPE 2 DIABETES MELLITUS WITHOUT COMPLICATION, WITHOUT LONG-TERM CURRENT USE OF INSULIN (HCC): ICD-10-CM

## 2025-07-31 DIAGNOSIS — E66.813 OBESITY, CLASS 3 (HCC): ICD-10-CM

## 2025-07-31 DIAGNOSIS — Z00.00 MEDICARE ANNUAL WELLNESS VISIT, SUBSEQUENT: Primary | ICD-10-CM

## 2025-07-31 DIAGNOSIS — F31.9 BIPOLAR AFFECTIVE DISORDER, REMISSION STATUS UNSPECIFIED (HCC): ICD-10-CM

## 2025-07-31 PROCEDURE — G0439 PPPS, SUBSEQ VISIT: HCPCS | Performed by: INTERNAL MEDICINE

## 2025-07-31 PROCEDURE — 99213 OFFICE O/P EST LOW 20 MIN: CPT | Performed by: INTERNAL MEDICINE

## 2025-07-31 PROCEDURE — G2211 COMPLEX E/M VISIT ADD ON: HCPCS | Performed by: INTERNAL MEDICINE

## 2025-07-31 RX ORDER — FLUTICASONE PROPIONATE AND SALMETEROL XINAFOATE 115; 21 UG/1; UG/1
2 AEROSOL, METERED RESPIRATORY (INHALATION) 2 TIMES DAILY
Qty: 12 G | Refills: 3 | Status: SHIPPED | OUTPATIENT
Start: 2025-07-31

## 2025-07-31 RX ORDER — MONTELUKAST SODIUM 10 MG/1
10 TABLET ORAL NIGHTLY
Qty: 90 TABLET | Refills: 1 | Status: SHIPPED | OUTPATIENT
Start: 2025-07-31

## 2025-07-31 SDOH — ECONOMIC STABILITY: FOOD INSECURITY: WITHIN THE PAST 12 MONTHS, THE FOOD YOU BOUGHT JUST DIDN'T LAST AND YOU DIDN'T HAVE MONEY TO GET MORE.: NEVER TRUE

## 2025-07-31 SDOH — ECONOMIC STABILITY: FOOD INSECURITY: WITHIN THE PAST 12 MONTHS, YOU WORRIED THAT YOUR FOOD WOULD RUN OUT BEFORE YOU GOT MONEY TO BUY MORE.: NEVER TRUE

## 2025-07-31 ASSESSMENT — PATIENT HEALTH QUESTIONNAIRE - PHQ9
SUM OF ALL RESPONSES TO PHQ QUESTIONS 1-9: 0
1. LITTLE INTEREST OR PLEASURE IN DOING THINGS: NOT AT ALL
SUM OF ALL RESPONSES TO PHQ QUESTIONS 1-9: 0
10. IF YOU CHECKED OFF ANY PROBLEMS, HOW DIFFICULT HAVE THESE PROBLEMS MADE IT FOR YOU TO DO YOUR WORK, TAKE CARE OF THINGS AT HOME, OR GET ALONG WITH OTHER PEOPLE: NOT DIFFICULT AT ALL
2. FEELING DOWN, DEPRESSED OR HOPELESS: NOT AT ALL
9. THOUGHTS THAT YOU WOULD BE BETTER OFF DEAD, OR OF HURTING YOURSELF: NOT AT ALL
3. TROUBLE FALLING OR STAYING ASLEEP: NOT AT ALL
SUM OF ALL RESPONSES TO PHQ QUESTIONS 1-9: 0
4. FEELING TIRED OR HAVING LITTLE ENERGY: NOT AT ALL
SUM OF ALL RESPONSES TO PHQ QUESTIONS 1-9: 0
6. FEELING BAD ABOUT YOURSELF - OR THAT YOU ARE A FAILURE OR HAVE LET YOURSELF OR YOUR FAMILY DOWN: NOT AT ALL
SUM OF ALL RESPONSES TO PHQ QUESTIONS 1-9: 0
SUM OF ALL RESPONSES TO PHQ QUESTIONS 1-9: 0
8. MOVING OR SPEAKING SO SLOWLY THAT OTHER PEOPLE COULD HAVE NOTICED. OR THE OPPOSITE, BEING SO FIGETY OR RESTLESS THAT YOU HAVE BEEN MOVING AROUND A LOT MORE THAN USUAL: NOT AT ALL
2. FEELING DOWN, DEPRESSED OR HOPELESS: NOT AT ALL
1. LITTLE INTEREST OR PLEASURE IN DOING THINGS: NOT AT ALL
SUM OF ALL RESPONSES TO PHQ QUESTIONS 1-9: 0
5. POOR APPETITE OR OVEREATING: NOT AT ALL
SUM OF ALL RESPONSES TO PHQ QUESTIONS 1-9: 0
7. TROUBLE CONCENTRATING ON THINGS, SUCH AS READING THE NEWSPAPER OR WATCHING TELEVISION: NOT AT ALL

## 2025-07-31 ASSESSMENT — ENCOUNTER SYMPTOMS
RHINORRHEA: 0
WHEEZING: 1
SHORTNESS OF BREATH: 1
TROUBLE SWALLOWING: 0
SINUS PRESSURE: 1

## 2025-07-31 ASSESSMENT — LIFESTYLE VARIABLES
HOW MANY STANDARD DRINKS CONTAINING ALCOHOL DO YOU HAVE ON A TYPICAL DAY: 1 OR 2
HOW OFTEN DO YOU HAVE A DRINK CONTAINING ALCOHOL: MONTHLY OR LESS

## 2025-07-31 NOTE — TELEPHONE ENCOUNTER
Orders Placed This Encounter    fluticasone-salmeterol (ADVAIR HFA) 115-21 MCG/ACT inhaler     Sig: Inhale 2 puffs into the lungs 2 times daily     Dispense:  12 g     Refill:  3     Per pharmacy request in place of [facility]

## 2025-07-31 NOTE — PROGRESS NOTES
Have you been to the ER, urgent care clinic since your last visit?  Hospitalized since your last visit?   NO    Have you seen or consulted any other health care providers outside our system since your last visit?   NO      “Have you had a diabetic eye exam?”    YES - Where: [facility]  Nurse/MARIANN to request most recent records if not in the chart I will verify the Eye Doctor and send letter     No diabetic eye exam on file          
has always been somewhat soft as it is for chronic back pain.    F/u 4 months for DM, asthma                      Medicare Annual Wellness Visit    Pauly Rogel is here for Hypertension, Diabetes, and Medicare AWV (/)    Assessment & Plan   Medicare annual wellness visit, subsequent         Return in about 4 months (around 11/30/2025) for Diabetes mellitus, asthma, nasal congestion, weight.     Subjective       Patient's complete Health Risk Assessment and screening values have been reviewed and are found in Flowsheets. The following problems were reviewed today and where indicated follow up appointments were made and/or referrals ordered.    Positive Risk Factor Screenings with Interventions:          Controlled Medication Review:    Today's Pain Level: No data recorded   Opioid Risk: (Low risk score <55) Opioid risk score: 12    Patient is low risk for opioid use disorder or overdose.    Last PDMP Carlos as Reviewed:  Review User Review Instant Review Result   ALFONSO MCMILLAN 7/31/2025  8:35 AM     Reviewed PDMP [1]         Poor Eating Habits/Diet:  Do you eat balanced/healthy meals regularly?: (!) No  Interventions:  See AVS for additional education material    Abnormal BMI (obese):  Body mass index is 46.48 kg/m². (!) Abnormal  Interventions:  See AVS for additional education material              Advanced Directives:  Do you have a Living Will?: (!) No    Intervention:  discussed                     Objective   Vitals:    07/31/25 0827   BP: 122/75   BP Site: Right Upper Arm   Patient Position: Sitting   BP Cuff Size: Medium Adult   Pulse: 76   Resp: 16   Temp: 97.5 °F (36.4 °C)   TempSrc: Temporal   SpO2: 100%   Weight: 111.6 kg (246 lb)   Height: 1.549 m (5' 1\")      Body mass index is 46.48 kg/m².                  Allergies   Allergen Reactions    Food Anaphylaxis     Orange    Other Anaphylaxis     [place], guacamole    Argania Spinosa Kernal Oil Hives    Naproxen Swelling    Oxycodone-Acetaminophen

## 2025-07-31 NOTE — PATIENT INSTRUCTIONS
late to quit. Try to avoid secondhand smoke too.     Stay at a weight that's healthy for you. Talk to your doctor if you need help losing weight.     Try to get 7 to 9 hours of sleep each night.     Limit alcohol to 2 drinks a day for men and 1 drink a day for women. Too much alcohol can cause health problems.     Manage other health problems such as diabetes, high blood pressure, and high cholesterol. If you think you may have a problem with alcohol or drug use, talk to your doctor.   Medicines    Take your medicines exactly as prescribed. Call your doctor if you think you are having a problem with your medicine.     If your doctor recommends aspirin, take the amount directed each day. Make sure you take aspirin and not another kind of pain reliever, such as acetaminophen (Tylenol).   When should you call for help?   Call 911 if you have symptoms of a heart attack. These may include:    Chest pain or pressure, or a strange feeling in the chest.     Sweating.     Shortness of breath.     Pain, pressure, or a strange feeling in the back, neck, jaw, or upper belly or in one or both shoulders or arms.     Lightheadedness or sudden weakness.     A fast or irregular heartbeat.   After you call 911, the  may tell you to chew 1 adult-strength or 2 to 4 low-dose aspirin. Wait for an ambulance. Do not try to drive yourself.  Watch closely for changes in your health, and be sure to contact your doctor if you have any problems.  Where can you learn more?  Go to https://www.[employer].net/patientEd and enter F075 to learn more about \"A Healthy Heart: Care Instructions.\"  Current as of: July 31, 2024  Content Version: 14.5  © 2085-6780 [employer].   Care instructions adapted under license by [employer]. If you have questions about a medical condition or this instruction, always ask your healthcare professional. [employer], [employer] disclaims any warranty or liability for your use of this

## 2025-08-01 LAB
ALBUMIN SERPL-MCNC: 3.6 G/DL (ref 3.9–4.9)
ALBUMIN/CREAT UR: 484 MG/G CREAT (ref 0–29)
ALP SERPL-CCNC: 114 IU/L (ref 44–121)
ALT SERPL-CCNC: 46 IU/L (ref 0–32)
AST SERPL-CCNC: 47 IU/L (ref 0–40)
BILIRUB SERPL-MCNC: 0.2 MG/DL (ref 0–1.2)
BUN SERPL-MCNC: 8 MG/DL (ref 6–20)
BUN/CREAT SERPL: 9 (ref 9–23)
CALCIUM SERPL-MCNC: 8.8 MG/DL (ref 8.7–10.2)
CHLORIDE SERPL-SCNC: 103 MMOL/L (ref 96–106)
CHOLEST SERPL-MCNC: 181 MG/DL (ref 100–199)
CO2 SERPL-SCNC: 19 MMOL/L (ref 20–29)
CREAT SERPL-MCNC: 0.92 MG/DL (ref 0.57–1)
CREAT UR-MCNC: 60.4 MG/DL
EGFRCR SERPLBLD CKD-EPI 2021: 83 ML/MIN/1.73
EST. AVERAGE GLUCOSE BLD GHB EST-MCNC: 151 MG/DL
GLOBULIN SER CALC-MCNC: 4.7 G/DL (ref 1.5–4.5)
GLUCOSE SERPL-MCNC: 110 MG/DL (ref 70–99)
HBA1C MFR BLD: 6.9 % (ref 4.8–5.6)
HDLC SERPL-MCNC: 36 MG/DL
LDLC SERPL CALC-MCNC: 115 MG/DL (ref 0–99)
MICROALBUMIN UR-MCNC: 292.5 UG/ML
POTASSIUM SERPL-SCNC: 3.8 MMOL/L (ref 3.5–5.2)
PROT SERPL-MCNC: 8.3 G/DL (ref 6–8.5)
SODIUM SERPL-SCNC: 138 MMOL/L (ref 134–144)
TRIGL SERPL-MCNC: 171 MG/DL (ref 0–149)
VLDLC SERPL CALC-MCNC: 30 MG/DL (ref 5–40)

## 2025-08-04 RX ORDER — ATORVASTATIN CALCIUM 40 MG/1
40 TABLET, FILM COATED ORAL DAILY
Qty: 90 TABLET | Refills: 3 | Status: SHIPPED | OUTPATIENT
Start: 2025-08-04

## 2025-08-26 DIAGNOSIS — G89.29 CHRONIC BILATERAL LOW BACK PAIN, UNSPECIFIED WHETHER SCIATICA PRESENT: ICD-10-CM

## 2025-08-26 DIAGNOSIS — Z79.891 ENCOUNTER FOR LONG-TERM USE OF OPIATE ANALGESIC: ICD-10-CM

## 2025-08-26 DIAGNOSIS — Z76.0 MEDICATION REFILL: ICD-10-CM

## 2025-08-26 DIAGNOSIS — M54.50 CHRONIC BILATERAL LOW BACK PAIN, UNSPECIFIED WHETHER SCIATICA PRESENT: ICD-10-CM

## 2025-08-26 RX ORDER — HYDROCODONE BITARTRATE AND ACETAMINOPHEN 5; 325 MG/1; MG/1
1 TABLET ORAL EVERY 6 HOURS PRN
Qty: 90 TABLET | Refills: 0 | Status: SHIPPED | OUTPATIENT
Start: 2025-08-26 | End: 2025-09-25